# Patient Record
Sex: FEMALE | Race: WHITE | NOT HISPANIC OR LATINO | Employment: OTHER | URBAN - METROPOLITAN AREA
[De-identification: names, ages, dates, MRNs, and addresses within clinical notes are randomized per-mention and may not be internally consistent; named-entity substitution may affect disease eponyms.]

---

## 2017-01-09 ENCOUNTER — HOSPITAL ENCOUNTER (OUTPATIENT)
Dept: MAMMOGRAPHY | Facility: HOSPITAL | Age: 82
Discharge: HOME | End: 2017-01-09
Attending: INTERNAL MEDICINE | Admitting: INTERNAL MEDICINE

## 2017-01-09 DIAGNOSIS — Z13.9 SCREENING: ICD-10-CM

## 2017-01-09 PROCEDURE — G0202 SCR MAMMO BI INCL CAD: HCPCS

## 2017-01-18 ENCOUNTER — OFFICE VISIT (OUTPATIENT)
Dept: INTERNAL MEDICINE | Facility: CLINIC | Age: 82
End: 2017-01-18

## 2017-01-18 VITALS
HEART RATE: 90 BPM | RESPIRATION RATE: 16 BRPM | TEMPERATURE: 98 F | SYSTOLIC BLOOD PRESSURE: 120 MMHG | HEIGHT: 61 IN | OXYGEN SATURATION: 95 % | BODY MASS INDEX: 29.94 KG/M2 | WEIGHT: 158.6 LBS | DIASTOLIC BLOOD PRESSURE: 72 MMHG

## 2017-01-18 DIAGNOSIS — J06.9 VIRAL UPPER RESPIRATORY TRACT INFECTION: Primary | ICD-10-CM

## 2017-01-18 PROCEDURE — 99213 OFFICE O/P EST LOW 20 MIN: CPT | Performed by: INTERNAL MEDICINE

## 2017-01-18 RX ORDER — AZITHROMYCIN 250 MG/1
TABLET, FILM COATED ORAL
Qty: 6 TABLET | Refills: 0 | Status: SHIPPED | OUTPATIENT
Start: 2017-01-18 | End: 2018-01-02

## 2017-01-18 RX ORDER — MAGNESIUM OXIDE/MAG AA CHELATE 300 MG
1 CAPSULE ORAL DAILY
COMMUNITY

## 2017-01-18 NOTE — PROGRESS NOTES
Subjective   Gladys Izquierdo is a 82 y.o. female.     Chief Complaint   Patient presents with   • Sore Throat     coughing   • Nasal Congestion         Sore Throat    This is a new problem. The current episode started yesterday. The problem has been unchanged. Neither side of throat is experiencing more pain than the other. There has been no fever. The pain is mild. Associated symptoms include congestion, coughing, headaches and shortness of breath. Pertinent negatives include no ear pain, hoarse voice or trouble swallowing. She has tried nothing for the symptoms. The treatment provided no relief.        The following portions of the patient's history were reviewed and updated as appropriate: allergies, current medications, past social history and problem list.    Outpatient Prescriptions Marked as Taking for the 1/18/17 encounter (Office Visit) with Johan Honeycutt MD   Medication Sig Dispense Refill   • losartan (COZAAR) 100 MG tablet TAKE 1 TABLET BY MOUTH DAILY 90 tablet 3   • Magnesium 300 MG capsule Take  by mouth.     • nystatin (MYCOSTATIN) 614053 UNIT/GM powder Apply  topically daily. 1 each 3   • PROAIR  (90 BASE) MCG/ACT inhaler INHALE 2 PUFFS BY MOUTH EVERY 4 HOURS AS NEEDED FOR WHEEZING ' 8.5 g 2   • Probiotic Product (PROBIOTIC ADVANCED PO) Take 1 tablet by mouth Daily.         Review of Systems   Constitutional: Negative for chills, fatigue and fever.   HENT: Positive for congestion, postnasal drip, rhinorrhea, sneezing, sore throat and voice change. Negative for ear pain, hoarse voice, sinus pressure and trouble swallowing.    Respiratory: Positive for cough and shortness of breath. Negative for wheezing.    Neurological: Positive for headaches.       Objective   Vitals:    01/18/17 1118   BP: 120/72   Pulse: 90   Resp: 16   Temp: 98 °F (36.7 °C)   SpO2: 95%      Last Weight    01/18/17  1118   Weight: 158 lb 9.6 oz (71.9 kg)    [unfilled]  Body mass index is 29.97 kg/(m^2).      Physical Exam    Constitutional: She appears well-developed and well-nourished.   HENT:   Head: Normocephalic and atraumatic.   Right Ear: External ear normal.   Left Ear: External ear normal.   Nose: Nose normal.   Mouth/Throat: Oropharynx is clear and moist.   Eyes: Conjunctivae are normal. Pupils are equal, round, and reactive to light.   Pulmonary/Chest: Effort normal and breath sounds normal. No respiratory distress. She has no wheezes. She has no rales.   Skin: Skin is warm and dry.         Problem List Items Addressed This Visit     None      Visit Diagnoses     Viral upper respiratory tract infection    -  Primary        Assessment/Plan   In with 1 day of sore throat.  Head congestion.  Postnasal drip.  Coughing.  She's leaving the country in 2 days for a trip to [place].  She'll start taking some Coricidin which she has on hand.  We'll start on a Z-Thiago to keep on hand for her trip.  Sounds like a viral illness.         Cindy disclaimer:   Much of this encounter note is an electronic transcription/translation of spoken language to printed text. The electronic translation of spoken language may permit erroneous, or at times, nonsensical words or phrases to be inadvertently transcribed; Although I have reviewed the note for such errors, some may still exist.

## 2017-01-18 NOTE — MR AVS SNAPSHOT
Gladys Izquierdo   2017 10:50 AM   Office Visit    Provider:  Johan Honeycutt MD   Department:  [facility] INTERNAL MEDICINE   Dept Phone:  [phone]                Your Full Care Plan              Where to Get Your Medications      These medications were sent to [facility] - [place], [place] - [address], [address] - [phone] PH - [phone] FX  [address], [address], [facility] [address]     Phone:  [phone]     azithromycin 250 MG tablet            Your Updated Medication List          This list is accurate as of: 17  1:36 PM.  Always use your most recent med list.                azithromycin 250 MG tablet   Commonly known as:  ZITHROMAX   Take 2 tablets the first day, then 1 tablet daily for 4 days.       losartan 100 MG tablet   Commonly known as:  COZAAR   TAKE 1 TABLET BY MOUTH DAILY       Magnesium 300 MG capsule       nystatin 253763 UNIT/GM powder   Commonly known as:  MYCOSTATIN   Apply  topically daily.       PROAIR  (90 BASE) MCG/ACT inhaler   Generic drug:  albuterol   INHALE 2 PUFFS BY MOUTH EVERY 4 HOURS AS NEEDED FOR WHEEZING '       PROBIOTIC ADVANCED PO               You Were Diagnosed With        Codes Comments    Viral upper respiratory tract infection    -  Primary ICD-10-CM: J06.9, B97.89  ICD-9-CM: 465.9       Instructions     None    Patient Instructions History      [employer] Signup     [facility] [employer] allows you to send messages to your doctor, view your test results, renew your prescriptions, schedule appointments, and more. To sign up, go to [employer] and click on the Sign Up Now link in the New User? box. Enter your [employer] Activation Code exactly as it appears below along with the last four digits of your Social Security Number and your Date of Birth () to complete the sign-up process. If you do not sign up before the expiration date, you must request a new code.    [employer]  "Activation Code: [email or web address]-  Expires: 2/1/2017  1:36 PM    If you have questions, you can email [email or web address] or call [phone] to talk to our MyChart staff. Remember, MyChart is NOT to be used for urgent needs. For medical emergencies, dial 911.               Other Info from Your Visit           Allergies     Demerol [Meperidine]      B/P problems    Latex      blisters    Morphine And Related      Decreased b/p    Dilaudid [Hydromorphone Hcl]  Rash      Reason for Visit     Sore Throat coughing    Nasal Congestion           Vital Signs     Blood Pressure Pulse Temperature Respirations Height Weight    120/72 (BP Location: Left arm, Patient Position: Sitting, Cuff Size: Large Adult) 90 98 °F (36.7 °C) (Oral) 16 61\" (154.9 cm) 158 lb 9.6 oz (71.9 kg)    Oxygen Saturation Body Mass Index Smoking Status             95% 29.97 kg/m2 Former Smoker         Problems and Diagnoses Noted     Viral upper respiratory tract infection    -  Primary      "

## 2017-10-23 ENCOUNTER — OFFICE VISIT (OUTPATIENT)
Dept: INTERNAL MEDICINE | Facility: CLINIC | Age: 82
End: 2017-10-23

## 2017-10-23 VITALS
OXYGEN SATURATION: 92 % | DIASTOLIC BLOOD PRESSURE: 70 MMHG | HEIGHT: 61 IN | TEMPERATURE: 98 F | WEIGHT: 144.6 LBS | BODY MASS INDEX: 27.3 KG/M2 | SYSTOLIC BLOOD PRESSURE: 144 MMHG | HEART RATE: 81 BPM | RESPIRATION RATE: 16 BRPM

## 2017-10-23 DIAGNOSIS — M51.35 DDD (DEGENERATIVE DISC DISEASE), THORACOLUMBAR: ICD-10-CM

## 2017-10-23 DIAGNOSIS — I10 ESSENTIAL HYPERTENSION: ICD-10-CM

## 2017-10-23 DIAGNOSIS — Z00.00 ENCOUNTER FOR ANNUAL HEALTH EXAMINATION: Primary | ICD-10-CM

## 2017-10-23 LAB
ALBUMIN SERPL-MCNC: 4.5 G/DL (ref 3.5–5.2)
ALBUMIN/GLOB SERPL: 1.8 G/DL
ALP SERPL-CCNC: 78 U/L (ref 39–117)
ALT SERPL-CCNC: 15 U/L (ref 1–33)
AST SERPL-CCNC: 21 U/L (ref 1–32)
BASOPHILS # BLD AUTO: 0.04 10*3/MM3 (ref 0–0.2)
BASOPHILS NFR BLD AUTO: 0.6 % (ref 0–1.5)
BILIRUB BLD-MCNC: ABNORMAL MG/DL
BILIRUB SERPL-MCNC: 0.6 MG/DL (ref 0.1–1.2)
BUN SERPL-MCNC: 25 MG/DL (ref 8–23)
BUN/CREAT SERPL: 26.6 (ref 7–25)
CALCIUM SERPL-MCNC: 10 MG/DL (ref 8.6–10.5)
CHLORIDE SERPL-SCNC: 104 MMOL/L (ref 98–107)
CHOLEST SERPL-MCNC: 192 MG/DL (ref 0–200)
CLARITY, POC: CLEAR
CO2 SERPL-SCNC: 25.6 MMOL/L (ref 22–29)
COLOR UR: YELLOW
CREAT SERPL-MCNC: 0.94 MG/DL (ref 0.57–1)
EOSINOPHIL # BLD AUTO: 0.21 10*3/MM3 (ref 0–0.7)
EOSINOPHIL NFR BLD AUTO: 3.3 % (ref 0.3–6.2)
ERYTHROCYTE [DISTWIDTH] IN BLOOD BY AUTOMATED COUNT: 13.6 % (ref 11.7–13)
GFR SERPLBLD CREATININE-BSD FMLA CKD-EPI: 57 ML/MIN/1.73
GFR SERPLBLD CREATININE-BSD FMLA CKD-EPI: 69 ML/MIN/1.73
GLOBULIN SER CALC-MCNC: 2.5 GM/DL
GLUCOSE SERPL-MCNC: 90 MG/DL (ref 65–99)
GLUCOSE UR STRIP-MCNC: NEGATIVE MG/DL
HCT VFR BLD AUTO: 38.4 % (ref 35.6–45.5)
HDLC SERPL-MCNC: 87 MG/DL (ref 40–60)
HGB BLD-MCNC: 12.2 G/DL (ref 11.9–15.5)
IMM GRANULOCYTES # BLD: 0 10*3/MM3 (ref 0–0.03)
IMM GRANULOCYTES NFR BLD: 0 % (ref 0–0.5)
KETONES UR QL: ABNORMAL
LDLC SERPL CALC-MCNC: 89 MG/DL (ref 0–100)
LEUKOCYTE EST, POC: NEGATIVE
LYMPHOCYTES # BLD AUTO: 1.9 10*3/MM3 (ref 0.9–4.8)
LYMPHOCYTES NFR BLD AUTO: 29.9 % (ref 19.6–45.3)
MCH RBC QN AUTO: 32.1 PG (ref 26.9–32)
MCHC RBC AUTO-ENTMCNC: 31.8 G/DL (ref 32.4–36.3)
MCV RBC AUTO: 101.1 FL (ref 80.5–98.2)
MONOCYTES # BLD AUTO: 0.66 10*3/MM3 (ref 0.2–1.2)
MONOCYTES NFR BLD AUTO: 10.4 % (ref 5–12)
NEUTROPHILS # BLD AUTO: 3.55 10*3/MM3 (ref 1.9–8.1)
NEUTROPHILS NFR BLD AUTO: 55.8 % (ref 42.7–76)
NITRITE UR-MCNC: NEGATIVE MG/ML
PH UR: 6 [PH] (ref 5–8)
PLATELET # BLD AUTO: 299 10*3/MM3 (ref 140–500)
POTASSIUM SERPL-SCNC: 4.6 MMOL/L (ref 3.5–5.2)
PROT SERPL-MCNC: 7 G/DL (ref 6–8.5)
PROT UR STRIP-MCNC: ABNORMAL MG/DL
RBC # BLD AUTO: 3.8 10*6/MM3 (ref 3.9–5.2)
RBC # UR STRIP: ABNORMAL /UL
SODIUM SERPL-SCNC: 144 MMOL/L (ref 136–145)
SP GR UR: 1.03 (ref 1–1.03)
TRIGL SERPL-MCNC: 81 MG/DL (ref 0–150)
UROBILINOGEN UR QL: NORMAL
VLDLC SERPL CALC-MCNC: 16.2 MG/DL (ref 5–40)
WBC # BLD AUTO: 6.36 10*3/MM3 (ref 4.5–10.7)

## 2017-10-23 PROCEDURE — 81003 URINALYSIS AUTO W/O SCOPE: CPT | Performed by: INTERNAL MEDICINE

## 2017-10-23 PROCEDURE — 99397 PER PM REEVAL EST PAT 65+ YR: CPT | Performed by: INTERNAL MEDICINE

## 2017-10-23 NOTE — PROGRESS NOTES
Subjective   Gladys Izquierdo is a 82 y.o. female.     Chief Complaint   Patient presents with   • Annual Exam         HPI Comments: In for annual preventative exam.  Sleep is good.  Gets 7-8 hours at night.  Exercises daily chasing her 3 grandchildren.  Nothing at the gym.  Energy is good.  Diet is well-balanced.       The following portions of the patient's history were reviewed and updated as appropriate: allergies, current medications, past social history and problem list.    HISTORY  No outpatient prescriptions have been marked as taking for the 10/23/17 encounter (Office Visit) with Johan Honeycutt MD.     Social History     Social History   • Marital status:      Spouse name: N/A   • Number of children: N/A   • Years of education: N/A     Occupational History   • Not on file.     Social History Main Topics   • Smoking status: Former Smoker     Years: 28.00     Quit date: 1995   • Smokeless tobacco: Not on file      Comment: less than 1 pack per day for 28 years    • Alcohol use No   • Drug use: Not on file   • Sexual activity: Not on file     Other Topics Concern   • Not on file     Social History Narrative     Family History   Problem Relation Age of Onset   • No Known Problems Brother    • No Known Problems Daughter    • No Known Problems Son    • No Known Problems Daughter      No past medical history on file.  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   HENT: Negative for congestion, ear pain, hearing loss, nosebleeds, postnasal drip, rhinorrhea, sinus pressure and trouble swallowing.    Eyes: Negative for pain, itching and visual disturbance.   Respiratory:  Positive for wheezing. Negative for cough, chest tightness and shortness of breath.    Cardiovascular: Negative for chest pain, palpitations and leg swelling.   Gastrointestinal: Negative for abdominal pain, anal bleeding, constipation, diarrhea, nausea, rectal pain and vomiting.   Endocrine: Negative for cold intolerance, heat intolerance and polyuria.   Genitourinary: Negative for difficulty urinating, dysuria, flank pain, frequency, hematuria and urgency.   Musculoskeletal: Positive for arthralgias (hands) and back pain. Negative for myalgias.   Skin: Negative for rash.   Allergic/Immunologic: Negative for environmental allergies.   Neurological: Negative for dizziness, syncope, speech difficulty, weakness, light-headedness, numbness and headaches.   Hematological: Bruises/bleeds easily.   Psychiatric/Behavioral: Negative for agitation, confusion and sleep disturbance. The patient is not nervous/anxious.        Objective   Vitals:    10/23/17 1422   BP: 144/70   Pulse: 81   Resp: 16   Temp: 98 °F (36.7 °C)   SpO2: 92%      Last Weight    10/23/17  1422   Weight: 144 lb 9.6 oz (65.6 kg)    [unfilled]  Body mass index is 27.32 kg/(m^2).      Physical Exam   Constitutional: She is oriented to person, place, and time. She appears well-developed and well-nourished.   HENT:   Head: Normocephalic and atraumatic.   Right Ear: External ear normal.   Left Ear: External ear normal.   Nose: Nose normal.   Mouth/Throat: Oropharynx is clear and moist.   Eyes: Conjunctivae and EOM are normal. Pupils are equal, round, and reactive to light.   Neck: Normal range of motion. Neck supple. No JVD present. No thyromegaly present.   Cardiovascular: Normal rate, regular rhythm, normal heart sounds and intact distal pulses.  Exam reveals no gallop.    No murmur heard.  Pulmonary/Chest: Effort normal and breath sounds normal. No respiratory distress. She has no wheezes. She has no rales. Right breast exhibits no inverted nipple, no  mass and no tenderness. Left breast exhibits no inverted nipple, no mass and no tenderness.   Abdominal: Soft. Bowel sounds are normal. She exhibits no distension and no mass. There is no tenderness. There is no guarding. No hernia.   Musculoskeletal: Normal range of motion. She exhibits no edema.   Lymphadenopathy:     She has no cervical adenopathy.   Neurological: She is alert and oriented to person, place, and time. She displays normal reflexes. No cranial nerve deficit. Coordination normal.   Skin: Skin is warm and dry.   Psychiatric: She has a normal mood and affect. Her behavior is normal. Judgment and thought content normal.   Nursing note and vitals reviewed.        Problem List Items Addressed This Visit        Cardiovascular and Mediastinum    Hypertension       Musculoskeletal and Integument    DDD (degenerative disc disease), thoracolumbar      Other Visit Diagnoses     Encounter for annual health examination    -  Primary    Relevant Orders    CBC & Differential    Comprehensive Metabolic Panel    Lipid Panel    POCT urinalysis dipstick, automated (Completed)        Assessment/Plan   In for annual preventive exam.  Blood pressure is excellent.  Asthma is pretty minimal.  She's had chronic mid to low back pain and what sounds like some lumbar spinal stenosis with neurogenic claudication.  She is going to use acupuncture when necessary now.  Annual lab work today including CBC CMP and lipids urinalysis and EKG.  Has refused colonoscopy in the past due to prior perforation.  Annual wellness exam today.  Follow-up one year with the same.         Dragon disclaimer:   Much of this encounter note is an electronic transcription/translation of spoken language to printed text. The electronic translation of spoken language may permit erroneous, or at times, nonsensical words or phrases to be inadvertently transcribed; Although I have reviewed the note for such errors, some may still exist.

## 2017-11-07 RX ORDER — LOSARTAN POTASSIUM 100 MG/1
TABLET ORAL
Qty: 90 TABLET | Refills: 3 | Status: SHIPPED | OUTPATIENT
Start: 2017-11-07 | End: 2018-11-21 | Stop reason: SDUPTHER

## 2017-11-22 ENCOUNTER — TELEPHONE (OUTPATIENT)
Dept: INTERNAL MEDICINE | Facility: CLINIC | Age: 82
End: 2017-11-22

## 2017-11-22 RX ORDER — AMOXICILLIN AND CLAVULANATE POTASSIUM 875; 125 MG/1; MG/1
1 TABLET, FILM COATED ORAL 2 TIMES DAILY
Qty: 20 TABLET | Refills: 0 | Status: SHIPPED | OUTPATIENT
Start: 2017-11-22 | End: 2018-01-02

## 2017-11-22 NOTE — TELEPHONE ENCOUNTER
The patient called saying that she has been sick for several days. She has a bad cough that keeps her awake at night and has been having body aches and chills, but no fever or congestion or drainage. She said when she is sick like this, it usually turns into bronchitis and she did not want to go through the long weekend without something. She asked if you would prescribe her an antibiotic. Please advise. Thanks!    Begin Augmentin 875 mg.  1 twice a day.  #20.

## 2017-12-08 ENCOUNTER — TRANSCRIBE ORDERS (OUTPATIENT)
Dept: ADMINISTRATIVE | Facility: HOSPITAL | Age: 82
End: 2017-12-08

## 2017-12-08 DIAGNOSIS — Z12.31 VISIT FOR SCREENING MAMMOGRAM: Primary | ICD-10-CM

## 2018-01-02 ENCOUNTER — OFFICE VISIT (OUTPATIENT)
Dept: INTERNAL MEDICINE | Facility: CLINIC | Age: 83
End: 2018-01-02

## 2018-01-02 VITALS
DIASTOLIC BLOOD PRESSURE: 76 MMHG | BODY MASS INDEX: 27.72 KG/M2 | OXYGEN SATURATION: 96 % | SYSTOLIC BLOOD PRESSURE: 140 MMHG | RESPIRATION RATE: 16 BRPM | HEIGHT: 61 IN | WEIGHT: 146.8 LBS | HEART RATE: 98 BPM | TEMPERATURE: 98 F

## 2018-01-02 DIAGNOSIS — Z23 NEED FOR VACCINATION: ICD-10-CM

## 2018-01-02 DIAGNOSIS — J40 BRONCHITIS: Primary | ICD-10-CM

## 2018-01-02 PROCEDURE — 99213 OFFICE O/P EST LOW 20 MIN: CPT | Performed by: INTERNAL MEDICINE

## 2018-01-02 PROCEDURE — G0439 PPPS, SUBSEQ VISIT: HCPCS | Performed by: INTERNAL MEDICINE

## 2018-01-02 PROCEDURE — 90715 TDAP VACCINE 7 YRS/> IM: CPT | Performed by: INTERNAL MEDICINE

## 2018-01-02 PROCEDURE — 90471 IMMUNIZATION ADMIN: CPT | Performed by: INTERNAL MEDICINE

## 2018-01-02 RX ORDER — AMOXICILLIN AND CLAVULANATE POTASSIUM 875; 125 MG/1; MG/1
1 TABLET, FILM COATED ORAL EVERY 12 HOURS SCHEDULED
Qty: 20 TABLET | Refills: 0 | Status: SHIPPED | OUTPATIENT
Start: 2018-01-02 | End: 2018-02-12

## 2018-01-02 RX ORDER — MULTIVITAMIN/IRON/FOLIC ACID 18MG-0.4MG
1 TABLET ORAL DAILY
COMMUNITY

## 2018-01-02 RX ORDER — CHLORAL HYDRATE 500 MG
1000 CAPSULE ORAL
COMMUNITY

## 2018-01-02 NOTE — PATIENT INSTRUCTIONS
Steps to Quit Smoking   Smoking tobacco can be harmful to your health and can affect almost every organ in your body. Smoking puts you, and those around you, at risk for developing many serious chronic diseases. Quitting smoking is difficult, but it is one of the best things that you can do for your health. It is never too late to quit.  WHAT ARE THE BENEFITS OF QUITTING SMOKING?  When you quit smoking, you lower your risk of developing serious diseases and conditions, such as:  · Lung cancer or lung disease, such as COPD.  · Heart disease.  · Stroke.  · Heart attack.  · Infertility.  · Osteoporosis and bone fractures.  Additionally, symptoms such as coughing, wheezing, and shortness of breath may get better when you quit. You may also find that you get sick less often because your body is stronger at fighting off colds and infections. If you are pregnant, quitting smoking can help to reduce your chances of having a baby of low birth weight.  HOW DO I GET READY TO QUIT?  When you decide to quit smoking, create a plan to make sure that you are successful. Before you quit:  · Pick a date to quit. Set a date within the next two weeks to give you time to prepare.  · Write down the reasons why you are quitting. Keep this list in places where you will see it often, such as on your bathroom mirror or in your car or wallet.  · Identify the people, places, things, and activities that make you want to smoke (triggers) and avoid them. Make sure to take these actions:    Throw away all cigarettes at home, at work, and in your car.    Throw away smoking accessories, such as ashtrays and lighters.    Clean your car and make sure to empty the ashtray.    Clean your home, including curtains and carpets.  · Tell your family, friends, and coworkers that you are quitting. Support from your loved ones can make quitting easier.  · Talk with your health care provider about your options for quitting smoking.  · Find out what treatment  "options are covered by your health insurance.  WHAT STRATEGIES CAN I USE TO QUIT SMOKING?   Talk with your healthcare provider about different strategies to quit smoking. Some strategies include:  · Quitting smoking altogether instead of gradually lessening how much you smoke over a period of time. Research shows that quitting \"cold turkey\" is more successful than gradually quitting.  · Attending in-person counseling to help you build problem-solving skills. You are more likely to have success in quitting if you attend several counseling sessions. Even short sessions of 10 minutes can be effective.  · Finding resources and support systems that can help you to quit smoking and remain smoke-free after you quit. These resources are most helpful when you use them often. They can include:    Online chats with a counselor.    Telephone quitlines.    Printed self-help materials.    Support groups or group counseling.    Text messaging programs.    Mobile phone applications.  · Taking medicines to help you quit smoking. (If you are pregnant or breastfeeding, talk with your health care provider first.) Some medicines contain nicotine and some do not. Both types of medicines help with cravings, but the medicines that include nicotine help to relieve withdrawal symptoms. Your health care provider may recommend:    Nicotine patches, gum, or lozenges.    Nicotine inhalers or sprays.    Non-nicotine medicine that is taken by mouth.  Talk with your health care provider about combining strategies, such as taking medicines while you are also receiving in-person counseling. Using these two strategies together makes you more likely to succeed in quitting than if you used either strategy on its own.  If you are pregnant or breastfeeding, talk with your health care provider about finding counseling or other support strategies to quit smoking. Do not take medicine to help you quit smoking unless told to do so by your health care " provider.  WHAT THINGS CAN I DO TO MAKE IT EASIER TO QUIT?  Quitting smoking might feel overwhelming at first, but there is a lot that you can do to make it easier. Take these important actions:  · Reach out to your family and friends and ask that they support and encourage you during this time. Call telephone quitlines, reach out to support groups, or work with a counselor for support.  · Ask people who smoke to avoid smoking around you.  · Avoid places that trigger you to smoke, such as bars, parties, or smoke-break areas at work.  · Spend time around people who do not smoke.  · Lessen stress in your life, because stress can be a smoking trigger for some people. To lessen stress, try:    Exercising regularly.    Deep-breathing exercises.    Yoga.    Meditating.    Performing a body scan. This involves closing your eyes, scanning your body from head to toe, and noticing which parts of your body are particularly tense. Purposefully relax the muscles in those areas.  · Download or purchase mobile phone or tablet apps (applications) that can help you stick to your quit plan by providing reminders, tips, and encouragement. There are many free apps, such as QuitGuide from the CDC (Centers for Disease Control and Prevention). You can find other support for quitting smoking (smoking cessation) through smokefree.gov and other websites.  HOW WILL I FEEL WHEN I QUIT SMOKING?  Within the first 24 hours of quitting smoking, you may start to feel some withdrawal symptoms. These symptoms are usually most noticeable 2-3 days after quitting, but they usually do not last beyond 2-3 weeks. Changes or symptoms that you might experience include:  · Mood swings.  · Restlessness, anxiety, or irritation.  · Difficulty concentrating.  · Dizziness.  · Strong cravings for sugary foods in addition to nicotine.  · Mild weight gain.  · Constipation.  · Nausea.  · Coughing or a sore throat.  · Changes in how your medicines work in your  body.  · A depressed mood.  · Difficulty sleeping (insomnia).  After the first 2-3 weeks of quitting, you may start to notice more positive results, such as:  · Improved sense of smell and taste.  · Decreased coughing and sore throat.  · Slower heart rate.  · Lower blood pressure.  · Clearer skin.  · The ability to breathe more easily.  · Fewer sick days.  Quitting smoking is very challenging for most people. Do not get discouraged if you are not successful the first time. Some people need to make many attempts to quit before they achieve long-term success. Do your best to stick to your quit plan, and talk with your health care provider if you have any questions or concerns.     This information is not intended to replace advice given to you by your health care provider. Make sure you discuss any questions you have with your health care provider.     Document Released: 12/12/2002 Document Revised: 05/03/2016 Document Reviewed: 05/03/2016  [employer] Interactive Patient Education ©2017 [employer]  Fall Prevention in the Home   Falls can cause injuries and can affect people from all age groups. There are many simple things that you can do to make your home safe and to help prevent falls.  WHAT CAN I DO ON THE OUTSIDE OF MY HOME?  · Regularly repair the edges of walkways and driveways and fix any cracks.  · Remove high doorway thresholds.  · Trim any shrubbery on the main path into your home.  · Use bright outdoor lighting.  · Clear walkways of debris and clutter, including tools and rocks.  · Regularly check that handrails are securely fastened and in good repair. Both sides of any steps should have handrails.  · Install guardrails along the edges of any raised decks or porches.  · Have leaves, snow, and ice cleared regularly.  · Use sand or salt on walkways during winter months.  · In the garage, clean up any spills right away, including grease or oil spills.  WHAT CAN I DO IN THE BATHROOM?  · Use night  lights.  · Install grab bars by the toilet and in the tub and shower. Do not use towel bars as grab bars.  · Use non-skid mats or decals on the floor of the tub or shower.  · If you need to sit down while you are in the shower, use a plastic, non-slip stool.  · Keep the floor dry. Immediately clean up any water that spills on the floor.  · Remove soap buildup in the tub or shower on a regular basis.  · Attach bath mats securely with double-sided non-slip rug tape.  · Remove throw rugs and other tripping hazards from the floor.  WHAT CAN I DO IN THE BEDROOM?  · Use night lights.  · Make sure that a bedside light is easy to reach.  · Do not use oversized bedding that drapes onto the floor.  · Have a firm chair that has side arms to use for getting dressed.  · Remove throw rugs and other tripping hazards from the floor.  WHAT CAN I DO IN THE KITCHEN?   · Clean up any spills right away.  · Avoid walking on wet floors.  · Place frequently used items in easy-to-reach places.  · If you need to reach for something above you, use a sturdy step stool that has a grab bar.  · Keep electrical cables out of the way.  · Do not use floor polish or wax that makes floors slippery. If you have to use wax, make sure that it is non-skid floor wax.  · Remove throw rugs and other tripping hazards from the floor.  WHAT CAN I DO IN THE STAIRWAYS?  · Do not leave any items on the stairs.  · Make sure that there are handrails on both sides of the stairs. Fix handrails that are broken or loose. Make sure that handrails are as long as the stairways.  · Check any carpeting to make sure that it is firmly attached to the stairs. Fix any carpet that is loose or worn.  · Avoid having throw rugs at the top or bottom of stairways, or secure the rugs with carpet tape to prevent them from moving.  · Make sure that you have a light switch at the top of the stairs and the bottom of the stairs. If you do not have them, have them installed.  WHAT ARE SOME  OTHER FALL PREVENTION TIPS?  · Wear closed-toe shoes that fit well and support your feet. Wear shoes that have rubber soles or low heels.  · When you use a stepladder, make sure that it is completely opened and that the sides are firmly locked. Have someone hold the ladder while you are using it. Do not climb a closed stepladder.  · Add color or contrast paint or tape to grab bars and handrails in your home. Place contrasting color strips on the first and last steps.  · Use mobility aids as needed, such as canes, walkers, scooters, and crutches.  · Turn on lights if it is dark. Replace any light bulbs that burn out.  · Set up furniture so that there are clear paths. Keep the furniture in the same spot.  · Fix any uneven floor surfaces.  · Choose a carpet design that does not hide the edge of steps of a stairway.  · Be aware of any and all pets.  · Review your medicines with your healthcare provider. Some medicines can cause dizziness or changes in blood pressure, which increase your risk of falling.  Talk with your health care provider about other ways that you can decrease your risk of falls. This may include working with a physical therapist or  to improve your strength, balance, and endurance.     This information is not intended to replace advice given to you by your health care provider. Make sure you discuss any questions you have with your health care provider.     Document Released: 12/08/2003 Document Revised: 04/10/2017 Document Reviewed: 01/22/2016  [employer] Interactive Patient Education ©2017 [employer]  Fat and Cholesterol Restricted Diet  High levels of fat and cholesterol in your blood may lead to various health problems, such as diseases of the heart, blood vessels, gallbladder, liver, and pancreas. Fats are concentrated sources of energy that come in various forms. Certain types of fat, including saturated fat, may be harmful in excess. Cholesterol is a substance needed by your body in  small amounts. Your body makes all the cholesterol it needs. Excess cholesterol comes from the food you eat.  When you have high levels of cholesterol and saturated fat in your blood, health problems can develop because the excess fat and cholesterol will gather along the walls of your blood vessels, causing them to narrow. Choosing the right foods will help you control your intake of fat and cholesterol. This will help keep the levels of these substances in your blood within normal limits and reduce your risk of disease.  WHAT IS MY PLAN?  Your health care provider recommends that you:  · Get no more than __________ % of the total calories in your daily diet from fat.  · Limit your intake of saturated fat to less than ______% of your total calories each day.  · Limit the amount of cholesterol in your diet to less than _________mg per day.  WHAT TYPES OF FAT SHOULD I CHOOSE?  · Choose healthy fats more often. Choose monounsaturated and polyunsaturated fats, such as olive and canola oil, flaxseeds, walnuts, almonds, and seeds.  · Eat more omega-3 fats. Good choices include salmon, mackerel, sardines, tuna, flaxseed oil, and ground flaxseeds. Aim to eat fish at least two times a week.  · Limit saturated fats. Saturated fats are primarily found in animal products, such as meats, butter, and cream. Plant sources of saturated fats include palm oil, palm kernel oil, and coconut oil.  · Avoid foods with partially hydrogenated oils in them. These contain trans fats. Examples of foods that contain trans fats are stick margarine, some tub margarines, cookies, crackers, and other baked goods.  WHAT GENERAL GUIDELINES DO I NEED TO FOLLOW?  These guidelines for healthy eating will help you control your intake of fat and cholesterol:  · Check food labels carefully to identify foods with trans fats or high amounts of saturated fat.  · Fill one half of your plate with vegetables and green salads.  · Fill one fourth of your plate  "with whole grains. Look for the word \"whole\" as the first word in the ingredient list.  · Fill one fourth of your plate with lean protein foods.  · Limit fruit to two servings a day. Choose fruit instead of juice.  · Eat more foods that contain soluble fiber. Examples of foods that contain this type of fiber are apples, broccoli, carrots, beans, peas, and barley. Aim to get 20-30 g of fiber per day.  · Eat more home-cooked food and less restaurant, buffet, and fast food.  · Limit or avoid alcohol.  · Limit foods high in starch and sugar.  · Limit fried foods.  · Cook foods using methods other than frying. Baking, boiling, grilling, and broiling are all great options.  · Lose weight if you are overweight. Losing just 5-10% of your initial body weight can help your overall health and prevent diseases such as diabetes and heart disease.  WHAT FOODS CAN I EAT?  Grains  Whole grains, such as whole wheat or whole grain breads, crackers, cereals, and pasta. Unsweetened oatmeal, bulgur, barley, quinoa, or brown rice. Corn or whole wheat flour tortillas.  Vegetables  Fresh or frozen vegetables (raw, steamed, roasted, or grilled). Green salads.  Fruits  All fresh, canned (in natural juice), or frozen fruits.  Meat and Other Protein Products  Ground beef (85% or leaner), grass-fed beef, or beef trimmed of fat. Skinless chicken or turkey. Ground chicken or turkey. Pork trimmed of fat. All fish and seafood. Eggs. Dried beans, peas, or lentils. Unsalted nuts or seeds. Unsalted canned or dry beans.  Dairy  Low-fat dairy products, such as skim or 1% milk, 2% or reduced-fat cheeses, low-fat ricotta or cottage cheese, or plain low-fat yogurt.  Fats and Oils  Tub margarines without trans fats. Light or reduced-fat mayonnaise and salad dressings. Avocado. Olive, canola, sesame, or safflower oils. Natural peanut or almond butter (choose ones without added sugar and oil).  The items listed above may not be a complete list of recommended " foods or beverages. Contact your dietitian for more options.  WHAT FOODS ARE NOT RECOMMENDED?  Grains  White bread. White pasta. White rice. Cornbread. Bagels, pastries, and croissants. Crackers that contain trans fat.  Vegetables  White potatoes. Corn. Creamed or fried vegetables. Vegetables in a cheese sauce.  Fruits  Dried fruits. Canned fruit in light or heavy syrup. Fruit juice.  Meat and Other Protein Products  Fatty cuts of meat. Ribs, chicken wings, parish, sausage, bologna, salami, chitterlings, fatback, hot dogs, bratwurst, and packaged luncheon meats. Liver and organ meats.  Dairy  Whole or 2% milk, cream, half-and-half, and cream cheese. Whole milk cheeses. Whole-fat or sweetened yogurt. Full-fat cheeses. Nondairy creamers and whipped toppings. Processed cheese, cheese spreads, or cheese curds.  Sweets and Desserts  Corn syrup, sugars, honey, and molasses. Candy. Jam and jelly. Syrup. Sweetened cereals. Cookies, pies, cakes, donuts, muffins, and ice cream.  Fats and Oils  Butter, stick margarine, lard, shortening, ghee, or parish fat. Coconut, palm kernel, or palm oils.  Beverages  Alcohol. Sweetened drinks (such as sodas, lemonade, and fruit drinks or punches).  The items listed above may not be a complete list of foods and beverages to avoid. Contact your dietitian for more information.     This information is not intended to replace advice given to you by your health care provider. Make sure you discuss any questions you have with your health care provider.     Document Released: 12/18/2006 Document Revised: 01/08/2016 Document Reviewed: 03/18/2015  [employer] Interactive Patient Education ©2017 [employer]  Exercising to Stay Healthy  Exercising regularly is important. It has many health benefits, such as:  · Improving your overall fitness, flexibility, and endurance.  · Increasing your bone density.  · Helping with weight control.  · Decreasing your body fat.  · Increasing your muscle  strength.  · Reducing stress and tension.  · Improving your overall health.  In order to become healthy and stay healthy, it is recommended that you do moderate-intensity and vigorous-intensity exercise. You can tell that you are exercising at a moderate intensity if you have a higher heart rate and faster breathing, but you are still able to hold a conversation. You can tell that you are exercising at a vigorous intensity if you are breathing much harder and faster and cannot hold a conversation while exercising.  HOW OFTEN SHOULD I EXERCISE?  Choose an activity that you enjoy and set realistic goals. Your health care provider can help you to make an activity plan that works for you. Exercise regularly as directed by your health care provider. This may include:   · Doing resistance training twice each week, such as:    Push-ups.    Sit-ups.    Lifting weights.    Using resistance bands.  · Doing a given intensity of exercise for a given amount of time. Choose from these options:    150 minutes of moderate-intensity exercise every week.    75 minutes of vigorous-intensity exercise every week.    A mix of moderate-intensity and vigorous-intensity exercise every week.  Children, pregnant women, people who are out of shape, people who are overweight, and older adults may need to consult a health care provider for individual recommendations. If you have any sort of medical condition, be sure to consult your health care provider before starting a new exercise program.   WHAT ARE SOME EXERCISE IDEAS?  Some moderate-intensity exercise ideas include:   · Walking at a rate of 1 mile in 15 minutes.  · Biking.  · Hiking.  · Golfing.  · Dancing.  Some vigorous-intensity exercise ideas include:   · Walking at a rate of at least 4.5 miles per hour.  · Jogging or running at a rate of 5 miles per hour.  · Biking at a rate of at least 10 miles per hour.  · Lap swimming.  · Roller-skating or in-line skating.  · Cross-country  skiing.  · Vigorous competitive sports, such as football, basketball, and soccer.  · Jumping rope.  · Aerobic dancing.  WHAT ARE SOME EVERYDAY ACTIVITIES THAT CAN HELP ME TO GET EXERCISE?  · Yard work, such as:    Pushing a .    Raking and bagging leaves.  · Washing and waxing your car.  · Pushing a stroller.  · Shoveling snow.  · Gardening.  · Washing windows or floors.  HOW CAN I BE MORE ACTIVE IN MY DAY-TO-DAY ACTIVITIES?  · Use the stairs instead of the elevator.  · Take a walk during your lunch break.  · If you drive, park your car farther away from work or school.  · If you take public transportation, get off one stop early and walk the rest of the way.  · Make all of your phone calls while standing up and walking around.  · Get up, stretch, and walk around every 30 minutes throughout the day.  WHAT GUIDELINES SHOULD I FOLLOW WHILE EXERCISING?  · Do not exercise so much that you hurt yourself, feel dizzy, or get very short of breath.  · Consult your health care provider before starting a new exercise program.  · Wear comfortable clothes and shoes with good support.  · Drink plenty of water while you exercise to prevent dehydration or heat stroke. Body water is lost during exercise and must be replaced.  · Work out until you breathe faster and your heart beats faster.     This information is not intended to replace advice given to you by your health care provider. Make sure you discuss any questions you have with your health care provider.     Document Released: 01/20/2012 Document Revised: 01/08/2016 Document Reviewed: 05/21/2015  [employer] Interactive Patient Education ©2017 [employer]  Chronic Pain  Chronic pain can be defined as pain that is off and on and lasts for 3-6 months or longer. Many things cause chronic pain, which can make it difficult to make a diagnosis. There are many treatment options available for chronic pain. However, finding a treatment that works well for you may require  trying various approaches until the right one is found. Many people benefit from a combination of two or more types of treatment to control their pain.  SYMPTOMS   Chronic pain can occur anywhere in the body and can range from mild to very severe. Some types of chronic pain include:  · Headache.  · Low back pain.  · Cancer pain.  · Arthritis pain.  · Neurogenic pain. This is pain resulting from damage to nerves.   People with chronic pain may also have other symptoms such as:  · Depression.  · Anger.  · Insomnia.  · Anxiety.  DIAGNOSIS   Your health care provider will help diagnose your condition over time. In many cases, the initial focus will be on excluding possible conditions that could be causing the pain. Depending on your symptoms, your health care provider may order tests to diagnose your condition. Some of these tests may include:   · Blood tests.    · CT scan.    · MRI.    · X-rays.    · Ultrasounds.    · Nerve conduction studies.    You may need to see a specialist.   TREATMENT   Finding treatment that works well may take time. You may be referred to a pain specialist. He or she may prescribe medicine or therapies, such as:   · Mindful meditation or yoga.  · Shots (injections) of numbing or pain-relieving medicines into the spine or area of pain.  · Local electrical stimulation.  · Acupuncture.    · Massage therapy.    · Aroma, color, light, or sound therapy.    · Biofeedback.    · Working with a physical therapist to keep from getting stiff.    · Regular, gentle exercise.    · Cognitive or behavioral therapy.    · Group support.    Sometimes, surgery may be recommended.   HOME CARE INSTRUCTIONS   · Take all medicines as directed by your health care provider.    · Lessen stress in your life by relaxing and doing things such as listening to calming music.    · Exercise or be active as directed by your health care provider.    · Eat a healthy diet and include things such as vegetables, fruits, fish, and  lean meats in your diet.    · Keep all follow-up appointments with your health care provider.    · Attend a support group with others suffering from chronic pain.  SEEK MEDICAL CARE IF:   · Your pain gets worse.    · You develop a new pain that was not there before.    · You cannot tolerate medicines given to you by your health care provider.    · You have new symptoms since your last visit with your health care provider.    SEEK IMMEDIATE MEDICAL CARE IF:   · You feel weak.    · You have decreased sensation or numbness.    · You lose control of bowel or bladder function.    · Your pain suddenly gets much worse.    · You develop shaking.  · You develop chills.  · You develop confusion.  · You develop chest pain.  · You develop shortness of breath.    MAKE SURE YOU:  · Understand these instructions.  · Will watch your condition.  · Will get help right away if you are not doing well or get worse.     This information is not intended to replace advice given to you by your health care provider. Make sure you discuss any questions you have with your health care provider.     Document Released: 09/09/2003 Document Revised: 08/20/2014 Document Reviewed: 06/13/2014  [employer] Interactive Patient Education ©2017 [employer]  BMI for Adults  Body mass index (BMI) is a number that is calculated from a person's weight and height. In most adults, the number is used to find how much of an adult's weight is made up of fat. BMI is not as accurate as a direct measure of body fat.  HOW IS BMI CALCULATED?  BMI is calculated by dividing weight in kilograms by height in meters squared. It can also be calculated by dividing weight in pounds by height in inches squared, then multiplying the resulting number by 703. Charts are available to help you find your BMI quickly and easily without doing this calculation.   HOW IS BMI INTERPRETED?  Health care professionals use BMI charts to identify whether an adult is underweight, at a normal  weight, or overweight based on the following guidelines:  · Underweight: BMI less than 18.5.  · Normal weight: BMI between 18.5 and 24.9.  · Overweight: BMI between 25 and 29.9.  · Obese: BMI of 30 and above.  BMI is usually interpreted the same for males and females.  Weight includes both fat and muscle, so someone with a muscular build, such as an athlete, may have a BMI that is higher than 24.9. In cases like these, BMI may not accurately depict body fat. To determine if excess body fat is the cause of a BMI of 25 or higher, further assessments may need to be done by a health care provider.  WHY IS BMI A USEFUL TOOL?  BMI is used to identify a possible weight problem that may be related to a medical problem or may increase the risk for medical problems. BMI can also be used to promote changes to reach a healthy weight.     This information is not intended to replace advice given to you by your health care provider. Make sure you discuss any questions you have with your health care provider.     Document Released: 08/29/2005 Document Revised: 01/08/2016 Document Reviewed: 05/15/2015  [employer] Interactive Patient Education ©2017 [employer]  Aspirin and Your Heart   Aspirin is a medicine that affects the way blood clots. Aspirin can be used to help reduce the risk of blood clots, heart attacks, and other heart-related problems.   SHOULD I TAKE ASPIRIN?  Your health care provider will help you determine whether it is safe and beneficial for you to take aspirin daily. Taking aspirin daily may be beneficial if you:  · Have had a heart attack or chest pain.  · Have undergone open heart surgery such as coronary artery bypass surgery (CABG).  · Have had coronary angioplasty.  · Have experienced a stroke or transient ischemic attack (TIA).  · Have peripheral vascular disease (PVD).  · Have chronic heart rhythm problems such as atrial fibrillation.  ARE THERE ANY RISKS OF TAKING ASPIRIN DAILY?  Daily use of aspirin can  increase your risk of side effects. Some of these include:  · Bleeding. Bleeding problems can be minor or serious. An example of a minor problem is a cut that does not stop bleeding. An example of a more serious problem is stomach bleeding or bleeding into the brain. Your risk of bleeding is increased if you are also taking non-steroidal anti-inflammatory medicine (NSAIDs).  · Increased bruising.  · Upset stomach.  · An allergic reaction. People who have nasal polyps have an increased risk of developing an aspirin allergy.  WHAT ARE SOME GUIDELINES I SHOULD FOLLOW WHEN TAKING ASPIRIN?   · Take aspirin only as directed by your health care provider. Make sure you understand how much you should take and what form you should take. The two forms of aspirin are:    Non-enteric-coated. This type of aspirin does not have a coating and is absorbed quickly. Non-enteric-coated aspirin is usually recommended for people with chest pain. This type of aspirin also comes in a chewable form.    Enteric-coated. This type of aspirin has a special coating that releases the medicine very slowly. Enteric-coated aspirin causes less stomach upset than non-enteric-coated aspirin. This type of aspirin should not be chewed or crushed.  · Drink alcohol in moderation. Drinking alcohol increases your risk of bleeding.  WHEN SHOULD I SEEK MEDICAL CARE?   · You have unusual bleeding or bruising.  · You have stomach pain.  · You have an allergic reaction. Symptoms of an allergic reaction include:    Hives.    Itchy skin.    Swelling of the lips, tongue, or face.  · You have ringing in your ears.  WHEN SHOULD I SEEK IMMEDIATE MEDICAL CARE?   · Your bowel movements are bloody, dark red, or black in color.  · You vomit or cough up blood.  · You have blood in your urine.  · You cough, wheeze, or feel short of breath.  If you have any of the following symptoms, this is an emergency. Do not wait to see if the pain will go away. Get medical help at once.  Call your local emergency services (911 in the [place] Do not drive yourself to the hospital.  · You have severe chest pain, especially if the pain is crushing or pressure-like and spreads to the arms, back, neck, or jaw.   · You have stroke-like symptoms, such as:      Loss of vision.      Difficulty talking.      Numbness or weakness on one side of your body.      Numbness or weakness in your arm or leg.      Not thinking clearly or feeling confused.       This information is not intended to replace advice given to you by your health care provider. Make sure you discuss any questions you have with your health care provider.     Document Released: 11/30/2009 Document Revised: 01/08/2016 Document Reviewed: 03/25/2015  [email or web address] Interactive Patient Education ©2017 [employer]  Advance Directive  Advance directives are the legal documents that allow you to make choices about your health care and medical treatment if you cannot speak for yourself. Advance directives are a way for you to communicate your wishes to family, friends, and health care providers. The specified people can then convey your decisions about end-of-life care to avoid confusion if you should become unable to communicate.  Ideally, the process of discussing and writing advance directives should happen over time rather than making decisions all at once. Advance directives can be modified as your situation changes, and you can change your mind at any time, even after you have signed the advance directives. Each state has its own laws regarding advance directives.  You may want to check with your health care provider, , or state representative about the law in your state. Below are some examples of advance directives.  HEALTH CARE PROXY AND DURABLE POWER OF  FOR HEALTH CARE  A health care proxy is a person (agent) appointed to make medical decisions for you if you cannot. Generally, people choose someone they know well and trust to  represent their preferences when they can no longer do so. You should be sure to ask this person for agreement to act as your agent. An agent may have to exercise judgment in the event of a medical decision for which your wishes are not known.  A durable power of  for health care is a legal document that names your health care proxy. Depending on the laws in your state, after the document is written, it may also need to be:  · Signed.  · Notarized.  · Dated.  · Copied.  · Witnessed.  · Incorporated into your medical record.  You may also want to appoint someone to manage your financial affairs if you cannot. This is called a durable power of  for finances. It is a separate legal document from the durable power of  for health care. You may choose the same person or someone different from your health care proxy to act as your agent in financial matters.  LIVING WILL  A living will is a set of instructions documenting your wishes about medical care when you cannot care for yourself. It is used if you become:  · Terminally ill.  · Incapacitated.  · Unable to communicate.  · Unable to make decisions.  Items to consider in your living will include:  · The use or non-use of life-sustaining equipment, such as dialysis machines and breathing machines (ventilators).  · A do not resuscitate (DNR) order, which is the instruction not to use cardiopulmonary resuscitation (CPR) if breathing or heartbeat stops.  · Tube feeding.  · Withholding of food and fluids.  · Comfort (palliative) care when the goal becomes comfort rather than a cure.  · Organ and tissue donation.  A living will does not give instructions about distribution of your money and property if you should pass away. It is advisable to seek the expert advice of a  in drawing up a will regarding your possessions. Decisions about taxes, beneficiaries, and asset distribution will be legally binding. This process can relieve your family and  friends of any burdens surrounding disputes or questions that may come up about the allocation of your assets.  DO NOT RESUSCITATE (DNR)  A do not resuscitate (DNR) order is a request to not have CPR in the event that your heart stops beating or you stop breathing. Unless given other instructions, a health care provider will try to help any patient whose heart has stopped or who has stopped breathing.   This information is not intended to replace advice given to you by your health care provider. Make sure you discuss any questions you have with your health care provider.  Document Released: 2009 Document Revised: 04/10/2017 Document Reviewed: 2014  [employer] Interactive Patient Education © 2017 [employer]  Tdap Vaccine (Tetanus, Diphtheria and Pertussis): What You Need to Know  1. Why get vaccinated?  Tetanus, diphtheria and pertussis are very serious diseases. Tdap vaccine can protect us from these diseases. And, Tdap vaccine given to pregnant women can protect  babies against pertussis.  TETANUS (Lockjaw) is rare in the [place] today. It causes painful muscle tightening and stiffness, usually all over the body.  · It can lead to tightening of muscles in the head and neck so you can't open your mouth, swallow, or sometimes even breathe. Tetanus kills about 1 out of 10 people who are infected even after receiving the best medical care.  DIPHTHERIA is also rare in the [place] today. It can cause a thick coating to form in the back of the throat.  · It can lead to breathing problems, heart failure, paralysis, and death.  PERTUSSIS (Whooping Cough) causes severe coughing spells, which can cause difficulty breathing, vomiting and disturbed sleep.  · It can also lead to weight loss, incontinence, and rib fractures. Up to 2 in 100 adolescents and 5 in 100 adults with pertussis are hospitalized or have complications, which could include pneumonia or death.  These diseases are caused by  bacteria. Diphtheria and pertussis are spread from person to person through secretions from coughing or sneezing. Tetanus enters the body through cuts, scratches, or wounds.  Before vaccines, as many as 200,000 cases of diphtheria, 200,000 cases of pertussis, and hundreds of cases of tetanus, were reported in the [place] each year. Since vaccination began, reports of cases for tetanus and diphtheria have dropped by about 99% and for pertussis by about 80%.  2. Tdap vaccine  Tdap vaccine can protect adolescents and adults from tetanus, diphtheria, and pertussis. One dose of Tdap is routinely given at age 11 or 12. People who did not get Tdap at that age should get it as soon as possible.  Tdap is especially important for healthcare professionals and anyone having close contact with a baby younger than 12 months.  Pregnant women should get a dose of Tdap during every pregnancy, to protect the  from pertussis. Infants are most at risk for severe, life-threatening complications from pertussis.  Another vaccine, called Td, protects against tetanus and diphtheria, but not pertussis. A Td booster should be given every 10 years. Tdap may be given as one of these boosters if you have never gotten Tdap before. Tdap may also be given after a severe cut or burn to prevent tetanus infection.  Your doctor or the person giving you the vaccine can give you more information.  Tdap may safely be given at the same time as other vaccines.  3. Some people should not get this vaccine  · A person who has ever had a life-threatening allergic reaction after a previous dose of any diphtheria, tetanus or pertussis containing vaccine, OR has a severe allergy to any part of this vaccine, should not get Tdap vaccine. Tell the person giving the vaccine about any severe allergies.  · Anyone who had coma or long repeated seizures within 7 days after a childhood dose of DTP or DTaP, or a previous dose of Tdap, should not get Tdap,  unless a cause other than the vaccine was found. They can still get Td.  · Talk to your doctor if you:    have seizures or another nervous system problem,    had severe pain or swelling after any vaccine containing diphtheria, tetanus or pertussis,    ever had a condition called Guillain-Barré Syndrome (GBS),    aren't feeling well on the day the shot is scheduled.  4. Risks  With any medicine, including vaccines, there is a chance of side effects. These are usually mild and go away on their own. Serious reactions are also possible but are rare.  Most people who get Tdap vaccine do not have any problems with it.  Mild problems following Tdap  (Did not interfere with activities)  · Pain where the shot was given (about 3 in 4 adolescents or 2 in 3 adults)  · Redness or swelling where the shot was given (about 1 person in 5)  · Mild fever of at least 100.4°F (up to about 1 in 25 adolescents or 1 in 100 adults)  · Headache (about 3 or 4 people in 10)  · Tiredness (about 1 person in 3 or 4)  · Nausea, vomiting, diarrhea, stomach ache (up to 1 in 4 adolescents or 1 in 10 adults)  · Chills, sore joints (about 1 person in 10)  · Body aches (about 1 person in 3 or 4)  · Rash, swollen glands (uncommon)  Moderate problems following Tdap  (Interfered with activities, but did not require medical attention)  · Pain where the shot was given (up to 1 in 5 or 6)  · Redness or swelling where the shot was given (up to about 1 in 16 adolescents or 1 in 12 adults)  · Fever over 102°F (about 1 in 100 adolescents or 1 in 250 adults)  · Headache (about 1 in 7 adolescents or 1 in 10 adults)  · Nausea, vomiting, diarrhea, stomach ache (up to 1 or 3 people in 100)  · Swelling of the entire arm where the shot was given (up to about 1 in 500).  Severe problems following Tdap  (Unable to perform usual activities; required medical attention)  · Swelling, severe pain, bleeding and redness in the arm where the shot was given (rare).  Problems that  could happen after any vaccine:  · People sometimes faint after a medical procedure, including vaccination. Sitting or lying down for about 15 minutes can help prevent fainting, and injuries caused by a fall. Tell your doctor if you feel dizzy, or have vision changes or ringing in the ears.  · Some people get severe pain in the shoulder and have difficulty moving the arm where a shot was given. This happens very rarely.  · Any medication can cause a severe allergic reaction. Such reactions from a vaccine are very rare, estimated at fewer than 1 in a million doses, and would happen within a few minutes to a few hours after the vaccination.  As with any medicine, there is a very remote chance of a vaccine causing a serious injury or death.  The safety of vaccines is always being monitored. For more information, visit: www.cdc.gov/vaccinesafety/  5. What if there is a serious problem?  What should I look for?  · Look for anything that concerns you, such as signs of a severe allergic reaction, very high fever, or unusual behavior.  · Signs of a severe allergic reaction can include hives, swelling of the face and throat, difficulty breathing, a fast heartbeat, dizziness, and weakness. These would usually start a few minutes to a few hours after the vaccination.  What should I do?  · If you think it is a severe allergic reaction or other emergency that can't wait, call 9-1-1 or get the person to the nearest hospital. Otherwise, call your doctor.  · Afterward, the reaction should be reported to the Vaccine Adverse Event Reporting System (VAERS). Your doctor might file this report, or you can do it yourself through the VAERS web site at www.vaers.hhs.gov, or by calling 1-553.279.7093.  VAERS does not give medical advice.   6. The National Vaccine Injury Compensation Program  The National Vaccine Injury Compensation Program (VICP) is a federal program that was created to compensate people who may have been injured by certain  vaccines.  Persons who believe they may have been injured by a vaccine can learn about the program and about filing a claim by calling 1-359.862.2602 or visiting the [facility] website at www.[facility].gov/vaccinecompensation. There is a time limit to file a claim for compensation.  7. How can I learn more?  · Ask your doctor. He or she can give you the vaccine package insert or suggest other sources of information.  · Call your local or state health department.  · Contact the Centers for Disease Control and Prevention (CDC):    Call 1-157.704.5064 (9-692-JMM-INFO) or    Visit CDC's website at www.cdc.gov/vaccines  CDC Tdap Vaccine VIS (2/24/15)     This information is not intended to replace advice given to you by your health care provider. Make sure you discuss any questions you have with your health care provider.     Document Released: 06/18/2013 Document Revised: 01/08/2016 Document Reviewed: 04/01/2015  [employer] Interactive Patient Education ©2017 [employer]

## 2018-01-02 NOTE — PROGRESS NOTES
QUICK REFERENCE INFORMATION:  The ABCs of the Annual Wellness Visit    Subsequent Medicare Wellness Visit    HEALTH RISK ASSESSMENT    1935    Recent Hospitalizations:  No hospitalization(s) within the last year..        Current Medical Providers:  Patient Care Team:  Johan Honeycutt MD as PCP - Internal Medicine        Smoking Status:  History   Smoking Status   • Former Smoker   • Years: 28.00   • Start date: 1967   • Quit date: 1995   Smokeless Tobacco   • Never Used     Comment: less than 1 pack per day for 28 years        Alcohol Consumption:  History   Alcohol Use No       Depression Screen:   PHQ-2/PHQ-9 Depression Screening 1/2/2018   Little interest or pleasure in doing things 1   Feeling down, depressed, or hopeless 0   Trouble falling or staying asleep, or sleeping too much 0   Feeling tired or having little energy 1   Poor appetite or overeating 0   Feeling bad about yourself - or that you are a failure or have let yourself or your family down 0   Trouble concentrating on things, such as reading the newspaper or watching television 0   Moving or speaking so slowly that other people could have noticed. Or the opposite - being so fidgety or restless that you have been moving around a lot more than usual 0   Thoughts that you would be better off dead, or of hurting yourself in some way 0   Total Score 2   If you checked off any problems, how difficult have these problems made it for you to do your work, take care of things at home, or get along with other people? Somewhat difficult       Health Habits and Functional and Cognitive Screening:  Functional & Cognitive Status 1/2/2018   Do you have difficulty preparing food and eating? No   Do you have difficulty bathing yourself, getting dressed or grooming yourself? No   Do you have difficulty using the toilet? No   Do you have difficulty moving around from place to place? No   Do you have trouble with steps or getting out of a bed or a chair? No   In the  past year have you fallen or experienced a near fall? No   Current Diet Well Balanced Diet   Dental Exam Up to date   Eye Exam Up to date   Exercise (times per week) 0 times per week   Current Exercise Activities Include None   Do you need help using the phone?  No   Are you deaf or do you have serious difficulty hearing?  No   Do you need help with transportation? No   Do you need help shopping? No   Do you need help preparing meals?  No   Do you need help with housework?  No   Do you need help with laundry? No   Do you need help taking your medications? No   Do you need help managing money? No   Have you felt unusual stress, anger or loneliness in the last month? No   Who do you live with? Child   If you need help, do you have trouble finding someone available to you? No   Have you been bothered in the last four weeks by sexual problems? No   Do you have difficulty concentrating, remembering or making decisions? No           Does the patient have evidence of cognitive impairment? No    Aspirin use counseling: Does not need ASA (and currently is not on it)      Recent Lab Results:  CMP:  Lab Results   Component Value Date    GLU 90 10/23/2017    BUN 25 (H) 10/23/2017    CREATININE 0.94 10/23/2017    EGFRIFNONA 57 (L) 10/23/2017    EGFRIFAFRI 69 10/23/2017    BCR 26.6 (H) 10/23/2017     10/23/2017    K 4.6 10/23/2017    CO2 25.6 10/23/2017    CALCIUM 10.0 10/23/2017    PROTENTOTREF 7.0 10/23/2017    ALBUMIN 4.50 10/23/2017    LABGLOBREF 2.5 10/23/2017    LABIL2 1.8 10/23/2017    BILITOT 0.6 10/23/2017    ALKPHOS 78 10/23/2017    AST 21 10/23/2017    ALT 15 10/23/2017     Lipid Panel:  Lab Results   Component Value Date    TRIG 81 10/23/2017    HDL 87 (H) 10/23/2017    VLDL 16.2 10/23/2017     HbA1c:       Visual Acuity:  No exam data present    Age-appropriate Screening Schedule:  Refer to the list below for future screening recommendations based on patient's age, sex and/or medical conditions. Orders for  these recommended tests are listed in the plan section. The patient has been provided with a written plan.    Health Maintenance   Topic Date Due   • TDAP/TD VACCINES (1 - Tdap) 01/02/2011   • MAMMOGRAM  01/09/2019   • INFLUENZA VACCINE  Completed   • PNEUMOCOCCAL VACCINES (65+ LOW/MEDIUM RISK)  Completed   • ZOSTER VACCINE  Completed   • COLONOSCOPY  Excluded        Subjective   History of Present Illness    Gladys Izquierdo is a 82 y.o. female who presents for an Subsequent Wellness Visit.    The following portions of the patient's history were reviewed and updated as appropriate: allergies, current medications, past family history, past medical history, past social history, past surgical history and problem list.    Outpatient Medications Prior to Visit   Medication Sig Dispense Refill   • losartan (COZAAR) 100 MG tablet TAKE 1 TABLET BY MOUTH DAILY 90 tablet 3   • Magnesium 300 MG capsule Take 1 tablet by mouth Daily.     • nystatin (MYCOSTATIN) 401813 UNIT/GM powder Apply  topically daily. 1 each 3   • PROAIR  (90 Base) MCG/ACT inhaler INHALE 2 PUFFS BY MOUTH EVERY 4 HOURS AS NEEDED FOR WHEEZING ' 8.5 g 2   • Probiotic Product (PROBIOTIC ADVANCED PO) Take 1 tablet by mouth Daily.     • amoxicillin-clavulanate (AUGMENTIN) 875-125 MG per tablet Take 1 tablet by mouth 2 (Two) Times a Day. 20 tablet 0   • azithromycin (ZITHROMAX) 250 MG tablet Take 2 tablets the first day, then 1 tablet daily for 4 days. 6 tablet 0     No facility-administered medications prior to visit.        Patient Active Problem List   Diagnosis   • Hypertension   • Colon polyps   • UTI (urinary tract infection)   • Asthma, cold induced   • DDD (degenerative disc disease), thoracolumbar   • Psoriasis       Advance Care Planning:  has an advance directive - a copy HAS NOT been provided. Have asked the patient to send this to us to add to record.    Identification of Risk Factors:  Risk factors include: chronic pain.    Review of  "Systems    Compared to one year ago, the patient feels her physical health is the same.  Compared to one year ago, the patient feels her mental health is the same.    Objective     Physical Exam    Vitals:    01/02/18 0859   BP: 140/76   BP Location: Left arm   Patient Position: Sitting   Cuff Size: Adult   Pulse: 98   Resp: 16   Temp: 98 °F (36.7 °C)   TempSrc: Oral   SpO2: 96%   Weight: 66.6 kg (146 lb 12.8 oz)   Height: 154.9 cm (60.98\")   PainSc: 0-No pain       Body mass index is 27.75 kg/(m^2).  Discussed the patient's BMI with her. BMI is within normal parameters. No follow-up required.    Assessment/Plan   Patient Self-Management and Personalized Health Advice  The patient has been provided with information about: diet, exercise, fall prevention and designing advance directives and preventive services including:   · Advance directive, Exercise counseling provided, Nutrition counseling provided, Smoking cessation counseling completed.    Visit Diagnoses:  No diagnosis found.    No orders of the defined types were placed in this encounter.      Outpatient Encounter Prescriptions as of 1/2/2018   Medication Sig Dispense Refill   • calcium-vitamin D (OSCAL-500) 500-200 MG-UNIT per tablet Take 2 tablets by mouth.     • losartan (COZAAR) 100 MG tablet TAKE 1 TABLET BY MOUTH DAILY 90 tablet 3   • Magnesium 300 MG capsule Take 1 tablet by mouth Daily.     • Multiple Vitamins-Minerals (CENTRUM ADULTS) tablet Take 1 tablet by mouth.     • nystatin (MYCOSTATIN) 962177 UNIT/GM powder Apply  topically daily. 1 each 3   • Omega-3 Fatty Acids (FISH OIL) 1000 MG capsule capsule Take 2 g by mouth.     • PROAIR  (90 Base) MCG/ACT inhaler INHALE 2 PUFFS BY MOUTH EVERY 4 HOURS AS NEEDED FOR WHEEZING ' 8.5 g 2   • Probiotic Product (PROBIOTIC ADVANCED PO) Take 1 tablet by mouth Daily.     • [DISCONTINUED] amoxicillin-clavulanate (AUGMENTIN) 875-125 MG per tablet Take 1 tablet by mouth 2 (Two) Times a Day. 20 tablet 0   • " [DISCONTINUED] azithromycin (ZITHROMAX) 250 MG tablet Take 2 tablets the first day, then 1 tablet daily for 4 days. 6 tablet 0     No facility-administered encounter medications on file as of 1/2/2018.        Reviewed use of high risk medication in the elderly: yes  Reviewed for potential of harmful drug interactions in the elderly: yes    Follow Up:  No Follow-up on file.     An After Visit Summary and PPPS with all of these plans were given to the patient.

## 2018-01-02 NOTE — PROGRESS NOTES
Subjective   Gladys Izquierdo is a 82 y.o. female.     Chief Complaint   Patient presents with   • Cough     99.0 FEVER ONCE   • Nasal Congestion         Cough   This is a new problem. The current episode started more than 1 month ago. The problem has been waxing and waning. The problem occurs every few hours. The cough is productive of sputum and productive of brown sputum. Associated symptoms include nasal congestion, rhinorrhea and a sore throat. Pertinent negatives include no chills, fever, headaches, myalgias, postnasal drip or shortness of breath. Nothing aggravates the symptoms. She has tried OTC cough suppressant for the symptoms. The treatment provided moderate relief. There is no history of asthma, COPD or emphysema.        The following portions of the patient's history were reviewed and updated as appropriate: allergies, current medications, past social history and problem list.    Outpatient Prescriptions Marked as Taking for the 1/2/18 encounter (Office Visit) with Johan Honeycutt MD   Medication Sig Dispense Refill   • calcium-vitamin D (OSCAL-500) 500-200 MG-UNIT per tablet Take 2 tablets by mouth.     • losartan (COZAAR) 100 MG tablet TAKE 1 TABLET BY MOUTH DAILY 90 tablet 3   • Magnesium 300 MG capsule Take 1 tablet by mouth Daily.     • Multiple Vitamins-Minerals (CENTRUM ADULTS) tablet Take 1 tablet by mouth.     • nystatin (MYCOSTATIN) 944658 UNIT/GM powder Apply  topically daily. 1 each 3   • Omega-3 Fatty Acids (FISH OIL) 1000 MG capsule capsule Take 2 g by mouth.     • PROAIR  (90 Base) MCG/ACT inhaler INHALE 2 PUFFS BY MOUTH EVERY 4 HOURS AS NEEDED FOR WHEEZING ' 8.5 g 2   • Probiotic Product (PROBIOTIC ADVANCED PO) Take 1 tablet by mouth Daily.         Review of Systems   Constitutional: Negative for chills and fever.   HENT: Positive for rhinorrhea and sore throat. Negative for postnasal drip.    Respiratory: Positive for cough. Negative for shortness of breath.    Musculoskeletal:  Negative for myalgias.   Neurological: Negative for headaches.       Objective   Vitals:    01/02/18 0859   BP: 140/76   Pulse: 98   Resp: 16   Temp: 98 °F (36.7 °C)   SpO2: 96%      Last Weight    01/02/18 0859   Weight: 66.6 kg (146 lb 12.8 oz)    [unfilled]  Body mass index is 27.75 kg/(m^2).      Physical Exam   Constitutional: She appears well-developed and well-nourished.   HENT:   Head: Normocephalic and atraumatic.   Right Ear: External ear normal.   Left Ear: External ear normal.   Nose: Nose normal.   Mouth/Throat: Oropharynx is clear and moist.   Eyes: Conjunctivae are normal. Pupils are equal, round, and reactive to light.   Pulmonary/Chest: Effort normal and breath sounds normal. No respiratory distress. She has no wheezes. She has no rales.   Skin: Skin is warm and dry.         Problem List Items Addressed This Visit     None      Visit Diagnoses     Bronchitis    -  Primary        Assessment/Plan   In with a 6 week illness.  Cough.  Brown sputum reduction.  She was treated with 10 days of Augmentin and seemed to be better.  Symptoms seem to be waxing and waning.  Cough is getting worse again.  We'll give her another round of Augmentin.  I think she is having recurrent bronchitis.  Will update TD AP today.       Dragon disclaimer:   Much of this encounter note is an electronic transcription/translation of spoken language to printed text. The electronic translation of spoken language may permit erroneous, or at times, nonsensical words or phrases to be inadvertently transcribed; Although I have reviewed the note for such errors, some may still exist.

## 2018-01-15 ENCOUNTER — HOSPITAL ENCOUNTER (OUTPATIENT)
Dept: MAMMOGRAPHY | Facility: HOSPITAL | Age: 83
Discharge: HOME | End: 2018-01-15
Attending: INTERNAL MEDICINE | Admitting: INTERNAL MEDICINE

## 2018-01-15 DIAGNOSIS — Z12.31 VISIT FOR SCREENING MAMMOGRAM: ICD-10-CM

## 2018-01-15 PROCEDURE — 77063 BREAST TOMOSYNTHESIS BI: CPT

## 2018-01-15 PROCEDURE — 77067 SCR MAMMO BI INCL CAD: CPT

## 2018-02-12 ENCOUNTER — OFFICE VISIT (OUTPATIENT)
Dept: INTERNAL MEDICINE | Facility: CLINIC | Age: 83
End: 2018-02-12

## 2018-02-12 VITALS
HEART RATE: 86 BPM | WEIGHT: 145 LBS | BODY MASS INDEX: 27.38 KG/M2 | SYSTOLIC BLOOD PRESSURE: 100 MMHG | RESPIRATION RATE: 16 BRPM | OXYGEN SATURATION: 95 % | HEIGHT: 61 IN | DIASTOLIC BLOOD PRESSURE: 70 MMHG | TEMPERATURE: 98.1 F

## 2018-02-12 DIAGNOSIS — R68.89 FLU-LIKE SYMPTOMS: Primary | ICD-10-CM

## 2018-02-12 LAB
EXPIRATION DATE: NORMAL
INTERNAL CONTROL: NORMAL
Lab: NORMAL
S PYO AG THROAT QL: NEGATIVE

## 2018-02-12 PROCEDURE — 99213 OFFICE O/P EST LOW 20 MIN: CPT | Performed by: INTERNAL MEDICINE

## 2018-02-12 PROCEDURE — 87880 STREP A ASSAY W/OPTIC: CPT | Performed by: INTERNAL MEDICINE

## 2018-02-12 NOTE — PROGRESS NOTES
Subjective   Gladys Izquierdo is a 83 y.o. female.     Chief Complaint   Patient presents with   • Sore Throat     cough, no phlemb   • Diarrhea     x 1 day         Sore Throat    This is a new problem. The current episode started in the past 7 days. The problem has been unchanged. Neither side of throat is experiencing more pain than the other. There has been no fever. The pain is mild. Associated symptoms include diarrhea and headaches. Pertinent negatives include no congestion, ear discharge, ear pain, shortness of breath or vomiting. She has tried nothing for the symptoms. The treatment provided no relief.        The following portions of the patient's history were reviewed and updated as appropriate: allergies, current medications, past social history and problem list.    Outpatient Prescriptions Marked as Taking for the 2/12/18 encounter (Office Visit) with Johan Honeycutt MD   Medication Sig Dispense Refill   • calcium-vitamin D (OSCAL-500) 500-200 MG-UNIT per tablet Take 2 tablets by mouth.     • losartan (COZAAR) 100 MG tablet TAKE 1 TABLET BY MOUTH DAILY 90 tablet 3   • Magnesium 300 MG capsule Take 1 tablet by mouth Daily.     • Multiple Vitamins-Minerals (CENTRUM ADULTS) tablet Take 1 tablet by mouth.     • nystatin (MYCOSTATIN) 078917 UNIT/GM powder Apply  topically daily. 1 each 3   • Omega-3 Fatty Acids (FISH OIL) 1000 MG capsule capsule Take 2 g by mouth.     • PROAIR  (90 Base) MCG/ACT inhaler INHALE 2 PUFFS BY MOUTH EVERY 4 HOURS AS NEEDED FOR WHEEZING ' 8.5 g 2   • Probiotic Product (PROBIOTIC ADVANCED PO) Take 1 tablet by mouth Daily.         Review of Systems   Constitutional: Positive for chills and fever.   HENT: Positive for rhinorrhea and sore throat. Negative for congestion, ear discharge, ear pain, postnasal drip and sinus pain.    Respiratory: Negative for shortness of breath.    Gastrointestinal: Positive for diarrhea. Negative for nausea and vomiting.   Musculoskeletal: Positive for  myalgias.   Neurological: Positive for headaches.       Objective   Vitals:    02/12/18 1123   BP: 100/70   Pulse: 86   Resp: 16   Temp: 98.1 °F (36.7 °C)   SpO2: 95%      Last Weight    02/12/18  1123   Weight: 65.8 kg (145 lb)    [unfilled]  Body mass index is 27.41 kg/(m^2).      Physical Exam   Constitutional: She appears well-developed and well-nourished.   HENT:   Head: Normocephalic and atraumatic.   Right Ear: External ear normal.   Left Ear: External ear normal.   Nose: Nose normal.   Mouth/Throat: Oropharynx is clear and moist.   Eyes: Conjunctivae are normal. Pupils are equal, round, and reactive to light.   Pulmonary/Chest: Effort normal and breath sounds normal. No respiratory distress. She has no wheezes. She has no rales.   Skin: Skin is warm and dry.         Problem List Items Addressed This Visit     None      Visit Diagnoses     Flu-like symptoms    -  Primary        Assessment/Plan   Scot back from a trip to [place] in [place] about one week ago.  4 days ago she began with slight sore throat.  Runny nose.  Cough.  Headache and body aches.  Some diarrhea.  This is likely the flu.  Currently epidemic.  Too late to treat.  For now will treat symptomatically with Tylenol, fluids, and rest.         Dragon disclaimer:   Much of this encounter note is an electronic transcription/translation of spoken language to printed text. The electronic translation of spoken language may permit erroneous, or at times, nonsensical words or phrases to be inadvertently transcribed; Although I have reviewed the note for such errors, some may still exist.

## 2018-02-14 ENCOUNTER — TELEPHONE (OUTPATIENT)
Dept: INTERNAL MEDICINE | Facility: CLINIC | Age: 83
End: 2018-02-14

## 2018-02-14 NOTE — TELEPHONE ENCOUNTER
The patient was in the office on Monday with flu-like sx. She has been taking Tylenol and drinking plenty of fluids, but is not feeling any better. She has started coughing up dark brown sputum and is concerned about that. She asked if an antibiotic would help clear up the sputum. Please advise. Thanks!    I don't think so.  I would give it another week.

## 2018-10-31 ENCOUNTER — OFFICE VISIT (OUTPATIENT)
Dept: INTERNAL MEDICINE | Facility: CLINIC | Age: 83
End: 2018-10-31

## 2018-10-31 VITALS
WEIGHT: 145 LBS | DIASTOLIC BLOOD PRESSURE: 78 MMHG | HEIGHT: 58 IN | BODY MASS INDEX: 30.44 KG/M2 | SYSTOLIC BLOOD PRESSURE: 128 MMHG

## 2018-10-31 DIAGNOSIS — D12.4 ADENOMATOUS POLYP OF DESCENDING COLON: Chronic | ICD-10-CM

## 2018-10-31 DIAGNOSIS — Z86.79 H/O CEREBRAL ANEURYSM REPAIR: ICD-10-CM

## 2018-10-31 DIAGNOSIS — J45.20 MILD INTERMITTENT COLD-INDUCED ASTHMA WITHOUT COMPLICATION: Chronic | ICD-10-CM

## 2018-10-31 DIAGNOSIS — M51.35 DDD (DEGENERATIVE DISC DISEASE), THORACOLUMBAR: ICD-10-CM

## 2018-10-31 DIAGNOSIS — I10 ESSENTIAL HYPERTENSION: Primary | ICD-10-CM

## 2018-10-31 DIAGNOSIS — L40.9 PSORIASIS: Chronic | ICD-10-CM

## 2018-10-31 DIAGNOSIS — Z98.890 H/O CEREBRAL ANEURYSM REPAIR: ICD-10-CM

## 2018-10-31 LAB
ALBUMIN SERPL-MCNC: 4.8 G/DL (ref 3.5–5.2)
ALBUMIN/GLOB SERPL: 2.1 G/DL
ALP SERPL-CCNC: 79 U/L (ref 39–117)
ALT SERPL-CCNC: 15 U/L (ref 1–33)
AST SERPL-CCNC: 22 U/L (ref 1–32)
BASOPHILS # BLD AUTO: 0.05 10*3/MM3 (ref 0–0.2)
BASOPHILS NFR BLD AUTO: 0.7 % (ref 0–2)
BILIRUB SERPL-MCNC: 0.5 MG/DL (ref 0.1–1.2)
BUN SERPL-MCNC: 21 MG/DL (ref 8–23)
BUN/CREAT SERPL: 20.4 (ref 7–25)
CALCIUM SERPL-MCNC: 10.2 MG/DL (ref 8.6–10.5)
CHLORIDE SERPL-SCNC: 103 MMOL/L (ref 98–107)
CO2 SERPL-SCNC: 27.9 MMOL/L (ref 22–29)
CREAT SERPL-MCNC: 1.03 MG/DL (ref 0.57–1)
DEPRECATED RDW RBC AUTO: 46.5 FL (ref 37–54)
EOSINOPHIL # BLD AUTO: 0.18 10*3/MM3 (ref 0–0.7)
EOSINOPHIL NFR BLD AUTO: 2.6 % (ref 0–5)
ERYTHROCYTE [DISTWIDTH] IN BLOOD BY AUTOMATED COUNT: 13 % (ref 11.5–15)
GLOBULIN SER CALC-MCNC: 2.3 GM/DL
GLUCOSE SERPL-MCNC: 104 MG/DL (ref 65–99)
HCT VFR BLD AUTO: 39.6 % (ref 34.1–44.9)
HGB BLD-MCNC: 12.6 G/DL (ref 11.2–15.7)
LYMPHOCYTES # BLD AUTO: 1.67 10*3/MM3 (ref 0.8–7)
LYMPHOCYTES NFR BLD AUTO: 24 % (ref 10–60)
MCH RBC QN AUTO: 31.9 PG (ref 26–34)
MCHC RBC AUTO-ENTMCNC: 31.8 G/DL (ref 31–37)
MCV RBC AUTO: 100.3 FL (ref 80–100)
MONOCYTES # BLD AUTO: 0.69 10*3/MM3 (ref 0–1)
MONOCYTES NFR BLD AUTO: 9.9 % (ref 0–13)
NEUTROPHILS # BLD AUTO: 4.37 10*3/MM3 (ref 1–11)
NEUTROPHILS NFR BLD AUTO: 62.8 % (ref 30–85)
PLATELET # BLD AUTO: 277 10*3/MM3 (ref 150–450)
PMV BLD AUTO: 9.8 FL (ref 6–12)
POTASSIUM SERPL-SCNC: 4.7 MMOL/L (ref 3.5–5.2)
PROT SERPL-MCNC: 7.1 G/DL (ref 6–8.5)
RBC # BLD AUTO: 3.95 10*6/MM3 (ref 3.93–5.22)
SODIUM SERPL-SCNC: 145 MMOL/L (ref 136–145)
TSH SERPL-ACNC: 5.17 MIU/ML (ref 0.27–4.2)
WBC NRBC COR # BLD: 6.96 10*3/MM3 (ref 5–10)

## 2018-10-31 PROCEDURE — 99214 OFFICE O/P EST MOD 30 MIN: CPT | Performed by: INTERNAL MEDICINE

## 2018-10-31 PROCEDURE — 36415 COLL VENOUS BLD VENIPUNCTURE: CPT | Performed by: INTERNAL MEDICINE

## 2018-10-31 PROCEDURE — 85025 COMPLETE CBC W/AUTO DIFF WBC: CPT | Performed by: INTERNAL MEDICINE

## 2018-10-31 NOTE — PROGRESS NOTES
Subjective   Gladys Izquierdo is a 83 y.o. female. Presents with a chief complaint of HTN, Hyperlipidemia, episodic asthma, s/p cerebral artery aneurysm repair, Psoriasis, DDD here for an initial interview. She is doing well overall.    History of Present Illness     The following portions of the patient's history were reviewed and updated as appropriate: allergies, current medications, past family history, past medical history, past social history, past surgical history and problem list.    Review of Systems   Constitutional: Positive for fatigue.   HENT: Negative.    Eyes: Negative.    Respiratory: Negative.    Cardiovascular: Negative.    Gastrointestinal: Negative.    Endocrine: Negative.    Genitourinary: Negative.    Musculoskeletal: Positive for arthralgias.   Skin: Negative.    Allergic/Immunologic: Negative.    Neurological: Negative.    Hematological: Negative.    Psychiatric/Behavioral: Negative.        Objective   Physical Exam   Constitutional: She is oriented to person, place, and time. She appears well-developed and well-nourished.   HENT:   Head: Normocephalic and atraumatic.   Eyes: Pupils are equal, round, and reactive to light. EOM are normal.   Neck: Normal range of motion. Neck supple.   Cardiovascular: Normal rate and regular rhythm.    Murmur heard.  Pulmonary/Chest: Effort normal and breath sounds normal.   Abdominal: Soft. Bowel sounds are normal.   Musculoskeletal: Normal range of motion.   Neurological: She is alert and oriented to person, place, and time.   Skin: Skin is warm and dry.   Psychiatric: She has a normal mood and affect. Her behavior is normal. Judgment and thought content normal.   Nursing note and vitals reviewed.        Assessment/Plan   Gladys was seen today for hypertension.    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

## 2018-11-08 ENCOUNTER — TELEPHONE (OUTPATIENT)
Dept: INTERNAL MEDICINE | Facility: CLINIC | Age: 83
End: 2018-11-08

## 2018-11-08 NOTE — TELEPHONE ENCOUNTER
----- Message from Valerie Rizo sent at 11/8/2018  9:25 AM EST -----  Contact: Pt  Pt is still having sever pains.   Pain   in neck and around back/shoulders.  6th day, now she is not sleeping.  No breathing trouble.  No chest pain.  No nausea.    She was baby sitting her grand kids, And things she over did it.  But wanted to talk to MD to make sure.      Pt# [phone]

## 2018-11-09 ENCOUNTER — OFFICE VISIT (OUTPATIENT)
Dept: INTERNAL MEDICINE | Facility: CLINIC | Age: 83
End: 2018-11-09

## 2018-11-09 VITALS
HEIGHT: 58 IN | SYSTOLIC BLOOD PRESSURE: 124 MMHG | WEIGHT: 149 LBS | BODY MASS INDEX: 31.28 KG/M2 | DIASTOLIC BLOOD PRESSURE: 78 MMHG

## 2018-11-09 DIAGNOSIS — L40.9 PSORIASIS: ICD-10-CM

## 2018-11-09 DIAGNOSIS — M54.2 NECK PAIN, ACUTE: ICD-10-CM

## 2018-11-09 DIAGNOSIS — I10 ESSENTIAL HYPERTENSION: Primary | Chronic | ICD-10-CM

## 2018-11-09 DIAGNOSIS — M51.35 DDD (DEGENERATIVE DISC DISEASE), THORACOLUMBAR: ICD-10-CM

## 2018-11-09 PROCEDURE — 99214 OFFICE O/P EST MOD 30 MIN: CPT | Performed by: INTERNAL MEDICINE

## 2018-11-09 RX ORDER — BACLOFEN 10 MG/1
10 TABLET ORAL DAILY
Qty: 20 TABLET | Refills: 0 | Status: ON HOLD | OUTPATIENT
Start: 2018-11-09 | End: 2020-12-04

## 2018-11-09 RX ORDER — NAPROXEN 500 MG/1
500 TABLET ORAL 2 TIMES DAILY WITH MEALS
Qty: 60 TABLET | Refills: 3 | Status: ON HOLD | OUTPATIENT
Start: 2018-11-09 | End: 2020-12-04

## 2018-11-09 NOTE — PROGRESS NOTES
Subjective   Gladys Izquierdo is a 83 y.o. female. Presents with a chief complaint of psoriasis,HTN, thoracic DD, with acute neck pain from lifting a 2 year old one week ago.    History of Present Illness     The following portions of the patient's history were reviewed and updated as appropriate: allergies, current medications, past family history, past medical history, past social history, past surgical history and problem list.    Review of Systems   Constitutional: Positive for fatigue.   HENT: Negative.    Eyes: Negative.    Respiratory: Negative.    Musculoskeletal: Positive for arthralgias and neck pain.       Objective   Physical Exam   Constitutional: She is oriented to person, place, and time. She appears well-developed and well-nourished.   HENT:   Head: Normocephalic and atraumatic.   Eyes: Pupils are equal, round, and reactive to light. EOM are normal.   Neck: Normal range of motion.   Left sided muscle spasms   Cardiovascular: Normal rate, regular rhythm and normal heart sounds.    Pulmonary/Chest: Effort normal and breath sounds normal.   Abdominal: Soft. Bowel sounds are normal.   Neurological: She is alert and oriented to person, place, and time.   Nursing note and vitals reviewed.        Assessment/Plan   Gladys was seen today for back pain.    Diagnoses and all orders for this visit:    Essential hypertension  Comments:  well controlled    Psoriasis  Comments:  stable for now    DDD (degenerative disc disease), thoracolumbar  Comments:  anaprox ds bid    Neck pain, acute  Comments:  baclofen 10 mg QHS    Other orders  -     naproxen (EC NAPROSYN) 500 MG EC tablet; Take 1 tablet by mouth 2 (Two) Times a Day With Meals.  -     baclofen (LIORESAL) 10 MG tablet; Take 1 tablet by mouth Daily.

## 2018-11-09 NOTE — PROGRESS NOTES
Patient's pharmacy, [facility], called to say naproxen extended release is not available.  Per Dr. Lackey, patient can have regular naproxen at same dosage.

## 2018-11-21 RX ORDER — LOSARTAN POTASSIUM 100 MG/1
TABLET ORAL
Qty: 90 TABLET | Refills: 3 | Status: SHIPPED | OUTPATIENT
Start: 2018-11-21 | End: 2019-12-06 | Stop reason: SDUPTHER

## 2018-12-07 ENCOUNTER — TRANSCRIBE ORDERS (OUTPATIENT)
Dept: ADMINISTRATIVE | Facility: HOSPITAL | Age: 83
End: 2018-12-07

## 2018-12-07 DIAGNOSIS — Z13.9 VISIT FOR SCREENING: Primary | ICD-10-CM

## 2019-01-16 ENCOUNTER — HOSPITAL ENCOUNTER (OUTPATIENT)
Dept: MAMMOGRAPHY | Facility: HOSPITAL | Age: 84
Discharge: HOME | End: 2019-01-16
Admitting: INTERNAL MEDICINE

## 2019-01-16 DIAGNOSIS — Z13.9 VISIT FOR SCREENING: ICD-10-CM

## 2019-01-16 PROCEDURE — 77063 BREAST TOMOSYNTHESIS BI: CPT

## 2019-01-16 PROCEDURE — 77067 SCR MAMMO BI INCL CAD: CPT

## 2019-04-29 ENCOUNTER — OFFICE VISIT (OUTPATIENT)
Dept: INTERNAL MEDICINE | Facility: CLINIC | Age: 84
End: 2019-04-29

## 2019-04-29 VITALS
SYSTOLIC BLOOD PRESSURE: 110 MMHG | WEIGHT: 149 LBS | DIASTOLIC BLOOD PRESSURE: 78 MMHG | BODY MASS INDEX: 31.28 KG/M2 | HEIGHT: 58 IN

## 2019-04-29 DIAGNOSIS — L40.9 PSORIASIS: ICD-10-CM

## 2019-04-29 DIAGNOSIS — I10 ESSENTIAL HYPERTENSION: Primary | ICD-10-CM

## 2019-04-29 DIAGNOSIS — R79.89 ELEVATED TSH: ICD-10-CM

## 2019-04-29 DIAGNOSIS — R30.0 DYSURIA: ICD-10-CM

## 2019-04-29 DIAGNOSIS — Z86.79 H/O CEREBRAL ANEURYSM REPAIR: ICD-10-CM

## 2019-04-29 DIAGNOSIS — M51.35 DDD (DEGENERATIVE DISC DISEASE), THORACOLUMBAR: Chronic | ICD-10-CM

## 2019-04-29 DIAGNOSIS — Z98.890 H/O CEREBRAL ANEURYSM REPAIR: ICD-10-CM

## 2019-04-29 DIAGNOSIS — J45.20 MILD INTERMITTENT COLD-INDUCED ASTHMA WITHOUT COMPLICATION: ICD-10-CM

## 2019-04-29 LAB
ALBUMIN SERPL-MCNC: 4.6 G/DL (ref 3.5–5.2)
ALBUMIN/GLOB SERPL: 1.6 G/DL
ALP SERPL-CCNC: 73 U/L (ref 39–117)
ALT SERPL-CCNC: 17 U/L (ref 1–33)
AST SERPL-CCNC: 23 U/L (ref 1–32)
BACTERIA UR QL AUTO: ABNORMAL /HPF
BASOPHILS # BLD AUTO: 0.06 10*3/MM3 (ref 0–0.2)
BASOPHILS NFR BLD AUTO: 0.9 % (ref 0–1.5)
BILIRUB SERPL-MCNC: 0.6 MG/DL (ref 0.2–1.2)
BILIRUB UR QL STRIP: NEGATIVE
BUN SERPL-MCNC: 24 MG/DL (ref 8–23)
BUN/CREAT SERPL: 25 (ref 7–25)
CALCIUM SERPL-MCNC: 11.2 MG/DL (ref 8.6–10.5)
CHLORIDE SERPL-SCNC: 101 MMOL/L (ref 98–107)
CLARITY UR: CLEAR
CO2 SERPL-SCNC: 29.5 MMOL/L (ref 22–29)
COLOR UR: YELLOW
CREAT SERPL-MCNC: 0.96 MG/DL (ref 0.57–1)
DEPRECATED RDW RBC AUTO: 45.8 FL (ref 37–54)
EOSINOPHIL # BLD AUTO: 0.28 10*3/MM3 (ref 0–0.4)
EOSINOPHIL NFR BLD AUTO: 4.2 % (ref 0.3–6.2)
ERYTHROCYTE [DISTWIDTH] IN BLOOD BY AUTOMATED COUNT: 12.8 % (ref 12.3–15.4)
GLOBULIN SER CALC-MCNC: 2.8 GM/DL
GLUCOSE SERPL-MCNC: 96 MG/DL (ref 65–99)
GLUCOSE UR STRIP-MCNC: NEGATIVE MG/DL
HCT VFR BLD AUTO: 41 % (ref 34–46.6)
HGB BLD-MCNC: 13 G/DL (ref 12–15.9)
HGB UR QL STRIP.AUTO: NEGATIVE
HYALINE CASTS UR QL AUTO: ABNORMAL /LPF
KETONES UR QL STRIP: NEGATIVE
LEUKOCYTE ESTERASE UR QL STRIP.AUTO: ABNORMAL
LYMPHOCYTES # BLD AUTO: 1.95 10*3/MM3 (ref 0.7–3.1)
LYMPHOCYTES NFR BLD AUTO: 29.4 % (ref 19.6–45.3)
MCH RBC QN AUTO: 31.6 PG (ref 26.6–33)
MCHC RBC AUTO-ENTMCNC: 31.7 G/DL (ref 31.5–35.7)
MCV RBC AUTO: 99.8 FL (ref 79–97)
MONOCYTES # BLD AUTO: 0.7 10*3/MM3 (ref 0.1–0.9)
MONOCYTES NFR BLD AUTO: 10.5 % (ref 5–12)
MUCOUS THREADS URNS QL MICRO: ABNORMAL /HPF
NEUTROPHILS # BLD AUTO: 3.65 10*3/MM3 (ref 1.7–7)
NEUTROPHILS NFR BLD AUTO: 55 % (ref 42.7–76)
NITRITE UR QL STRIP: NEGATIVE
PH UR STRIP.AUTO: 7.5 [PH] (ref 5–8)
PLATELET # BLD AUTO: 277 10*3/MM3 (ref 140–450)
PMV BLD AUTO: 9.7 FL (ref 6–12)
POTASSIUM SERPL-SCNC: 5.5 MMOL/L (ref 3.5–5.2)
PROT SERPL-MCNC: 7.4 G/DL (ref 6–8.5)
PROT UR QL STRIP: NEGATIVE
RBC # BLD AUTO: 4.11 10*6/MM3 (ref 3.77–5.28)
RBC # UR: ABNORMAL /HPF
REF LAB TEST METHOD: ABNORMAL
RENAL EPI CELLS #/AREA URNS HPF: ABNORMAL /HPF
SODIUM SERPL-SCNC: 142 MMOL/L (ref 136–145)
SP GR UR STRIP: 1.01 (ref 1–1.03)
SQUAMOUS #/AREA URNS HPF: ABNORMAL /HPF
TRANS CELLS #/AREA URNS HPF: ABNORMAL /HPF
TSH SERPL-ACNC: 4.83 MIU/ML (ref 0.27–4.2)
UROBILINOGEN UR QL STRIP: ABNORMAL
WBC NRBC COR # BLD: 6.64 10*3/MM3 (ref 3.4–10.8)
WBC UR QL AUTO: ABNORMAL /HPF

## 2019-04-29 PROCEDURE — 99214 OFFICE O/P EST MOD 30 MIN: CPT | Performed by: INTERNAL MEDICINE

## 2019-04-29 PROCEDURE — 81001 URINALYSIS AUTO W/SCOPE: CPT | Performed by: INTERNAL MEDICINE

## 2019-04-29 PROCEDURE — 85025 COMPLETE CBC W/AUTO DIFF WBC: CPT | Performed by: INTERNAL MEDICINE

## 2019-04-29 PROCEDURE — 36415 COLL VENOUS BLD VENIPUNCTURE: CPT | Performed by: INTERNAL MEDICINE

## 2019-04-29 RX ORDER — CETIRIZINE HYDROCHLORIDE 10 MG/1
TABLET ORAL
Refills: 0 | COMMUNITY
Start: 2019-03-11 | End: 2019-10-21

## 2019-04-29 NOTE — PROGRESS NOTES
Subjective   Gladys Izquierdo is a 84 y.o. female.  Presents with a chief complaint of hypertension that is well controlled asthma that is well controlled currently degenerative joint disease of her back that is currently well controlled, psoriasis that is not acting up at this time status post cerebral aneurysm repair with no residual neurological effects, episodic dysuria and on her last labs she had an elevated TSH of 5.17 and upon questioning today reports that she has an increase in her generalized level of fatigue.  Although she is 84 years old she remains extremely active and fit.  We will recheck her TSH CMP and a urinalysis today.    History of Present Illness here for follow-up on her general medical issues and examination and review of her labs    The following portions of the patient's history were reviewed and updated as appropriate: allergies, current medications, past family history, past medical history, past social history, past surgical history and problem list.    Review of Systems   Constitutional: Positive for fatigue.   Genitourinary: Positive for dysuria.   Musculoskeletal: Positive for arthralgias and back pain.   All other systems reviewed and are negative.      Objective   Physical Exam   Constitutional: She appears well-developed and well-nourished.   HENT:   Head: Normocephalic and atraumatic.   Eyes: Pupils are equal, round, and reactive to light.   Neck: Normal range of motion.   Cardiovascular: Normal rate, regular rhythm and normal heart sounds.   Pulmonary/Chest: Effort normal and breath sounds normal.   Abdominal: Soft. Bowel sounds are normal.   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   Skin: Skin is warm.   Patient has scattered patches of psoriasis that are well circumcised and control   Psychiatric: She has a normal mood and affect.   Nursing note and vitals reviewed.        Assessment/Plan   Gladys was seen today for  annual exam.    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

## 2019-05-24 ENCOUNTER — TELEPHONE (OUTPATIENT)
Dept: INTERNAL MEDICINE | Facility: CLINIC | Age: 84
End: 2019-05-24

## 2019-05-24 DIAGNOSIS — E87.5 SERUM POTASSIUM ELEVATED: Primary | ICD-10-CM

## 2019-05-24 NOTE — TELEPHONE ENCOUNTER
----- Message from Estella Meza sent at 5/24/2019  9:42 AM EDT -----  Contact: Patient  Patient called inquiring about labs done in April. TSH 4.830 , abn cmp and UA   Any new meds needs to be on?   Please advise.    Patient:  [phone]

## 2019-05-28 NOTE — TELEPHONE ENCOUNTER
Due to the elevated potassium at her last lab I would like to recheck a CMP otherwise no changes at this time

## 2019-10-21 ENCOUNTER — OFFICE VISIT (OUTPATIENT)
Dept: INTERNAL MEDICINE | Facility: CLINIC | Age: 84
End: 2019-10-21

## 2019-10-21 VITALS
HEIGHT: 58 IN | WEIGHT: 141 LBS | BODY MASS INDEX: 29.6 KG/M2 | OXYGEN SATURATION: 97 % | SYSTOLIC BLOOD PRESSURE: 118 MMHG | HEART RATE: 90 BPM | DIASTOLIC BLOOD PRESSURE: 72 MMHG

## 2019-10-21 DIAGNOSIS — L40.9 PSORIASIS: Chronic | ICD-10-CM

## 2019-10-21 DIAGNOSIS — I10 ESSENTIAL HYPERTENSION: Primary | ICD-10-CM

## 2019-10-21 DIAGNOSIS — R79.89 ELEVATED TSH: ICD-10-CM

## 2019-10-21 DIAGNOSIS — Z23 NEED FOR INFLUENZA VACCINATION: ICD-10-CM

## 2019-10-21 DIAGNOSIS — M51.35 DDD (DEGENERATIVE DISC DISEASE), THORACOLUMBAR: ICD-10-CM

## 2019-10-21 LAB
ALBUMIN SERPL-MCNC: 4.8 G/DL (ref 3.5–5.2)
ALBUMIN/GLOB SERPL: 2 G/DL
ALP SERPL-CCNC: 71 U/L (ref 39–117)
ALT SERPL-CCNC: 13 U/L (ref 1–33)
AST SERPL-CCNC: 18 U/L (ref 1–32)
BASOPHILS # BLD AUTO: 0.06 10*3/MM3 (ref 0–0.2)
BASOPHILS NFR BLD AUTO: 1 % (ref 0–1.5)
BILIRUB SERPL-MCNC: 0.5 MG/DL (ref 0.2–1.2)
BUN SERPL-MCNC: 20 MG/DL (ref 8–23)
BUN/CREAT SERPL: 21.7 (ref 7–25)
CALCIUM SERPL-MCNC: 10.3 MG/DL (ref 8.6–10.5)
CHLORIDE SERPL-SCNC: 102 MMOL/L (ref 98–107)
CO2 SERPL-SCNC: 26.6 MMOL/L (ref 22–29)
CREAT SERPL-MCNC: 0.92 MG/DL (ref 0.57–1)
DEPRECATED RDW RBC AUTO: 44 FL (ref 37–54)
EOSINOPHIL # BLD AUTO: 0.38 10*3/MM3 (ref 0–0.4)
EOSINOPHIL NFR BLD AUTO: 6.1 % (ref 0.3–6.2)
ERYTHROCYTE [DISTWIDTH] IN BLOOD BY AUTOMATED COUNT: 12.3 % (ref 12.3–15.4)
GLOBULIN SER CALC-MCNC: 2.4 GM/DL
GLUCOSE SERPL-MCNC: 95 MG/DL (ref 65–99)
HCT VFR BLD AUTO: 40.9 % (ref 34–46.6)
HGB BLD-MCNC: 13.1 G/DL (ref 12–15.9)
LYMPHOCYTES # BLD AUTO: 1.87 10*3/MM3 (ref 0.7–3.1)
LYMPHOCYTES NFR BLD AUTO: 30 % (ref 19.6–45.3)
MCH RBC QN AUTO: 31.9 PG (ref 26.6–33)
MCHC RBC AUTO-ENTMCNC: 32 G/DL (ref 31.5–35.7)
MCV RBC AUTO: 99.5 FL (ref 79–97)
MONOCYTES # BLD AUTO: 0.83 10*3/MM3 (ref 0.1–0.9)
MONOCYTES NFR BLD AUTO: 13.3 % (ref 5–12)
NEUTROPHILS # BLD AUTO: 3.09 10*3/MM3 (ref 1.7–7)
NEUTROPHILS NFR BLD AUTO: 49.6 % (ref 42.7–76)
PLATELET # BLD AUTO: 308 10*3/MM3 (ref 140–450)
PMV BLD AUTO: 10.5 FL (ref 6–12)
POTASSIUM SERPL-SCNC: 5.3 MMOL/L (ref 3.5–5.2)
PROT SERPL-MCNC: 7.2 G/DL (ref 6–8.5)
RBC # BLD AUTO: 4.11 10*6/MM3 (ref 3.77–5.28)
SODIUM SERPL-SCNC: 142 MMOL/L (ref 136–145)
WBC NRBC COR # BLD: 6.23 10*3/MM3 (ref 3.4–10.8)

## 2019-10-21 PROCEDURE — 90653 IIV ADJUVANT VACCINE IM: CPT | Performed by: INTERNAL MEDICINE

## 2019-10-21 PROCEDURE — G0008 ADMIN INFLUENZA VIRUS VAC: HCPCS | Performed by: INTERNAL MEDICINE

## 2019-10-21 PROCEDURE — 99214 OFFICE O/P EST MOD 30 MIN: CPT | Performed by: INTERNAL MEDICINE

## 2019-10-21 PROCEDURE — 85025 COMPLETE CBC W/AUTO DIFF WBC: CPT | Performed by: INTERNAL MEDICINE

## 2019-10-21 PROCEDURE — 36415 COLL VENOUS BLD VENIPUNCTURE: CPT | Performed by: INTERNAL MEDICINE

## 2019-10-21 RX ORDER — BETAMETHASONE DIPROPIONATE 0.5 MG/G
OINTMENT TOPICAL
COMMUNITY

## 2019-10-21 NOTE — PROGRESS NOTES
Subjective   Gladys Izquierdo is a 84 y.o. female.  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    History of Present Illness the patient reports an overall improvement in her general health since she started taking CBD oil at night    The following portions of the patient's history were reviewed and updated as appropriate: allergies, current medications, past family history, past medical history, past social history, past surgical history and problem list.    Review of Systems   Constitutional: Positive for fatigue.   HENT: Negative.    Eyes: Negative.    Respiratory: Negative.    Cardiovascular: Negative.    Gastrointestinal: Negative.    Endocrine: Negative.    Genitourinary: Positive for frequency.   Musculoskeletal: Positive for arthralgias and back pain.   Allergic/Immunologic: Negative.    Neurological: Negative.    Hematological: Negative.    Psychiatric/Behavioral: Negative.        Objective   Physical Exam   Constitutional: She appears well-developed and well-nourished.   HENT:   Head: Normocephalic and atraumatic.   Eyes: Pupils are equal, round, and reactive to light.   Neck: Normal range of motion.   Cardiovascular: Normal rate, regular rhythm and normal heart sounds.   Pulmonary/Chest: Effort normal and breath sounds normal.   Abdominal: Soft. Bowel sounds are normal.   Musculoskeletal:   Diffuse arthritis particularly of the thoracic spine   Neurological: She is alert.   Skin: Skin is warm.   Psychiatric: She has a normal mood and affect.   Nursing note and vitals reviewed.        Assessment/Plan   Gladys was seen today for hypertension.    Diagnoses and all orders for this visit:    Essential hypertension  Comments:  well controlled  Orders:  -     Comprehensive  metabolic panel; Future  -     CBC w AUTO Differential; Future    Psoriasis  Comments:  doing well     Elevated TSH  Comments:  will check a TSH at her next visit    DDD (degenerative disc disease), thoracolumbar  Comments:  stable for now    Need for influenza vaccination  -     Fluad Quad >65 years ([id number])

## 2019-12-02 ENCOUNTER — TRANSCRIBE ORDERS (OUTPATIENT)
Dept: ADMINISTRATIVE | Facility: HOSPITAL | Age: 84
End: 2019-12-02

## 2019-12-02 DIAGNOSIS — Z12.31 SCREENING MAMMOGRAM, ENCOUNTER FOR: Primary | ICD-10-CM

## 2019-12-06 ENCOUNTER — TELEPHONE (OUTPATIENT)
Dept: INTERNAL MEDICINE | Facility: CLINIC | Age: 84
End: 2019-12-06

## 2019-12-06 DIAGNOSIS — I10 ESSENTIAL HYPERTENSION: Primary | ICD-10-CM

## 2019-12-06 RX ORDER — LOSARTAN POTASSIUM 100 MG/1
100 TABLET ORAL DAILY
Qty: 90 TABLET | Refills: 3 | Status: SHIPPED | OUTPATIENT
Start: 2019-12-06 | End: 2020-04-23 | Stop reason: SDUPTHER

## 2019-12-06 NOTE — TELEPHONE ENCOUNTER
Patient was calling to inquire why her losartan (cozaar) 100 mg tablet hasn't been sent yet to the pharmacy patient is at the [facility] pharmacy [facility] and the pharmacist stated he can take a verbal order please call [phone]. Patient is leaving town and needs this filled today please call patient with any questions or concerns. The pharmacy needs a prescription with Dr Lackey name on it not her old doctor. Dr Honeycutt

## 2020-02-07 ENCOUNTER — HOSPITAL ENCOUNTER (OUTPATIENT)
Dept: MAMMOGRAPHY | Facility: HOSPITAL | Age: 85
Discharge: HOME | End: 2020-02-07
Admitting: INTERNAL MEDICINE

## 2020-02-07 DIAGNOSIS — Z12.31 SCREENING MAMMOGRAM, ENCOUNTER FOR: ICD-10-CM

## 2020-02-07 PROCEDURE — 77067 SCR MAMMO BI INCL CAD: CPT

## 2020-02-07 PROCEDURE — 77063 BREAST TOMOSYNTHESIS BI: CPT

## 2020-03-31 RX ORDER — NYSTATIN 100000 U/G
1 CREAM TOPICAL DAILY
Qty: 15 G | Refills: 1 | Status: SHIPPED | OUTPATIENT
Start: 2020-03-31 | End: 2020-04-23 | Stop reason: SDUPTHER

## 2020-03-31 RX ORDER — NYSTATIN 100000 [USP'U]/G
POWDER TOPICAL DAILY
Qty: 1 EACH | Refills: 3 | Status: SHIPPED | OUTPATIENT
Start: 2020-03-31 | End: 2020-04-23 | Stop reason: SDUPTHER

## 2020-03-31 NOTE — TELEPHONE ENCOUNTER
Pt continues to swing her legs and right hip area over the siderails. marlyn soft restraints remain in place but pt wiggled hand out x4. continuous monitoring of pt maintained. Medications  Haldol x2 were ineffective. Pt alert and oriented x1. Alert to self.    Rx's sent to pharm

## 2020-03-31 NOTE — TELEPHONE ENCOUNTER
Patient is calling for an RX for the following medications:    nystatin (MYCOSTATIN) 199902 UNIT/GM powder    Nystatin Creme ( Not in list )    Patient has yeast underneath her breast area.    [facility] confirmed    Patient call back [phone]

## 2020-04-23 ENCOUNTER — OFFICE VISIT (OUTPATIENT)
Dept: INTERNAL MEDICINE | Facility: CLINIC | Age: 85
End: 2020-04-23

## 2020-04-23 DIAGNOSIS — B35.4 TINEA CORPORIS: ICD-10-CM

## 2020-04-23 DIAGNOSIS — I10 ESSENTIAL HYPERTENSION: Primary | Chronic | ICD-10-CM

## 2020-04-23 DIAGNOSIS — J45.20 MILD INTERMITTENT COLD-INDUCED ASTHMA WITHOUT COMPLICATION: Chronic | ICD-10-CM

## 2020-04-23 DIAGNOSIS — R79.89 ELEVATED TSH: ICD-10-CM

## 2020-04-23 DIAGNOSIS — L40.9 PSORIASIS: ICD-10-CM

## 2020-04-23 DIAGNOSIS — I10 ESSENTIAL HYPERTENSION: ICD-10-CM

## 2020-04-23 DIAGNOSIS — M51.35 DDD (DEGENERATIVE DISC DISEASE), THORACOLUMBAR: ICD-10-CM

## 2020-04-23 PROCEDURE — 99442 PR PHYS/QHP TELEPHONE EVALUATION 11-20 MIN: CPT | Performed by: INTERNAL MEDICINE

## 2020-04-23 RX ORDER — NYSTATIN 100000 U/G
1 CREAM TOPICAL DAILY
Qty: 30 G | Refills: 4 | Status: SHIPPED | OUTPATIENT
Start: 2020-04-23 | End: 2021-01-28 | Stop reason: SDUPTHER

## 2020-04-23 RX ORDER — NYSTATIN 100000 [USP'U]/G
POWDER TOPICAL DAILY
Qty: 60 G | Refills: 3 | Status: SHIPPED | OUTPATIENT
Start: 2020-04-23

## 2020-04-23 RX ORDER — LOSARTAN POTASSIUM 100 MG/1
100 TABLET ORAL DAILY
Qty: 90 TABLET | Refills: 3 | Status: SHIPPED | OUTPATIENT
Start: 2020-04-23

## 2020-04-23 NOTE — PROGRESS NOTES
Subjective   Gladys Izquierdo is a 85 y.o. female.  Patient presents by phone with a chief complaint of essential hypertension, mild intermittent asthma but has fortunately not flared up recently chronic psoriasis that responds well to clobetasol ointment, chronically elevated TSH without evidence of a hypothyroid issue, low back pain secondary to degenerative disc disease that stable as long as the patient does her stretching exercises and takes Tylenol at night, tinea corporis for which the patient uses nystatin powder and cream on a daily basis here for follow-up evaluation and treatment.  She is doing very well she has a daughter who does her shopping for her so she does not have to be exposed to the viral pandemic and overall has an excellent attitude about the whole thing.  I spent approximately 15 minutes with this patient.      There were no vitals taken for this visit.    There is no height or weight on file to calculate BMI.    History of Present Illness some difficulty with chronic arthritic pain but otherwise doing very well    The following portions of the patient's history were reviewed and updated as appropriate: allergies, current medications, past family history, past medical history, past social history, past surgical history and problem list.    Review of Systems   Constitutional: Positive for fatigue.   Respiratory: Negative.    Cardiovascular: Negative.    Gastrointestinal: Negative.    Musculoskeletal: Positive for arthralgias and back pain.   Neurological: Negative.    Psychiatric/Behavioral: Negative.        Objective   Physical Exam   Constitutional: She is oriented to person, place, and time. She appears well-developed and well-nourished.   Cardiovascular:   No reported cardiovascular issues   Pulmonary/Chest:   No reported pulmonary issues   Abdominal:   No reported abdominal issues   Musculoskeletal:   Diffuse arthropathy with chronic low back pain that responds well to stretching exercises  and Tylenol as needed   Neurological: She is alert and oriented to person, place, and time.   Psychiatric: She has a normal mood and affect. Her behavior is normal.   Mrs. Izquierdo is a wonderful lady with an excellent attitude and is a very kind person   Nursing note reviewed.        Assessment/Plan   Diagnoses and all orders for this visit:    Essential hypertension  Comments:  well controlled  Orders:  -     losartan (COZAAR) 100 MG tablet; Take 1 tablet by mouth Daily.    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    Tinea corporis  Comments:  The patient uses both nystatin powder and cream    Other orders  -     nystatin (MYCOSTATIN) 189136 UNIT/GM cream; Apply 1 application topically to the appropriate area as directed Daily.  -     nystatin (MYCOSTATIN) 642107 UNIT/GM powder; Apply  topically to the appropriate area as directed Daily.

## 2020-07-17 ENCOUNTER — TELEPHONE (OUTPATIENT)
Dept: INTERNAL MEDICINE | Facility: CLINIC | Age: 85
End: 2020-07-17

## 2020-07-17 NOTE — TELEPHONE ENCOUNTER
Patient has R.A. and is taking acetaminophen and ibuprofen. She states she is in a lot of pain due to her R.A. She states that due to Covid she heard not to take Ibuprofen. Would like advice on which to take, or if she should get a prescription for Mobic? She does not want a narcotic, but something stronger than an OTC med. If something can be called in, verified [facility] on [address].    Patient can be reached at [phone].    Patient would also like to say thank you for everything the office does.

## 2020-07-20 NOTE — TELEPHONE ENCOUNTER
Pt informed by detailed VM, advised her to call us back and tells us if she still takes naproxen or not, poss send her higher dose.

## 2020-07-20 NOTE — TELEPHONE ENCOUNTER
I have reviewed the patient's medications and I do not have a great alternative for her since she cannot take tramadol or any type of narcotic, she is supposed to be on Naprosyn please ask her if she is still taking medication

## 2020-07-21 RX ORDER — MELOXICAM 15 MG/1
15 TABLET ORAL DAILY
Qty: 90 TABLET | Refills: 3 | Status: SHIPPED | OUTPATIENT
Start: 2020-07-21

## 2020-07-22 ENCOUNTER — TELEPHONE (OUTPATIENT)
Dept: INTERNAL MEDICINE | Facility: CLINIC | Age: 85
End: 2020-07-22

## 2020-07-22 NOTE — TELEPHONE ENCOUNTER
Spoke to pt, was [facility]  Pt informed we sent to [facility] pharmacy , she was fine with that.

## 2020-07-22 NOTE — TELEPHONE ENCOUNTER
PT CALLED ASKING TO SPEAK WITH FRANCISCO JAVIER ABOUT HER MEDICATIONS. SHE SPOKE TO THE PHARMACY AND THEY DID NOT HAVE HER PRESCRIPTION FOR PAIN MEDICATION. SHE ALSO SAYS THE PHARMACIST HELPED HER FIGURE OUT AN ALTERNATIVE AND SHE MAY NOT NEED THIS PRESCRIPTION ANYWAY, BUT WOULD LIKE TO SPEAK WITH FRANCISCO JAVIER TO CLEAR EVERYTHING UP SINCE SHE TALKED TO HER YESTERDAY.    CALLBACK NUMBER: [phone]

## 2020-09-08 ENCOUNTER — TELEPHONE (OUTPATIENT)
Dept: INTERNAL MEDICINE | Facility: CLINIC | Age: 85
End: 2020-09-08

## 2020-09-08 RX ORDER — SULFAMETHOXAZOLE AND TRIMETHOPRIM 800; 160 MG/1; MG/1
1 TABLET ORAL 2 TIMES DAILY
Qty: 14 TABLET | Refills: 0 | Status: ON HOLD | OUTPATIENT
Start: 2020-09-08 | End: 2020-12-04

## 2020-09-08 NOTE — TELEPHONE ENCOUNTER
Caller: Gladys Izquierdo    Relationship: Self    Best call back number: [phone]    What medication are you requesting: NOT SPECIFIC     What are your current symptoms: FREQUENT URINATION, PAINFUL URINATION, CLOUDY URINATION     How long have you been experiencing symptoms: 3/4 DAYS     Have you had these symptoms before:    [x] Yes  [] No    Have you been treated for these symptoms before:   [x] Yes  [] No    If a prescription is needed, what is your preferred pharmacy and phone number: [address] [place] - [phone] [facility] [phone] FX

## 2020-10-13 RX ORDER — ALBUTEROL SULFATE 90 UG/1
AEROSOL, METERED RESPIRATORY (INHALATION)
Qty: 8.5 G | Refills: 2 | Status: SHIPPED | OUTPATIENT
Start: 2020-10-13 | End: 2020-12-21 | Stop reason: SDUPTHER

## 2020-10-13 NOTE — TELEPHONE ENCOUNTER
Caller: Gladys Izquierdo    Relationship: Self    Best call back number: [phone]    Medication needed:   Requested Prescriptions     Pending Prescriptions Disp Refills   • albuterol sulfate HFA (ProAir HFA) 108 (90 Base) MCG/ACT inhaler 8.5 g 2       When do you need the refill by: 10/13/2020    What details did the patient provide when requesting the medication:     Does the patient have less than a 3 day supply:  [x] Yes  [] No    What is the patient's preferred pharmacy: [address] [phone] [facility] [phone] FX

## 2020-12-04 ENCOUNTER — APPOINTMENT (OUTPATIENT)
Dept: CT IMAGING | Facility: HOSPITAL | Age: 85
End: 2020-12-04

## 2020-12-04 ENCOUNTER — HOSPITAL ENCOUNTER (OUTPATIENT)
Facility: HOSPITAL | Age: 85
Setting detail: OBSERVATION
Discharge: HOME | End: 2020-12-07
Attending: EMERGENCY MEDICINE | Admitting: INTERNAL MEDICINE

## 2020-12-04 ENCOUNTER — TELEPHONE (OUTPATIENT)
Dept: INTERNAL MEDICINE | Facility: CLINIC | Age: 85
End: 2020-12-04

## 2020-12-04 DIAGNOSIS — R41.89 COGNITIVE IMPAIRMENT: Primary | ICD-10-CM

## 2020-12-04 DIAGNOSIS — I63.9 CEREBROVASCULAR ACCIDENT (CVA), UNSPECIFIED MECHANISM (HCC): ICD-10-CM

## 2020-12-04 DIAGNOSIS — Z74.09 DECREASED MOBILITY AND ENDURANCE: ICD-10-CM

## 2020-12-04 LAB
ALBUMIN SERPL-MCNC: 4.8 G/DL (ref 3.5–5.2)
ALBUMIN/GLOB SERPL: 1.7 G/DL
ALP SERPL-CCNC: 72 U/L (ref 39–117)
ALT SERPL W P-5'-P-CCNC: 15 U/L (ref 1–33)
ANION GAP SERPL CALCULATED.3IONS-SCNC: 10.7 MMOL/L (ref 5–15)
AST SERPL-CCNC: 23 U/L (ref 1–32)
B PARAPERT DNA SPEC QL NAA+PROBE: NOT DETECTED
B PERT DNA SPEC QL NAA+PROBE: NOT DETECTED
BACTERIA UR QL AUTO: ABNORMAL /HPF
BASOPHILS # BLD AUTO: 0.08 10*3/MM3 (ref 0–0.2)
BASOPHILS NFR BLD AUTO: 0.9 % (ref 0–1.5)
BILIRUB SERPL-MCNC: 0.3 MG/DL (ref 0–1.2)
BILIRUB UR QL STRIP: NEGATIVE
BUN SERPL-MCNC: 27 MG/DL (ref 8–23)
BUN/CREAT SERPL: 30.3 (ref 7–25)
C PNEUM DNA NPH QL NAA+NON-PROBE: NOT DETECTED
CALCIUM SPEC-SCNC: 9.8 MG/DL (ref 8.6–10.5)
CHLORIDE SERPL-SCNC: 103 MMOL/L (ref 98–107)
CLARITY UR: CLEAR
CO2 SERPL-SCNC: 27.3 MMOL/L (ref 22–29)
COLOR UR: YELLOW
CREAT SERPL-MCNC: 0.89 MG/DL (ref 0.57–1)
DEPRECATED RDW RBC AUTO: 39.4 FL (ref 37–54)
EOSINOPHIL # BLD AUTO: 0.29 10*3/MM3 (ref 0–0.4)
EOSINOPHIL NFR BLD AUTO: 3.3 % (ref 0.3–6.2)
ERYTHROCYTE [DISTWIDTH] IN BLOOD BY AUTOMATED COUNT: 11.6 % (ref 12.3–15.4)
FLUAV SUBTYP SPEC NAA+PROBE: NOT DETECTED
FLUBV RNA ISLT QL NAA+PROBE: NOT DETECTED
GFR SERPL CREATININE-BSD FRML MDRD: 60 ML/MIN/1.73
GLOBULIN UR ELPH-MCNC: 2.8 GM/DL
GLUCOSE SERPL-MCNC: 100 MG/DL (ref 65–99)
GLUCOSE UR STRIP-MCNC: NEGATIVE MG/DL
HADV DNA SPEC NAA+PROBE: NOT DETECTED
HCOV 229E RNA SPEC QL NAA+PROBE: NOT DETECTED
HCOV HKU1 RNA SPEC QL NAA+PROBE: NOT DETECTED
HCOV NL63 RNA SPEC QL NAA+PROBE: NOT DETECTED
HCOV OC43 RNA SPEC QL NAA+PROBE: NOT DETECTED
HCT VFR BLD AUTO: 37.6 % (ref 34–46.6)
HGB BLD-MCNC: 12.6 G/DL (ref 12–15.9)
HGB UR QL STRIP.AUTO: NEGATIVE
HMPV RNA NPH QL NAA+NON-PROBE: NOT DETECTED
HOLD SPECIMEN: NORMAL
HOLD SPECIMEN: NORMAL
HPIV1 RNA SPEC QL NAA+PROBE: NOT DETECTED
HPIV2 RNA SPEC QL NAA+PROBE: NOT DETECTED
HPIV3 RNA NPH QL NAA+PROBE: NOT DETECTED
HPIV4 P GENE NPH QL NAA+PROBE: NOT DETECTED
HYALINE CASTS UR QL AUTO: ABNORMAL /LPF
IMM GRANULOCYTES # BLD AUTO: 0.03 10*3/MM3 (ref 0–0.05)
IMM GRANULOCYTES NFR BLD AUTO: 0.3 % (ref 0–0.5)
INR PPP: 0.88 (ref 0.9–1.1)
KETONES UR QL STRIP: NEGATIVE
LEUKOCYTE ESTERASE UR QL STRIP.AUTO: ABNORMAL
LYMPHOCYTES # BLD AUTO: 2.26 10*3/MM3 (ref 0.7–3.1)
LYMPHOCYTES NFR BLD AUTO: 25.9 % (ref 19.6–45.3)
M PNEUMO IGG SER IA-ACNC: NOT DETECTED
MAGNESIUM SERPL-MCNC: 2.6 MG/DL (ref 1.6–2.4)
MCH RBC QN AUTO: 31.6 PG (ref 26.6–33)
MCHC RBC AUTO-ENTMCNC: 33.5 G/DL (ref 31.5–35.7)
MCV RBC AUTO: 94.2 FL (ref 79–97)
MONOCYTES # BLD AUTO: 0.79 10*3/MM3 (ref 0.1–0.9)
MONOCYTES NFR BLD AUTO: 9 % (ref 5–12)
NEUTROPHILS NFR BLD AUTO: 5.29 10*3/MM3 (ref 1.7–7)
NEUTROPHILS NFR BLD AUTO: 60.6 % (ref 42.7–76)
NITRITE UR QL STRIP: NEGATIVE
NRBC BLD AUTO-RTO: 0 /100 WBC (ref 0–0.2)
PH UR STRIP.AUTO: 8 [PH] (ref 5–8)
PLATELET # BLD AUTO: 291 10*3/MM3 (ref 140–450)
PMV BLD AUTO: 9.3 FL (ref 6–12)
POTASSIUM SERPL-SCNC: 4.3 MMOL/L (ref 3.5–5.2)
PROT SERPL-MCNC: 7.6 G/DL (ref 6–8.5)
PROT UR QL STRIP: NEGATIVE
PROTHROMBIN TIME: 11.7 SECONDS (ref 11.7–14.2)
QT INTERVAL: 335 MS
RBC # BLD AUTO: 3.99 10*6/MM3 (ref 3.77–5.28)
RBC # UR: ABNORMAL /HPF
REF LAB TEST METHOD: ABNORMAL
RHINOVIRUS RNA SPEC NAA+PROBE: NOT DETECTED
RSV RNA NPH QL NAA+NON-PROBE: NOT DETECTED
SARS-COV-2 RNA NPH QL NAA+NON-PROBE: NOT DETECTED
SODIUM SERPL-SCNC: 141 MMOL/L (ref 136–145)
SP GR UR STRIP: 1.02 (ref 1–1.03)
SQUAMOUS #/AREA URNS HPF: ABNORMAL /HPF
TROPONIN T SERPL-MCNC: <0.01 NG/ML (ref 0–0.03)
UROBILINOGEN UR QL STRIP: ABNORMAL
WBC # BLD AUTO: 8.74 10*3/MM3 (ref 3.4–10.8)
WBC UR QL AUTO: ABNORMAL /HPF
WHOLE BLOOD HOLD SPECIMEN: NORMAL
WHOLE BLOOD HOLD SPECIMEN: NORMAL

## 2020-12-04 PROCEDURE — 83735 ASSAY OF MAGNESIUM: CPT | Performed by: EMERGENCY MEDICINE

## 2020-12-04 PROCEDURE — G0378 HOSPITAL OBSERVATION PER HR: HCPCS

## 2020-12-04 PROCEDURE — 70450 CT HEAD/BRAIN W/O DYE: CPT

## 2020-12-04 PROCEDURE — 85025 COMPLETE CBC W/AUTO DIFF WBC: CPT | Performed by: NURSE PRACTITIONER

## 2020-12-04 PROCEDURE — 80053 COMPREHEN METABOLIC PANEL: CPT | Performed by: NURSE PRACTITIONER

## 2020-12-04 PROCEDURE — 81001 URINALYSIS AUTO W/SCOPE: CPT | Performed by: EMERGENCY MEDICINE

## 2020-12-04 PROCEDURE — 96361 HYDRATE IV INFUSION ADD-ON: CPT

## 2020-12-04 PROCEDURE — 93010 ELECTROCARDIOGRAM REPORT: CPT | Performed by: INTERNAL MEDICINE

## 2020-12-04 PROCEDURE — 82962 GLUCOSE BLOOD TEST: CPT

## 2020-12-04 PROCEDURE — 96374 THER/PROPH/DIAG INJ IV PUSH: CPT

## 2020-12-04 PROCEDURE — 99285 EMERGENCY DEPT VISIT HI MDM: CPT

## 2020-12-04 PROCEDURE — 84484 ASSAY OF TROPONIN QUANT: CPT | Performed by: EMERGENCY MEDICINE

## 2020-12-04 PROCEDURE — 93005 ELECTROCARDIOGRAM TRACING: CPT | Performed by: EMERGENCY MEDICINE

## 2020-12-04 PROCEDURE — 85610 PROTHROMBIN TIME: CPT | Performed by: EMERGENCY MEDICINE

## 2020-12-04 PROCEDURE — 36415 COLL VENOUS BLD VENIPUNCTURE: CPT

## 2020-12-04 PROCEDURE — 0202U NFCT DS 22 TRGT SARS-COV-2: CPT | Performed by: EMERGENCY MEDICINE

## 2020-12-04 RX ORDER — SODIUM CHLORIDE 0.9 % (FLUSH) 0.9 %
10 SYRINGE (ML) INJECTION AS NEEDED
Status: DISCONTINUED | OUTPATIENT
Start: 2020-12-04 | End: 2020-12-07 | Stop reason: HOSPADM

## 2020-12-04 RX ORDER — ASPIRIN 325 MG
325 TABLET ORAL DAILY
Status: DISCONTINUED | OUTPATIENT
Start: 2020-12-04 | End: 2020-12-07

## 2020-12-04 RX ORDER — ASPIRIN 300 MG/1
300 SUPPOSITORY RECTAL DAILY
Status: DISCONTINUED | OUTPATIENT
Start: 2020-12-04 | End: 2020-12-07

## 2020-12-04 RX ORDER — ASPIRIN 81 MG/1
324 TABLET, CHEWABLE ORAL ONCE
Status: COMPLETED | OUTPATIENT
Start: 2020-12-04 | End: 2020-12-04

## 2020-12-04 RX ORDER — SODIUM CHLORIDE 9 MG/ML
75 INJECTION, SOLUTION INTRAVENOUS CONTINUOUS
Status: DISCONTINUED | OUTPATIENT
Start: 2020-12-04 | End: 2020-12-07

## 2020-12-04 RX ORDER — LOSARTAN POTASSIUM 100 MG/1
100 TABLET ORAL DAILY
Status: DISCONTINUED | OUTPATIENT
Start: 2020-12-04 | End: 2020-12-07 | Stop reason: HOSPADM

## 2020-12-04 RX ORDER — SODIUM CHLORIDE 0.9 % (FLUSH) 0.9 %
10 SYRINGE (ML) INJECTION EVERY 12 HOURS SCHEDULED
Status: DISCONTINUED | OUTPATIENT
Start: 2020-12-04 | End: 2020-12-07 | Stop reason: HOSPADM

## 2020-12-04 RX ORDER — MULTIPLE VITAMINS W/ MINERALS TAB 9MG-400MCG
1 TAB ORAL DAILY
Status: DISCONTINUED | OUTPATIENT
Start: 2020-12-04 | End: 2020-12-07 | Stop reason: HOSPADM

## 2020-12-04 RX ORDER — LABETALOL HYDROCHLORIDE 5 MG/ML
10 INJECTION, SOLUTION INTRAVENOUS ONCE
Status: COMPLETED | OUTPATIENT
Start: 2020-12-04 | End: 2020-12-04

## 2020-12-04 RX ORDER — NYSTATIN 100000 [USP'U]/G
POWDER TOPICAL DAILY
Status: DISCONTINUED | OUTPATIENT
Start: 2020-12-04 | End: 2020-12-07 | Stop reason: HOSPADM

## 2020-12-04 RX ORDER — ALBUTEROL SULFATE 2.5 MG/3ML
2.5 SOLUTION RESPIRATORY (INHALATION) EVERY 6 HOURS PRN
Status: DISCONTINUED | OUTPATIENT
Start: 2020-12-04 | End: 2020-12-07 | Stop reason: HOSPADM

## 2020-12-04 RX ORDER — ONDANSETRON 2 MG/ML
4 INJECTION INTRAMUSCULAR; INTRAVENOUS EVERY 6 HOURS PRN
Status: DISCONTINUED | OUTPATIENT
Start: 2020-12-04 | End: 2020-12-07 | Stop reason: HOSPADM

## 2020-12-04 RX ORDER — ATORVASTATIN CALCIUM 80 MG/1
80 TABLET, FILM COATED ORAL NIGHTLY
Status: DISCONTINUED | OUTPATIENT
Start: 2020-12-04 | End: 2020-12-07

## 2020-12-04 RX ORDER — L.ACID,PARA/B.BIFIDUM/S.THERM 8B CELL
1 CAPSULE ORAL DAILY
Status: DISCONTINUED | OUTPATIENT
Start: 2020-12-04 | End: 2020-12-07 | Stop reason: HOSPADM

## 2020-12-04 RX ORDER — ALBUTEROL SULFATE 90 UG/1
2 AEROSOL, METERED RESPIRATORY (INHALATION) EVERY 6 HOURS PRN
Status: DISCONTINUED | OUTPATIENT
Start: 2020-12-04 | End: 2020-12-04 | Stop reason: CLARIF

## 2020-12-04 RX ADMIN — ATORVASTATIN CALCIUM 80 MG: 80 TABLET, FILM COATED ORAL at 21:45

## 2020-12-04 RX ADMIN — SODIUM CHLORIDE 75 ML/HR: 9 INJECTION, SOLUTION INTRAVENOUS at 21:45

## 2020-12-04 RX ADMIN — SODIUM CHLORIDE, PRESERVATIVE FREE 10 ML: 5 INJECTION INTRAVENOUS at 21:45

## 2020-12-04 RX ADMIN — LABETALOL HYDROCHLORIDE 10 MG: 5 INJECTION, SOLUTION INTRAVENOUS at 16:05

## 2020-12-04 RX ADMIN — ASPIRIN 324 MG: 81 TABLET, CHEWABLE ORAL at 17:52

## 2020-12-04 NOTE — ED NOTES
Pt's purse carried by this RN to triage and given to daughter Jacklyn, pt kept her cell phone and ID, no other items removed from purse by patient     Deb Zapata RN  12/04/20 9275

## 2020-12-04 NOTE — TELEPHONE ENCOUNTER
Patient daughter has taken her to an urgent care and patient is negative for covid and UTI, but concerns of possible brain bleed advised to take to ER

## 2020-12-04 NOTE — TELEPHONE ENCOUNTER
THE PATIENT DAUGHTER CALLED IN AND STATED THAT SHE WAS HAVING A HEADACHE. THE CLINIC RECOMMENDED THAT SHE GO GET A CT SCAN. THE DAUGHTER IS CONCERNED ABOUT THE PATIENT GOING TO THE EMERGENCY ROOM. SHE WANTED TO KNOW IF THERE IS SOMEWHERE ELSE THAT SHE CAN GET A CT SCAN DONE.    PLEASE ADVISE.    CALLBACK NUMBER [phone]

## 2020-12-04 NOTE — ED TRIAGE NOTES
.Pt masked on arrival, staff masked    Visitor reports pt with memory issues and headache that started a couple days ago, has had negative rapid covid and negative urine; pt reports the first night of headache on Wednesday night she was unable to use the tv remote control, also difficulty understanding how to use things, has been intermittent

## 2020-12-04 NOTE — ED PROVIDER NOTES
EMERGENCY DEPARTMENT ENCOUNTER    Room Number:  24/24  Date of encounter:  12/4/2020  PCP: Grupo Lackey MD  Historian: Patient and daughter      HPI:  Chief Complaint: Not acting right, headache, confusion, memory loss  A complete HPI/ROS/PMH/PSH/SH/FH are unobtainable due to: Patient is able to provide some history but she is little forgetful and spoke with daughter Katina which helps to provide more complete history  Context: Gladys Izquierdo is a 85 y.o. female who presents to the ED c/o started 2 nights ago on Tuesday night when she went to use the TV remote and she was unable to use it.  She did not know how to use the TV remote.  She needed her daughter for assistance.  Later that night she developed a headache that was in the right side of her headache.  It was not abrupt in onset or severe in onset.  She said that she has had a headache coming and going for the past 3 days.  It will occur in different parts of her head.  It sometimes will be of the right front sometimes the right back sometimes completely in the back and sometimes in the left side of her head.  Last time she had a headache was this morning and it was in the left side of her head.  Again it was not abrupt in onset or severe in onset.  It was mild and nagging.  Currently she is headache free.  She is unaware of any trauma to her head.  She states that she has been forgetful as well she states that it sometimes she feels that she misses words when she tries to speak and her speech gets jumbled up.  When I asked her how long that has been going on for she says is been going on for a while.  She says longer than just a few days.  Unable to give me an exact timeframe.  She reports no chest pain, fever, cough or cold.  She reports no weakness in arms or legs.  When I speak with Katina her daughter the symptoms have been persistent for approximately 2 days.  Katina states that she has problems thinking and communicating.  She mentions that she is  not able to use the TV remote control she also mentions that she was writing Silke cards and what she wrote down did not make sense it was not what she wanted to write down.  She also mentioned that she is having trouble operating her phone and trouble communicating.  Katina states this is just been going on for the past 2 to 3 days.  She went to an urgent care center and had a rapid Covid test that was negative today and also had a urinalysis that was unremarkable.  They mentioned that she needs to come to the emergency department for further evaluation.        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   • DDD (degenerative disc disease), thoracolumbar 08/04/2016   • Psoriasis 10/21/2016   • H/O cerebral aneurysm repair 10/31/2018   • Elevated TSH 04/29/2019   • Dysuria 04/29/2019     Resolved Ambulatory Problems     Diagnosis Date Noted   • No Resolved Ambulatory Problems     No Additional Past Medical History         PAST SURGICAL HISTORY  Past Surgical History:   Procedure Laterality Date   • CEREBRAL ANEURYSM REPAIR  2000   • CHOLECYSTECTOMY  2005   • HYSTERECTOMY      with bladder repair   • OTHER SURGICAL HISTORY      Repair 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 stages, and abdominal hernia repair   • TONSILLECTOMY      age 7         FAMILY HISTORY  Family History   Problem Relation Age of Onset   • No Known Problems Brother    • No Known Problems Daughter    • No Known Problems Son    • No Known Problems Daughter          SOCIAL HISTORY  Social History     Socioeconomic History   • Marital status:      Spouse name: Not on file   • Number of children: Not on file   • Years of education: Not on file   • Highest education level: Not on file   Tobacco  Use   • Smoking status: Former Smoker     Years: 28.00     Types: Cigarettes     Start date:      Quit date:      Years since quittin.9   • Smokeless tobacco: Never Used   • Tobacco comment: less than 1 pack per day for 28 years    Substance and Sexual Activity   • Alcohol use: No   • Drug use: No         ALLERGIES  Demerol [meperidine], Latex, Morphine and related, Dilaudid [hydromorphone hcl], and Levofloxacin        REVIEW OF SYSTEMS  Review of Systems     All systems reviewed and negative except for those discussed in HPI.       PHYSICAL EXAM    I have reviewed the triage vital signs and nursing notes.    ED Triage Vitals [20 1517]   Temp Heart Rate Resp BP SpO2   96.1 °F (35.6 °C) 104 18 -- 97 %      Temp src Heart Rate Source Patient Position BP Location FiO2 (%)   -- -- -- -- --       GENERAL: Elderly female no acute distress.Vital signs on my initial evaluation patient has high blood pressure with a heart rate 208/115.  O2 sats 100% on room air and is afebrile.  HENT: nares patent  Head/neck/ face are symmetric without gross deformity, signs of trauma, or swelling  EYES: no scleral icterus, no conjunctival pallor.  NECK: Supple, no meningismus  CV: regular rhythm, regular rate with intact distal pulses.  No murmur rub  RESPIRATORY: normal effort and no respiratory distress.  There to auscultation bilaterally  ABDOMEN: soft and non-tender.  MUSCULOSKELETAL: no deformity.  No edema.  Intact distal pulses to upper and lower extremities that are equal and symmetric.  NEURO: Recent and remote memory functions are normal. The patient is attentive with normal concentration. Language is fluent. Speech is clear. The speech is non-dysarthric.  Patient is forgetful and does not remember recent events.  She also seems to become confused easily.  Symmetric smile with no facial droop.  Eyes close shut strongly bilaterally.  Symmetric eyebrow raise bilaterally.  EOMI, PERRL  CN II-XII grossly normal  otherwise.  5/5 strength to extremities.No sensory changes to face or extremities. No focal weakness.  No pronator drift.  Intact FNF. Normal gait  No meningismus.     SKIN: warm, dry    Vital signs and nursing notes reviewed.        LAB RESULTS  Recent Results (from the past 24 hour(s))   Comprehensive Metabolic Panel    Collection Time: 12/04/20  3:47 PM    Specimen: Blood   Result Value Ref Range    Glucose 100 (H) 65 - 99 mg/dL    BUN 27 (H) 8 - 23 mg/dL    Creatinine 0.89 0.57 - 1.00 mg/dL    Sodium 141 136 - 145 mmol/L    Potassium 4.3 3.5 - 5.2 mmol/L    Chloride 103 98 - 107 mmol/L    CO2 27.3 22.0 - 29.0 mmol/L    Calcium 9.8 8.6 - 10.5 mg/dL    Total Protein 7.6 6.0 - 8.5 g/dL    Albumin 4.80 3.50 - 5.20 g/dL    ALT (SGPT) 15 1 - 33 U/L    AST (SGOT) 23 1 - 32 U/L    Alkaline Phosphatase 72 39 - 117 U/L    Total Bilirubin 0.3 0.0 - 1.2 mg/dL    eGFR Non African Amer 60 (L) >60 mL/min/1.73    Globulin 2.8 gm/dL    A/G Ratio 1.7 g/dL    BUN/Creatinine Ratio 30.3 (H) 7.0 - 25.0    Anion Gap 10.7 5.0 - 15.0 mmol/L   CBC Auto Differential    Collection Time: 12/04/20  3:47 PM    Specimen: Blood   Result Value Ref Range    WBC 8.74 3.40 - 10.80 10*3/mm3    RBC 3.99 3.77 - 5.28 10*6/mm3    Hemoglobin 12.6 12.0 - 15.9 g/dL    Hematocrit 37.6 34.0 - 46.6 %    MCV 94.2 79.0 - 97.0 fL    MCH 31.6 26.6 - 33.0 pg    MCHC 33.5 31.5 - 35.7 g/dL    RDW 11.6 (L) 12.3 - 15.4 %    RDW-SD 39.4 37.0 - 54.0 fl    MPV 9.3 6.0 - 12.0 fL    Platelets 291 140 - 450 10*3/mm3    Neutrophil % 60.6 42.7 - 76.0 %    Lymphocyte % 25.9 19.6 - 45.3 %    Monocyte % 9.0 5.0 - 12.0 %    Eosinophil % 3.3 0.3 - 6.2 %    Basophil % 0.9 0.0 - 1.5 %    Immature Grans % 0.3 0.0 - 0.5 %    Neutrophils, Absolute 5.29 1.70 - 7.00 10*3/mm3    Lymphocytes, Absolute 2.26 0.70 - 3.10 10*3/mm3    Monocytes, Absolute 0.79 0.10 - 0.90 10*3/mm3    Eosinophils, Absolute 0.29 0.00 - 0.40 10*3/mm3    Basophils, Absolute 0.08 0.00 - 0.20 10*3/mm3    Immature  Grans, Absolute 0.03 0.00 - 0.05 10*3/mm3    nRBC 0.0 0.0 - 0.2 /100 WBC   Light Blue Top    Collection Time: 12/04/20  3:47 PM   Result Value Ref Range    Extra Tube hold for add-on    Green Top (Gel)    Collection Time: 12/04/20  3:47 PM   Result Value Ref Range    Extra Tube Hold for add-ons.    Lavender Top    Collection Time: 12/04/20  3:47 PM   Result Value Ref Range    Extra Tube hold for add-on    Gold Top - SST    Collection Time: 12/04/20  3:47 PM   Result Value Ref Range    Extra Tube Hold for add-ons.    Protime-INR    Collection Time: 12/04/20  3:47 PM    Specimen: Blood   Result Value Ref Range    Protime 11.7 11.7 - 14.2 Seconds    INR 0.88 (L) 0.90 - 1.10   Troponin    Collection Time: 12/04/20  3:47 PM    Specimen: Blood   Result Value Ref Range    Troponin T <0.010 0.000 - 0.030 ng/mL   Magnesium    Collection Time: 12/04/20  3:47 PM    Specimen: Blood   Result Value Ref Range    Magnesium 2.6 (H) 1.6 - 2.4 mg/dL   Urinalysis With Microscopic If Indicated (No Culture) - Urine, Clean Catch    Collection Time: 12/04/20  3:47 PM    Specimen: Urine, Clean Catch   Result Value Ref Range    Color, UA Yellow Yellow, Straw    Appearance, UA Clear Clear    pH, UA 8.0 5.0 - 8.0    Specific Gravity, UA 1.017 1.005 - 1.030    Glucose, UA Negative Negative    Ketones, UA Negative Negative    Bilirubin, UA Negative Negative    Blood, UA Negative Negative    Protein, UA Negative Negative    Leuk Esterase, UA Trace (A) Negative    Nitrite, UA Negative Negative    Urobilinogen, UA 0.2 E.U./dL 0.2 - 1.0 E.U./dL   Urinalysis, Microscopic Only - Urine, Clean Catch    Collection Time: 12/04/20  3:47 PM    Specimen: Urine, Clean Catch   Result Value Ref Range    RBC, UA 0-2 None Seen, 0-2 /HPF    WBC, UA 3-5 (A) None Seen, 0-2 /HPF    Bacteria, UA None Seen None Seen /HPF    Squamous Epithelial Cells, UA 0-2 None Seen, 0-2 /HPF    Hyaline Casts, UA 0-2 None Seen /LPF    Methodology Automated Microscopy    Respiratory  Panel PCR w/COVID-19(SARS-CoV-2) JESSICA/SHAWANDA/AGUILA/PAD/COR/MAD/MIKE In-House, NP Swab in UTM/VTM, 3-4 HR TAT - Swab, Nasopharynx    Collection Time: 12/04/20  4:01 PM    Specimen: Nasopharynx; Swab   Result Value Ref Range    ADENOVIRUS, PCR Not Detected Not Detected    Coronavirus 229E Not Detected Not Detected    Coronavirus HKU1 Not Detected Not Detected    Coronavirus NL63 Not Detected Not Detected    Coronavirus OC43 Not Detected Not Detected    COVID19 Not Detected Not Detected - Ref. Range    Human Metapneumovirus Not Detected Not Detected    Human Rhinovirus/Enterovirus Not Detected Not Detected    Influenza A PCR Not Detected Not Detected    Influenza B PCR Not Detected Not Detected    Parainfluenza Virus 1 Not Detected Not Detected    Parainfluenza Virus 2 Not Detected Not Detected    Parainfluenza Virus 3 Not Detected Not Detected    Parainfluenza Virus 4 Not Detected Not Detected    RSV, PCR Not Detected Not Detected    Bordetella pertussis pcr Not Detected Not Detected    Bordetella parapertussis PCR Not Detected Not Detected    Chlamydophila pneumoniae PCR Not Detected Not Detected    Mycoplasma pneumo by PCR Not Detected Not Detected   ECG 12 Lead    Collection Time: 12/04/20  4:05 PM   Result Value Ref Range    QT Interval 335 ms       Ordered the above labs and independently reviewed the results.        RADIOLOGY  Ct Head Without Contrast    Result Date: 12/4/2020  CT OF THE BRAIN WITHOUT CONTRAST 12/04/2020  HISTORY: Headache. Confusion.  Axial images were obtained through the brain without intravenous contrast. There is mild diffuse atrophy. There is mild decreased attenuation of the periventricular white matter bilaterally consistent with mild small vessel white matter ischemic disease.  There is no evidence of acute infarction, hemorrhage, midline shift or mass effect. There is evidence of previous left frontotemporal craniotomy. Vascular clip is seen in the left suprasellar region.  No other bony  abnormalities are seen.      1.  Evidence of previous left frontotemporal craniotomy and apparent aneurysm clipping. 2.  No acute process identified.  Radiation dose reduction techniques were utilized, including automated exposure control and exposure modulation based on body size.         I ordered the above noted radiological studies. Reviewed by me and discussed with radiologist.  See dictation for official radiology interpretation.      PROCEDURES    Procedures      MEDICATIONS GIVEN IN ER    Medications   sodium chloride 0.9 % flush 10 mL (has no administration in time range)   labetalol (NORMODYNE,TRANDATE) injection 10 mg (10 mg Intravenous Given 12/4/20 1605)   aspirin chewable tablet 324 mg (324 mg Oral Given 12/4/20 1752)         PROGRESS, DATA ANALYSIS, CONSULTS, AND MEDICAL DECISION MAKING    I spoke with the patient as well as with the daughter.  I will give the patient something for her elevated blood pressure and we will check it again.  I am more concerned that this potentially could be a stroke.  Could be also related to her extreme elevation of blood pressure.  She is very stable.  I communicated this with her daughter will go ahead and check lab work and a CT scan of her head.  All questions answered at this time  We are currently under a pandemic from the COVID19 infection.  The patient presented to the emergency department by ambulance or personal vehicle. I followed the current protocols required by Infection Control at [facility] in my evaluation and treatment of the patient. The patient was wearing a face mask during my evaluation and throughout my encounter. During my whole encounter with this patient I used appropriate personal protective equipment.  This equipment consisted of eye protection, facemask, gown, and gloves.  I applied this equipment before entering the room.      All labs have been independently reviewed by me.  All radiology studies have been reviewed by me and  discussed with radiologist dictating the report.   EKG's independently viewed and interpreted by me.  Discussion below represents my analysis of pertinent findings related to patient's condition, differential diagnosis, treatment plan and final disposition.      ED Course as of Dec 04 1832   Fri Dec 04, 2020   1559 This patient is not a TPA candidate given the onset of symptoms and given her neurologic exam here.  I do not appreciate any definitive neurologic abnormality in the RHONDA stroke scale.  That is 0.  But she also has some obvious impairment in her cognitive ability and forgetfulness and especially talking with her daughter.    [MM]   1655 EKG readingEKG was done at 1605It is a rate of 113 and it is a sinus tachycardiaThere is intraventricular conduction delay with left axis deviation.  There is signs of LVH there is diffuse nonspecific ST and T wave changes.  QT looks okayCompared to the previous EKG no significant change in the QRS she does have tachycardia.  That previous EKG was done on September 29, 2015.    [MM]   1736 CT scan of the head showed no acute process.    [MM]   1738 Patient's blood pressure is 164/91 and heart rate is in the 80s.  Patient's neurologic status is remained unchanged.  When I actually speak with her couple times I think she gets garbled on some words and gets mixed up.  Patient states it is the mass that she has on.  She feels that she is speaking her normal self.  I am concerned that this patient has had a stroke.  It seems like all of this started 2 days ago according to the daughter and since that time it is been constant and she has had these abnormalities.  We will go ahead admit her to the hospital and give her an aspirin here.  All questions answered.    [MM]   1750 I spoke with Dr. Harris for [facility].  He agrees to admit this patient.  Informed him of my concerns initial results of test and my initial treatment plan.    [MM]      ED Course User Index  [MM] Tom Michelle MD        AS OF 18:32 EST VITALS:    BP - (!) 141/104  HR - 80  TEMP - 96.1 °F (35.6 °C)  02 SATS - 94%        DIAGNOSIS  Final diagnoses:   Cognitive impairment   Cerebrovascular accident (CVA), unspecified mechanism (CMS/HCC)         DISPOSITION  I have reviewed the test results with my patient and explained the current treatment plan.  I answered all of the patient's questions.  The patient will be admitted to monitor bed at this time.  The patient is not hypotensive and is tolerating their current disease condition well enough for a monitored bed at this time.  The patient's current condition does not require intensive care treatment at this time.             Tom Michelle MD  12/04/20 4951

## 2020-12-05 ENCOUNTER — APPOINTMENT (OUTPATIENT)
Dept: CARDIOLOGY | Facility: HOSPITAL | Age: 85
End: 2020-12-05

## 2020-12-05 PROBLEM — R47.01 APHASIA: Status: ACTIVE | Noted: 2020-12-05

## 2020-12-05 LAB
ALBUMIN SERPL-MCNC: 4 G/DL (ref 3.5–5.2)
ALBUMIN/GLOB SERPL: 1.6 G/DL
ALP SERPL-CCNC: 63 U/L (ref 39–117)
ALT SERPL W P-5'-P-CCNC: 14 U/L (ref 1–33)
ANION GAP SERPL CALCULATED.3IONS-SCNC: 8.1 MMOL/L (ref 5–15)
AORTIC DIMENSIONLESS INDEX: 1.1 (DI)
AST SERPL-CCNC: 24 U/L (ref 1–32)
BH CV ECHO MEAS - ACS: 1.9 CM
BH CV ECHO MEAS - AO MAX PG (FULL): -0.74 MMHG
BH CV ECHO MEAS - AO MAX PG: 9.2 MMHG
BH CV ECHO MEAS - AO MEAN PG (FULL): 0 MMHG
BH CV ECHO MEAS - AO MEAN PG: 5 MMHG
BH CV ECHO MEAS - AO ROOT AREA (BSA CORRECTED): 1.8
BH CV ECHO MEAS - AO ROOT AREA: 7.5 CM^2
BH CV ECHO MEAS - AO ROOT DIAM: 3.1 CM
BH CV ECHO MEAS - AO V2 MAX: 152 CM/SEC
BH CV ECHO MEAS - AO V2 MEAN: 98.7 CM/SEC
BH CV ECHO MEAS - AO V2 VTI: 24.9 CM
BH CV ECHO MEAS - AVA(I,A): 2.1 CM^2
BH CV ECHO MEAS - AVA(I,D): 2.1 CM^2
BH CV ECHO MEAS - AVA(V,A): 2.1 CM^2
BH CV ECHO MEAS - AVA(V,D): 2.1 CM^2
BH CV ECHO MEAS - BSA(HAYCOCK): 1.8 M^2
BH CV ECHO MEAS - BSA: 1.7 M^2
BH CV ECHO MEAS - BZI_BMI: 29.3 KILOGRAMS/M^2
BH CV ECHO MEAS - BZI_METRIC_HEIGHT: 154.9 CM
BH CV ECHO MEAS - BZI_METRIC_WEIGHT: 70.3 KG
BH CV ECHO MEAS - EDV(CUBED): 42.9 ML
BH CV ECHO MEAS - EDV(MOD-SP2): 38 ML
BH CV ECHO MEAS - EDV(MOD-SP4): 35 ML
BH CV ECHO MEAS - EDV(TEICH): 50.9 ML
BH CV ECHO MEAS - EF(CUBED): 81.3 %
BH CV ECHO MEAS - EF(MOD-BP): 58.2 %
BH CV ECHO MEAS - EF(MOD-SP2): 63.2 %
BH CV ECHO MEAS - EF(MOD-SP4): 54.3 %
BH CV ECHO MEAS - EF(TEICH): 75 %
BH CV ECHO MEAS - ESV(CUBED): 8 ML
BH CV ECHO MEAS - ESV(MOD-SP2): 14 ML
BH CV ECHO MEAS - ESV(MOD-SP4): 16 ML
BH CV ECHO MEAS - ESV(TEICH): 12.7 ML
BH CV ECHO MEAS - FS: 42.9 %
BH CV ECHO MEAS - IVS/LVPW: 1.2
BH CV ECHO MEAS - IVSD: 1.2 CM
BH CV ECHO MEAS - LAT PEAK E' VEL: 12.5 CM/SEC
BH CV ECHO MEAS - LV DIASTOLIC VOL/BSA (35-75): 20.6 ML/M^2
BH CV ECHO MEAS - LV MASS(C)D: 119 GRAMS
BH CV ECHO MEAS - LV MASS(C)DI: 70.2 GRAMS/M^2
BH CV ECHO MEAS - LV MAX PG: 10 MMHG
BH CV ECHO MEAS - LV MEAN PG: 5 MMHG
BH CV ECHO MEAS - LV SYSTOLIC VOL/BSA (12-30): 9.4 ML/M^2
BH CV ECHO MEAS - LV V1 MAX: 158 CM/SEC
BH CV ECHO MEAS - LV V1 MEAN: 99.9 CM/SEC
BH CV ECHO MEAS - LV V1 VTI: 26.4 CM
BH CV ECHO MEAS - LVIDD: 3.5 CM
BH CV ECHO MEAS - LVIDS: 2 CM
BH CV ECHO MEAS - LVLD AP2: 5.9 CM
BH CV ECHO MEAS - LVLD AP4: 6.1 CM
BH CV ECHO MEAS - LVLS AP2: 5.3 CM
BH CV ECHO MEAS - LVLS AP4: 5.2 CM
BH CV ECHO MEAS - LVOT AREA (M): 2 CM^2
BH CV ECHO MEAS - LVOT AREA: 2 CM^2
BH CV ECHO MEAS - LVOT DIAM: 1.6 CM
BH CV ECHO MEAS - LVPWD: 1 CM
BH CV ECHO MEAS - MED PEAK E' VEL: 9.7 CM/SEC
BH CV ECHO MEAS - MR MAX PG: 96.4 MMHG
BH CV ECHO MEAS - MR MAX VEL: 491 CM/SEC
BH CV ECHO MEAS - MV DEC SLOPE: 1271 CM/SEC^2
BH CV ECHO MEAS - MV DEC TIME: 151 SEC
BH CV ECHO MEAS - MV E MAX VEL: 164 CM/SEC
BH CV ECHO MEAS - MV MAX PG: 13.7 MMHG
BH CV ECHO MEAS - MV MEAN PG: 7 MMHG
BH CV ECHO MEAS - MV P1/2T MAX VEL: 208 CM/SEC
BH CV ECHO MEAS - MV P1/2T: 48 MSEC
BH CV ECHO MEAS - MV V2 MAX: 185 CM/SEC
BH CV ECHO MEAS - MV V2 MEAN: 126 CM/SEC
BH CV ECHO MEAS - MV V2 VTI: 24.6 CM
BH CV ECHO MEAS - MVA P1/2T LCG: 1.1 CM^2
BH CV ECHO MEAS - MVA(P1/2T): 4.6 CM^2
BH CV ECHO MEAS - MVA(VTI): 2.2 CM^2
BH CV ECHO MEAS - PA ACC TIME: 0.05 SEC
BH CV ECHO MEAS - PA MAX PG (FULL): 2.6 MMHG
BH CV ECHO MEAS - PA MAX PG: 5.2 MMHG
BH CV ECHO MEAS - PA PR(ACCEL): 58.8 MMHG
BH CV ECHO MEAS - PA V2 MAX: 114 CM/SEC
BH CV ECHO MEAS - PULM A REVS DUR: 0.08 SEC
BH CV ECHO MEAS - PULM A REVS VEL: 44.5 CM/SEC
BH CV ECHO MEAS - PULM DIAS VEL: 44 CM/SEC
BH CV ECHO MEAS - PULM S/D: 1.5
BH CV ECHO MEAS - PULM SYS VEL: 66.7 CM/SEC
BH CV ECHO MEAS - RAP SYSTOLE: 3 MMHG
BH CV ECHO MEAS - RV MAX PG: 2.6 MMHG
BH CV ECHO MEAS - RV MEAN PG: 1 MMHG
BH CV ECHO MEAS - RV V1 MAX: 81.3 CM/SEC
BH CV ECHO MEAS - RV V1 MEAN: 53.8 CM/SEC
BH CV ECHO MEAS - RV V1 VTI: 13.6 CM
BH CV ECHO MEAS - RVSP: 38 MMHG
BH CV ECHO MEAS - SI(AO): 110.9 ML/M^2
BH CV ECHO MEAS - SI(CUBED): 20.6 ML/M^2
BH CV ECHO MEAS - SI(LVOT): 31.3 ML/M^2
BH CV ECHO MEAS - SI(MOD-SP2): 14.2 ML/M^2
BH CV ECHO MEAS - SI(MOD-SP4): 11.2 ML/M^2
BH CV ECHO MEAS - SI(TEICH): 22.5 ML/M^2
BH CV ECHO MEAS - SV(AO): 187.9 ML
BH CV ECHO MEAS - SV(CUBED): 34.9 ML
BH CV ECHO MEAS - SV(LVOT): 53.1 ML
BH CV ECHO MEAS - SV(MOD-SP2): 24 ML
BH CV ECHO MEAS - SV(MOD-SP4): 19 ML
BH CV ECHO MEAS - SV(TEICH): 38.1 ML
BH CV ECHO MEAS - TAPSE (>1.6): 2 CM
BH CV ECHO MEAS - TR MAX PG: 35 MMHG
BH CV ECHO MEAS - TR MAX VEL: 295 CM/SEC
BH CV ECHO MEASUREMENTS AVERAGE E/E' RATIO: 14.77
BH CV XLRA - RV BASE: 3 CM
BH CV XLRA - RV LENGTH: 5.3 CM
BH CV XLRA - RV MID: 2.3 CM
BH CV XLRA - TDI S': 15.9 CM/SEC
BILIRUB SERPL-MCNC: 0.5 MG/DL (ref 0–1.2)
BUN SERPL-MCNC: 23 MG/DL (ref 8–23)
BUN/CREAT SERPL: 26.4 (ref 7–25)
CALCIUM SPEC-SCNC: 9 MG/DL (ref 8.6–10.5)
CHLORIDE SERPL-SCNC: 102 MMOL/L (ref 98–107)
CHOLEST SERPL-MCNC: 172 MG/DL (ref 0–200)
CO2 SERPL-SCNC: 25.9 MMOL/L (ref 22–29)
CREAT SERPL-MCNC: 0.87 MG/DL (ref 0.57–1)
DEPRECATED RDW RBC AUTO: 41.2 FL (ref 37–54)
ERYTHROCYTE [DISTWIDTH] IN BLOOD BY AUTOMATED COUNT: 11.7 % (ref 12.3–15.4)
GFR SERPL CREATININE-BSD FRML MDRD: 62 ML/MIN/1.73
GLOBULIN UR ELPH-MCNC: 2.5 GM/DL
GLUCOSE BLDC GLUCOMTR-MCNC: 100 MG/DL (ref 70–130)
GLUCOSE BLDC GLUCOMTR-MCNC: 96 MG/DL (ref 70–130)
GLUCOSE BLDC GLUCOMTR-MCNC: 96 MG/DL (ref 70–130)
GLUCOSE SERPL-MCNC: 92 MG/DL (ref 65–99)
HBA1C MFR BLD: 5.4 % (ref 4.8–5.6)
HCT VFR BLD AUTO: 32.7 % (ref 34–46.6)
HDLC SERPL-MCNC: 80 MG/DL (ref 40–60)
HGB BLD-MCNC: 10.7 G/DL (ref 12–15.9)
LDLC SERPL CALC-MCNC: 80 MG/DL (ref 0–100)
LDLC/HDLC SERPL: 1 {RATIO}
LEFT ATRIUM VOLUME INDEX: 14.2 ML/M2
LV EF 2D ECHO EST: 58 %
MAXIMAL PREDICTED HEART RATE: 136 BPM
MCH RBC QN AUTO: 31.8 PG (ref 26.6–33)
MCHC RBC AUTO-ENTMCNC: 32.7 G/DL (ref 31.5–35.7)
MCV RBC AUTO: 97 FL (ref 79–97)
PLATELET # BLD AUTO: 248 10*3/MM3 (ref 140–450)
PMV BLD AUTO: 10.2 FL (ref 6–12)
POTASSIUM SERPL-SCNC: 4.6 MMOL/L (ref 3.5–5.2)
PROT SERPL-MCNC: 6.5 G/DL (ref 6–8.5)
RBC # BLD AUTO: 3.37 10*6/MM3 (ref 3.77–5.28)
SODIUM SERPL-SCNC: 136 MMOL/L (ref 136–145)
STRESS TARGET HR: 116 BPM
TRIGL SERPL-MCNC: 62 MG/DL (ref 0–150)
VLDLC SERPL-MCNC: 12 MG/DL (ref 5–40)
WBC # BLD AUTO: 6.31 10*3/MM3 (ref 3.4–10.8)

## 2020-12-05 PROCEDURE — 93306 TTE W/DOPPLER COMPLETE: CPT

## 2020-12-05 PROCEDURE — 96361 HYDRATE IV INFUSION ADD-ON: CPT

## 2020-12-05 PROCEDURE — 82962 GLUCOSE BLOOD TEST: CPT

## 2020-12-05 PROCEDURE — 99203 OFFICE O/P NEW LOW 30 MIN: CPT | Performed by: PSYCHIATRY & NEUROLOGY

## 2020-12-05 PROCEDURE — 93306 TTE W/DOPPLER COMPLETE: CPT | Performed by: INTERNAL MEDICINE

## 2020-12-05 PROCEDURE — 83036 HEMOGLOBIN GLYCOSYLATED A1C: CPT | Performed by: INTERNAL MEDICINE

## 2020-12-05 PROCEDURE — G0378 HOSPITAL OBSERVATION PER HR: HCPCS

## 2020-12-05 PROCEDURE — 97165 OT EVAL LOW COMPLEX 30 MIN: CPT

## 2020-12-05 PROCEDURE — 97161 PT EVAL LOW COMPLEX 20 MIN: CPT

## 2020-12-05 PROCEDURE — 97116 GAIT TRAINING THERAPY: CPT

## 2020-12-05 PROCEDURE — 85027 COMPLETE CBC AUTOMATED: CPT | Performed by: INTERNAL MEDICINE

## 2020-12-05 PROCEDURE — 80053 COMPREHEN METABOLIC PANEL: CPT | Performed by: INTERNAL MEDICINE

## 2020-12-05 PROCEDURE — 80061 LIPID PANEL: CPT | Performed by: INTERNAL MEDICINE

## 2020-12-05 RX ORDER — ACETAMINOPHEN 325 MG/1
650 TABLET ORAL EVERY 4 HOURS PRN
Status: DISCONTINUED | OUTPATIENT
Start: 2020-12-05 | End: 2020-12-07 | Stop reason: HOSPADM

## 2020-12-05 RX ORDER — LABETALOL HYDROCHLORIDE 5 MG/ML
10 INJECTION, SOLUTION INTRAVENOUS EVERY 4 HOURS PRN
Status: DISCONTINUED | OUTPATIENT
Start: 2020-12-05 | End: 2020-12-07 | Stop reason: HOSPADM

## 2020-12-05 RX ADMIN — MULTIPLE VITAMINS W/ MINERALS TAB 1 TABLET: TAB at 08:32

## 2020-12-05 RX ADMIN — ACETAMINOPHEN 650 MG: 325 TABLET, FILM COATED ORAL at 03:07

## 2020-12-05 RX ADMIN — SODIUM CHLORIDE 75 ML/HR: 9 INJECTION, SOLUTION INTRAVENOUS at 20:25

## 2020-12-05 RX ADMIN — LOSARTAN POTASSIUM 100 MG: 100 TABLET, FILM COATED ORAL at 08:32

## 2020-12-05 RX ADMIN — SODIUM CHLORIDE, PRESERVATIVE FREE 10 ML: 5 INJECTION INTRAVENOUS at 08:33

## 2020-12-05 RX ADMIN — CALCIUM CARBONATE-VITAMIN D TAB 500 MG-200 UNIT 1000 MG: 500-200 TAB at 08:32

## 2020-12-05 RX ADMIN — ASPIRIN 325 MG: 325 TABLET ORAL at 08:32

## 2020-12-05 RX ADMIN — ATORVASTATIN CALCIUM 80 MG: 80 TABLET, FILM COATED ORAL at 20:25

## 2020-12-05 RX ADMIN — Medication 1 CAPSULE: at 08:32

## 2020-12-05 NOTE — PLAN OF CARE
Problem: Adult Inpatient Plan of Care  Goal: Plan of Care Review  Outcome: Ongoing, Progressing  Flowsheets (Taken 12/5/2020 0944)  Outcome Summary: RN   Goal Outcome Evaluation:  Plan of Care Reviewed With: patient  Progress: no change  Outcome Summary: RN refused bedside evaluation stating pt is tolerating a regular diet with thin liquids.

## 2020-12-05 NOTE — THERAPY EVALUATION
Acute Care - Physical Therapy Initial Evaluation  [facility]     Patient Name: Gladys Izquierdo  : 1935  MRN: [id number]  Today's Date: 2020   Onset of Illness/Injury or Date of Surgery: 20       PT Assessment (last 12 hours)      PT Evaluation and Treatment     Row Name 20 1548          Physical Therapy Time and Intention    Subjective Information  no complaints  -JR     Document Type  evaluation  -JR     Mode of Treatment  individual therapy  -JR     Total Minutes, Physical Therapy  15  -JR     Patient Effort  excellent  -JR     Comment  Pt DOES NOT NEED SKILLED PT AT THIS TIME AS SHE IS FUNCTIONING AT BASELINE.    -JR     Row Name 20 1548          General Information    Patient Profile Reviewed  yes  -JR     Onset of Illness/Injury or Date of Surgery  20  -JR     Referring Physician  James   -JR     Patient Observations  alert;cooperative;agree to therapy  -JR     General Observations of Patient  SITTING IN CHAIR, EAGER TO BE ALLOWED TO AMBULATE AROUND UNIT.    -JR     Prior Level of Function  independent:  -JR     Equipment Currently Used at Home  none  -JR     Pertinent History of Current Functional Problem  HAD CVA LIKE SYMPTOMS AT HOME PRIOR TO ADMIT.    -JR     Existing Precautions/Restrictions  no known precautions/restrictions  -JR     Barriers to Rehab  none identified  -JR     Row Name 20 1548          Previous Level of Function/Home Environm    BADLs, Premorbid Functional Level  independent  -JR     Row Name 20 1548          Living Environment    Current Living Arrangements  home/apartment/condo  -JR     Lives With  child(jass), adult  -JR     Row Name 20 1548          Home Main Entrance    Number of Stairs, Main Entrance  none  -JR     Row Name 20 1548          Home Use of Assistive/Adaptive Equipment    Equipment Currently Used at Home  none  -JR     Row Name 20 1548          Range of Motion Comprehensive    General Range of Motion   no range of motion deficits identified  -[facility] Name 12/05/20 1548          Strength Comprehensive (MMT)    General Manual Muscle Testing (MMT) Assessment  no strength deficits identified  -[facility] Name 12/05/20 1548          Bed Mobility    Comment (Bed Mobility)  NOT TESTED.  SITTING UP IN CHAIR.    -[facility] Name 12/05/20 1548          Transfers    Transfers  sit-stand transfer;stand-sit transfer  -     Sit-Stand [place] (Transfers)  independent  -[facility] Name 12/05/20 1548          Gait/Stairs (Locomotion)    [place] Level (Gait)  independent  -     Distance in Feet (Gait)  150  -     Comment (Gait/Stairs)  AMBULATES WITHOUT ANY APPARENT GAIT ABNORMALITIES.    -[facility] Name 12/05/20 1548          Balance    Static Sitting Balance  WFL  -     Static Standing Balance  WFL  -     Comment, Balance  ABLE TO SIDE STEP AND AMBULATE BACKWARDS WITH EYES OPEN AND EYES CLOSED WITHOUT LOSS OF BALANCE.    -[facility] Name 12/05/20 1548          Plan of Care Review    Plan of Care Reviewed With  patient  -JR     Row Name 12/05/20 1548          Positioning and Restraints    Pre-Treatment Position  sitting in chair/recliner  -     Post Treatment Position  chair  -JR     In Chair  notified nsg;sitting;call light within reach;encouraged to call for assist  -[facility] Name 12/05/20 1548          Therapy Assessment/Plan (PT)    Patient/Family Therapy Goals Statement (PT)  GO HOME  -     Functional Level at Time of Evaluation (PT)  INDEPENDENT.   -     Criteria for Skilled Interventions Met (PT)  no;does not meet criteria for skilled intervention  -     Comment, Therapy Assessment/Plan (PT)  PATIENT IS FUNCTIONING AT BASELINE LEVEL AND DOES NOT NEED SKILLED PT.    -[facility] Name 12/05/20 1548          PT Evaluation Complexity    History, PT Evaluation Complexity  no personal factors and/or comorbidities  -     Examination of Body Systems (PT Eval Complexity)  1-2 elements  -      Clinical Presentation (PT Evaluation Complexity)  stable  -JR     Clinical Decision Making (PT Evaluation Complexity)  low complexity  -JR     Overall Complexity (PT Evaluation Complexity)  low complexity  -JR       User Key  (r) = Recorded By, (t) = Taken By, (c) = Cosigned By    Initials Name Provider Type    Deon Gutierrez PT Physical Therapist        Physical Therapy Education                 Title: PT OT SLP Therapies (Done)     Topic: Physical Therapy (Done)     Point: Mobility training (Done)     Learning Progress Summary           Patient Jenny, SUREKHA,YAO, VU,DU by  at 12/5/2020 1554                   Point: Home exercise program (Done)     Learning Progress Summary           Patient Jenny, E,D, VU,DU by  at 12/5/2020 1554                   Point: Body mechanics (Done)     Learning Progress Summary           Patient SUREKHA Alvarado,YAO, BEV,DU by  at 12/5/2020 1554                   Point: Precautions (Done)     Learning Progress Summary           Patient SUREKHA Alvarado,YAO, VU,DU by  at 12/5/2020 1554                               User Key     Initials Effective Dates Name Provider Type [facility] 04/03/18 -  Deon Velazquez PT Physical Therapist PT              PT Recommendation and Plan  Anticipated Discharge Disposition (PT): home  Therapy Frequency (PT): evaluation only  Plan of Care Reviewed With: patient  Progress: improving  Outcome Summary: PATIENT IS FUNCTIONING AT HER CURRENT BASELINE LEVEL AND DOES NOT NEED SKILLED PT AT THIS TIME.  PLEASE D/C ORDER.  Outcome Measures     Row Name 12/05/20 1500             How much help from another person do you currently need...    Turning from your back to your side while in flat bed without using bedrails?  4  -JR      Moving from lying on back to sitting on the side of a flat bed without bedrails?  4  -JR      Moving to and from a bed to a chair (including a wheelchair)?  4  -JR      Standing up from a chair using your arms (e.g., wheelchair, bedside chair)?   4  -JR      Climbing 3-5 steps with a railing?  4  -JR      To walk in hospital room?  4  -JR      AM-PAC 6 Clicks Score (PT)  24  -JR        User Key  (r) = Recorded By, (t) = Taken By, (c) = Cosigned By    Initials Name Provider Type    Deon Gutierrez, PT Physical Therapist           Time Calculation:     Therapy Charges for Today     Code Description Service Date Service Provider Modifiers Qty    18044685319 HC PT EVAL LOW COMPLEXITY 1 12/5/2020 Deon Velazquez, PT GP 1    93944806917 HC GAIT TRAINING EA 15 MIN 12/5/2020 Deon Velazquez, PT GP 1          PT G-Codes  Outcome Measure Options: Modified Dougie  AM-PAC 6 Clicks Score (PT): 24  Modified Carver Scale: 1 - No significant disability despite symptoms.  Able to carry out all usual duties and activities.    Deon Velazquez, PT  12/5/2020

## 2020-12-05 NOTE — PLAN OF CARE
Problem: Adult Inpatient Plan of Care  Goal: Plan of Care Review  Flowsheets  Taken 12/5/2020 1554  Progress: improving  Plan of Care Reviewed With: patient  Outcome Summary: PATIENT IS FUNCTIONING AT HER CURRENT BASELINE LEVEL AND DOES NOT NEED SKILLED PT AT THIS TIME.  PLEASE D/C ORDER.  Taken 12/5/2020 1548  Plan of Care Reviewed With: patient   Goal Outcome Evaluation:  Plan of Care Reviewed With: patient  Progress: improving  Outcome Summary: PATIENT IS FUNCTIONING AT HER CURRENT BASELINE LEVEL AND DOES NOT NEED SKILLED PT AT THIS TIME.  PLEASE D/C ORDER.  Patient was intermittently wearing a face mask during this therapy encounter. Therapist used appropriate personal protective equipment including eye protection, mask, and gloves.  Mask used was standard procedure mask. Appropriate PPE was worn during the entire therapy session. Hand hygiene was completed before and after therapy session. Patient is not in enhanced droplet precautions.

## 2020-12-05 NOTE — THERAPY DISCHARGE NOTE
Acute Care - Occupational Therapy Evaluation and Discharge  [facility]    Patient Name: Gladys Izquierdo  : 1935    MRN: [id number]                              Today's Date: 2020       Admit Date: 2020    Visit Dx:     ICD-10-CM ICD-9-CM   1. Cognitive impairment  R41.89 294.9   2. Cerebrovascular accident (CVA), unspecified mechanism (CMS/HCC)  I63.9 434.91     Patient Active Problem List   Diagnosis   • Hypertension   • Colon polyps   • UTI (urinary tract infection)   • Asthma, cold induced   • DDD (degenerative disc disease), thoracolumbar   • Psoriasis   • H/O cerebral aneurysm repair   • Elevated TSH   • Dysuria   • Cognitive impairment     No past medical history on file.  Past Surgical History:   Procedure Laterality Date   • CEREBRAL ANEURYSM REPAIR     • CHOLECYSTECTOMY     • HYSTERECTOMY      with bladder repair   • OTHER SURGICAL HISTORY      Repair 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 stages, and abdominal hernia repair   • TONSILLECTOMY      age 7     General Information     Row Name 20 1232          OT Time and Intention    Document Type  evaluation  -EMILY     Mode of Treatment  occupational therapy  -EMILY     Row Name 20 1232          General Information    Patient Profile Reviewed  yes  -EMILY     Prior Level of Function  independent:;ADL's;community mobility  -EMILY     Existing Precautions/Restrictions  no known precautions/restrictions  -EMILY     Barriers to Rehab  none identified  -EMILY     Row Name 20 1232          Occupational Profile    Reason for Services/Referral (Occupational Profile)  Pt is an 86 y/o F presenting episodic/intermittent functional deficit associated with intermittent expressive aphasia with speech and writing abnormality. CT Head negative. NIH currently 0. Pending MRI Brain. PMH includes craniotomy, clipping intra cranial aneurysm . Pt resides in a condo with her daughter, 0 SHERICE. Pt reports indep with ADLs and mobility PTA. Pt is a retired  .  -     Row Name 12/05/20 1232          Living Environment    Lives With  child(jass), adult  -     Row Name 12/05/20 1232          Home Main Entrance    Number of Stairs, Main Entrance  none  -     Row Name 12/05/20 1232          Cognition    Orientation Status (Cognition)  oriented x 4  -       User Key  (r) = Recorded By, (t) = Taken By, (c) = Cosigned By    Initials Name Provider Type    Coco Cabrera OT Occupational Therapist        Mobility/ADL's     Row Name 12/05/20 1233          Bed Mobility    Comment (Bed Mobility)  NT; up in chair  -     Row Name 12/05/20 1233          Transfers    Transfers  sit-stand transfer  -     Sit-Stand [place] (Transfers)  independent  -     Row Name 12/05/20 1233          Functional Mobility    Functional Mobility- Ind. Level  independent  -     Functional Mobility-Distance (Feet)  3  -     Functional Mobility- Comment  Indep at baseline. No strength or sensation issues at this time to cause concern for acute needs.  -     Row Name 12/05/20 1233          Activities of Daily Living    BADL Assessment/Intervention  lower body dressing;feeding  -     Row Name 12/05/20 1233          Lower Body Dressing Assessment/Training    [place] Level (Lower Body Dressing)  don;socks;modified independence  -     Position (Lower Body Dressing)  supported sitting  -     Row Name 12/05/20 1233          Self-Feeding Assessment/Training    [place] Level (Feeding)  feeding skills;independent  -EMILY     Position (Self-Feeding)  supported sitting  -       User Key  (r) = Recorded By, (t) = Taken By, (c) = Cosigned By    Initials Name Provider Type    Coco Cabrera OT Occupational Therapist        Obj/Interventions     Row Name 12/05/20 1234          Sensory Assessment (Somatosensory)    Sensory Assessment (Somatosensory)  sensation intact  -     Row Name 12/05/20 1234          Vision Assessment/Intervention    Visual  Impairment/Limitations  WFL;corrective lenses full-time  -     Row Name 12/05/20 1234          Range of Motion Comprehensive    General Range of Motion  no range of motion deficits identified  -     Row Name 12/05/20 1234          Strength Comprehensive (MMT)    General Manual Muscle Testing (MMT) Assessment  no strength deficits identified  -     Row Name 12/05/20 1234          Balance    Balance Assessment  sitting static balance;standing static balance  -EMILY     Static Sitting Balance  WFL;unsupported;sitting in chair  -EMILY     Static Standing Balance  WFL;unsupported;standing  -MEILY       User Key  (r) = Recorded By, (t) = Taken By, (c) = Cosigned By    Initials Name Provider Type    Coco Cabrera, GEMINI Occupational Therapist        Goals/Plan    No documentation.       Clinical Impression     [facility] Name 12/05/20 1234          Plan of Care Review    Plan of Care Reviewed With  patient  -     Outcome Summary  Pt is an 86 y/o F presenting episodic/intermittent functional deficit associated with intermittent expressive aphasia with speech and writing abnormality. CT Head negative. NIH currently 0. Pending MRI Brain. PMH includes craniotomy, clipping intra cranial aneurysm 2000. Pt resides in a condo with her daughter, 0 SHERICE. Pt reports indep with ADLs and mobility PTA. Pt is a retired . Pt appears to have returned to baseline level of indep with self care and mobility. No acute OT needs at this time. OT will s/o.  -     Row Name 12/05/20 1234          Therapy Assessment/Plan (OT)    Criteria for Skilled Therapeutic Interventions Met (OT)  does not meet criteria for skilled intervention  -     Therapy Frequency (OT)  evaluation only  -     Row Name 12/05/20 1234          Therapy Plan Review/Discharge Plan (OT)    Anticipated Discharge Disposition (OT)  home  -     Row Name 12/05/20 1234          Vital Signs    Recovery Time  WFL VSS  -[facility] Name 12/05/20 1234          Positioning  and Restraints    Pre-Treatment Position  sitting in chair/recliner  -EMILY     Post Treatment Position  chair  -EMILY     In Chair  notified nsg;reclined;call light within reach;encouraged to call for assist  -EMILY       User Key  (r) = Recorded By, (t) = Taken By, (c) = Cosigned By    Initials Name Provider Type    Coco Cabrera OT Occupational Therapist        Outcome Measures     Row Name 12/05/20 1236          Modified [place] Scale    Modified [place] Scale  1 - No significant disability despite symptoms.  Able to carry out all usual duties and activities.  -EMILY     Row Name 12/05/20 1236          Functional Assessment    Outcome Measure Options  Modified [place]  -EMILY       User Key  (r) = Recorded By, (t) = Taken By, (c) = Cosigned By    Initials Name Provider Type    Coco Cabrera OT Occupational Therapist        Occupational Therapy Education                 Title: PT OT SLP Therapies (Done)     Topic: Occupational Therapy (Done)     Point: ADL training (Done)     Description:   Instruct learner(s) on proper safety adaptation and remediation techniques during self care or transfers.   Instruct in proper use of assistive devices.              Learning Progress Summary           Patient Acceptance, E, VU,DU by EMILY at 12/5/2020 1237                               User Key     Initials Effective Dates Name Provider Type Discipline    EMILY 07/15/20 -  Coco Valdez OT Occupational Therapist OT              OT Recommendation and Plan  Retired Outcome Summary/Treatment Plan (OT)  Anticipated Discharge Disposition (OT): home  Therapy Frequency (OT): evaluation only  Plan of Care Review  Plan of Care Reviewed With: patient  Outcome Summary: Pt is an 86 y/o F presenting episodic/intermittent functional deficit associated with intermittent expressive aphasia with speech and writing abnormality. CT Head negative. NIH currently 0. Pending MRI Brain. PMH includes craniotomy, clipping intra cranial aneurysm  2000. Pt resides in a condo with her daughter, 0 SHERICE. Pt reports indep with ADLs and mobility PTA. Pt is a retired . Pt appears to have returned to baseline level of indep with self care and mobility. No acute OT needs at this time. OT will s/o.  Plan of Care Reviewed With: patient  Outcome Summary: Pt is an 86 y/o F presenting episodic/intermittent functional deficit associated with intermittent expressive aphasia with speech and writing abnormality. CT Head negative. NIH currently 0. Pending MRI Brain. PMH includes craniotomy, clipping intra cranial aneurysm 2000. Pt resides in a condo with her daughter, 0 SHERICE. Pt reports indep with ADLs and mobility PTA. Pt is a retired . Pt appears to have returned to baseline level of indep with self care and mobility. No acute OT needs at this time. OT will s/o.     Time Calculation:   Time Calculation- OT     Row Name 12/05/20 1238             Time Calculation- OT    OT Start Time  1154  -EMILY      OT Stop Time  1214  -EMILY      OT Time Calculation (min)  20 min  -EMILY      Total Timed Code Minutes- OT  20 minute(s)  -EMILY      OT Received On  12/05/20  -EMILY        User Key  (r) = Recorded By, (t) = Taken By, (c) = Cosigned By    Initials Name Provider Type    Coco Cabrera OT Occupational Therapist        Therapy Charges for Today     Code Description Service Date Service Provider Modifiers Qty    [id number]  OT EVAL LOW COMPLEXITY 2 12/5/2020 Coco Valdez OT GO 1               Coco Valdez OT  12/5/2020

## 2020-12-05 NOTE — PLAN OF CARE
Problem: Adult Inpatient Plan of Care  Goal: Plan of Care Review  Outcome: Ongoing, Progressing  Flowsheets (Taken 12/5/2020 1234)  Plan of Care Reviewed With: patient  Outcome Summary: Pt is an 86 y/o F presenting episodic/intermittent functional deficit associated with intermittent expressive aphasia with speech and writing abnormality. CT Head negative. NIH currently 0. Pending MRI Brain. PMH includes craniotomy, clipping intra cranial aneurysm 2000. Pt resides in a condo with her daughter, 0 SHERICE. Pt reports indep with ADLs and mobility PTA. Pt is a retired . Pt appears to have returned to baseline level of indep with self care and mobility. No acute OT needs at this time. OT will s/o.    Patient was wearing a face mask during this therapy encounter. Therapist used appropriate personal protective equipment including mask, gloves, and eye protection.  Mask used was standard procedure mask. Appropriate PPE was worn during the entire therapy session. Hand hygiene was completed before and after therapy session. Patient is not in enhanced droplet precautions.

## 2020-12-05 NOTE — CONSULTS
Neurology Note    Patient:  Gladys Izquierdo    YOB: 1935    REFERRING PHYSICIAN:  Yefri Harris MD    CHIEF COMPLAINT:    AMS    HISTORY OF PRESENT ILLNESS:   The patient is a 85 y.o. female with h/o left sided craniotomy and aneurism clip around  by Dr. Stein, had two spells of disorientation recently, on Wednesday and Thursday. During the first spell she was having difficulty operating a remote control, had difficulty with word finding, also difficulty with writing a [place] card. She has also reported having a headache. Her symptoms resolved after several hours. Her daughter has reported that she was having difficulty communicating and using her phone for several days. The patient is in general denial of a problem, thinks that her symptoms may be be related to her getting frustrated easily. She denies a h/o stroke or seizures.  She has been cleared for po diet. In ED NIHSS 0, T96, in ST, /117, CTH with postop changes, , COVID and UA negative. She received labetalol 10 mg IV, aspirin 324 mg. Her BP has remained high, last 187/118.    Past Medical History:  No past medical history on file.    Past Surgical History:  Past Surgical History:   Procedure Laterality Date   • CEREBRAL ANEURYSM REPAIR     • CHOLECYSTECTOMY     • HYSTERECTOMY      with bladder repair   • OTHER SURGICAL HISTORY      Repair 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 stages, and abdominal hernia repair   • TONSILLECTOMY      age 7       Social History:   Social History     Socioeconomic History   • Marital status:      Spouse name: Not on file   • Number of children: Not on file   • Years of education: Not on file   • Highest education level: Not on file   Tobacco Use   • Smoking status: Former Smoker     Years: 28.00     Types: Cigarettes     Start date:      Quit date:      Years since quittin.9   • Smokeless tobacco: Never Used   • Tobacco comment: less than 1 pack per day for 28 years     Substance and Sexual Activity   • Alcohol use: No   • Drug use: No        Family History:   Family History   Problem Relation Age of Onset   • No Known Problems Brother    • No Known Problems Daughter    • No Known Problems Son    • No Known Problems Daughter        Medications Prior to Admission:    Prior to Admission medications    Medication Sig Start Date End Date Taking? Authorizing Provider   albuterol sulfate HFA (ProAir HFA) 108 (90 Base) MCG/ACT inhaler 2 puffs 4 times daily  Patient taking differently: Every 6 (Six) Hours As Needed. 2 puffs 4 times daily 10/13/20  Yes Grupo Lackey MD   betamethasone, augmented, (DIPROLENE) 0.05 % ointment Apply  topically to the appropriate area as directed.   Yes Paty Turcios MD   calcium-vitamin D (OSCAL-500) 500-200 MG-UNIT per tablet Take 2 tablets by mouth Daily.   Yes Paty Turcios MD   losartan (COZAAR) 100 MG tablet Take 1 tablet by mouth Daily. 4/23/20  Yes Grupo Lackey MD   Magnesium 300 MG capsule Take 1 tablet by mouth Daily.   Yes Paty Turcios MD   meloxicam (MOBIC) 15 MG tablet Take 1 tablet by mouth Daily. 7/21/20  Yes Grupo Lackey MD   Multiple Vitamins-Minerals (CENTRUM ADULTS) tablet Take 1 tablet by mouth Daily.   Yes Paty Turcios MD   nystatin (MYCOSTATIN) 736553 UNIT/GM cream Apply 1 application topically to the appropriate area as directed Daily. 4/23/20  Yes Grupo Lackey MD   nystatin (MYCOSTATIN) 903433 UNIT/GM powder Apply  topically to the appropriate area as directed Daily. 4/23/20  Yes Grupo Lackey MD   Omega-3 Fatty Acids (FISH OIL) 1000 MG capsule capsule Take 1,000 mg by mouth Daily With Breakfast.   Yes Paty Turcios MD   Probiotic Product (PROBIOTIC ADVANCED PO) Take 1 tablet by mouth Daily.   Yes Paty Turcios MD       Allergies:  Demerol [meperidine], Latex, Morphine and related, Dilaudid [hydromorphone hcl], and Levofloxacin      Review of system  Review  of Systems   Neurological: Positive for speech difficulty and headaches.   Psychiatric/Behavioral: Positive for confusion.   All other systems reviewed and are negative.      Vitals:    12/05/20 1059   BP: (!) 187/118   Pulse: 90   Resp:    Temp:    SpO2:        Physical exam  Physical Exam  Constitutional:       Appearance: She is well-developed.   HENT:      Head: Normocephalic and atraumatic.   Eyes:      Extraocular Movements: Extraocular movements intact.      Pupils: Pupils are equal, round, and reactive to light.   Cardiovascular:      Rate and Rhythm: Normal rate and regular rhythm.   Pulmonary:      Effort: Pulmonary effort is normal.   Neurological:      General: No focal deficit present.      Mental Status: She is alert and oriented to person, place, and time.      Deep Tendon Reflexes: Reflexes are normal and symmetric.      Comments: Speech clear, able to name, repeat, read, no facial droop, VFF, no limb drift.   Psychiatric:         Behavior: Behavior normal.         Thought Content: Thought content normal.           Lab Results   Component Value Date    WBC 6.31 12/05/2020    HGB 10.7 (L) 12/05/2020    HCT 32.7 (L) 12/05/2020    MCV 97.0 12/05/2020     12/05/2020     Lab Results   Component Value Date    GLUCOSE 92 12/05/2020    BUN 23 12/05/2020    CREATININE 0.87 12/05/2020    EGFRIFNONA 62 12/05/2020    EGFRIFAFRI 71 10/21/2019    BCR 26.4 (H) 12/05/2020    CO2 25.9 12/05/2020    CALCIUM 9.0 12/05/2020    PROTENTOTREF 7.2 10/21/2019    ALBUMIN 4.00 12/05/2020    LABIL2 2.0 10/21/2019    AST 24 12/05/2020    ALT 14 12/05/2020   Respiratory Panel PCR w/COVID-19(SARS-CoV-2) JESSICA/SHAWANDA/AGUILA/PAD/COR/MAD/MIKE In-House, NP Swab in UTM/VTM, 3-4 HR TAT - Swab, Nasopharynx  Order: [id number] - Part of Panel Order [id number]  Status:  Final result   Visible to patient:  No (not released) Next appt:  None  Specimen Information: Nasopharynx; Swab        Component   Ref Range & Units    ADENOVIRUS, PCR   Not  Detected Not Detected    Coronavirus 229E   Not Detected Not Detected    Coronavirus HKU1   Not Detected Not Detected    Coronavirus NL63   Not Detected Not Detected    Coronavirus OC43   Not Detected Not Detected    COVID19   Not Detected - Ref. Range Not Detected    Human Metapneumovirus   Not Detected Not Detected    Human Rhinovirus/Enterovirus   Not Detected Not Detected    Influenza A PCR   Not Detected Not Detected    Influenza B PCR   Not Detected Not Detected    Parainfluenza Virus 1   Not Detected Not Detected    Parainfluenza Virus 2   Not Detected Not Detected    Parainfluenza Virus 3   Not Detected Not Detected    Parainfluenza Virus 4   Not Detected Not Detected    RSV, PCR   Not Detected Not Detected    Bordetella pertussis pcr   Not Detected Not Detected    Bordetella parapertussis PCR   Not Detected Not Detected    Chlamydophila pneumoniae PCR   Not Detected Not Detected    Mycoplasma pneumo by PCR   Not Detected Not Detected              Urinalysis, Microscopic Only - Urine, Clean Catch  Order: [id number] - Reflex for Order [id number]  Status:  Final result   Visible to patient:  No (not released) Next appt:  None  Specimen Information: Urine, Clean Catch        Component   Ref Range & Units 1d ago 1yr ago   RBC, UA   None Seen, 0-2 /HPF 0-2  0-2Abnormal  R    WBC, UA   None Seen, 0-2 /HPF 3-5Abnormal   3-5Abnormal  R    Bacteria, UA   None Seen /HPF None Seen  None Seen    Squamous Epithelial Cells, UA   None Seen, 0-2 /HPF 0-2  0-2    Hyaline Casts, UA   None Seen /LPF 0-2  None Seen    Methodology  Automated Microscopy  Manual Light Microscopy            ECG 12 Lead  Order: [id number]  Status:  Final result   Visible to patient:  No (not released) Next appt:  None  Component   Ref Range & Units 12/4/20 1605   QT Interval   ms 335       Narrative & Impression    HEART RATE= 113  bpm  RR Interval= 532  ms  GA Interval= 114  ms  P Horizontal Axis= -58  deg  P Front Axis= 0  deg  QRSD Interval= 106   ms  QT Interval= 335  ms  QRS Axis= -44  deg  T Wave Axis= 97  deg  - ABNORMAL ECG -  Sinus tachycardia  Left anterior fascicular block  LVH with secondary repolarization abnormality  Anterior Q waves, possibly due to LVH  Rate faster  Electronically Signed By: Michael Willams ([facility]) 04-Dec-2020 16:10:29  Date and Time of Study: 2020-12-04 16:05:05      Specimen Collected: 12/04/20 16:05               Radiological Studies:  Ct Head Without Contrast    Result Date: 12/4/2020  CT OF THE BRAIN WITHOUT CONTRAST 12/04/2020  HISTORY: Headache. Confusion.  Axial images were obtained through the brain without intravenous contrast. There is mild diffuse atrophy. There is mild decreased attenuation of the periventricular white matter bilaterally consistent with mild small vessel white matter ischemic disease.  There is no evidence of acute infarction, hemorrhage, midline shift or mass effect. There is evidence of previous left frontotemporal craniotomy. Vascular clip is seen in the left suprasellar region.  No other bony abnormalities are seen.      1.  Evidence of previous left frontotemporal craniotomy and apparent aneurysm clipping. 2.  No acute process identified.  Radiation dose reduction techniques were utilized, including automated exposure control and exposure modulation based on body size.  This report was finalized on 12/4/2020 6:35 PM by Dr. Reinaldo Flores M.D.        During this visit the following were done:  Labs Reviewed [x]    Labs Ordered []    Radiology Reports Reviewed [x]    Radiology Ordered [x]    EKG, echo, and/or stress test reviewed [x]    EEG results reviewed  []    EEG reviewed and interpreted per myself   []    Discussed case with neurointerventionalist or neuroradiologist []    Referring Provider Records Reviewed []    ER Records Reviewed []    Hospital Records Reviewed []    History Obtained From Family []    Radiological images view and Interpreted per myself [x]    Case Discussed with  referring provider []     Decision to obtain and request outside records  []        Assessment and Plan     Recurrent AMS, aphasia and headache in the setting of HTN, favor hypertensive encephalopathy, stroke is a consideration. Remote h/o left temporal craniotomy and cerebral aneurism clipping.   - Observation on telemetry.   - Aspirin and statin.   - SBP<200, DBP<110, okay to normalize if no high grade stenosis on CTA.   - MRI brain.   - CTA head and neck.   - TTE.   - EEG.   - TSH, T4.    Thanks,      Electronically signed by Ean Robles MD on 12/5/2020 at 11:41 EST

## 2020-12-05 NOTE — H&P
Internal medicine history and physical  INTERNAL MEDICINE   [facility]       Patient Identification:  Name: Gladys Izquierdo  Age: 85 y.o.  Sex: female  :  1935  MRN: [id number]                   Primary Care Physician: Grupo Lackey MD                               Date of admission:2020    Chief Complaint: Unable to operate remote control for TV on Wednesday evening.    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  She had to ask her daughter to get the child that she wanted to watch.  That lasted for few hours and eventually she recovered and able to use remote control and went to bed.  She was frustrated with her inability to operate the remote control but that frustration was attributed to her underlying tendency to be that way in the situations.  Patient had another episode Thursday morning but did not last very long.  Associated headache since Wednesday evening which comes and goes.  Her daughter who lives with her communicated to the ER physician that patient has at times difficulty in communicating the last 2 days in terms of finding proper words and also has issues when she was writing Silke cards and wrote something that did not make any sense.  Patient was concerned that one of their acquaintances had similar symptoms and was diagnosed with Covid infection.  She went to urgent care to get COVID-19 assay performed and was reported to be negative.  She was also had urinalysis which was unremarkable.  Patient currently feels fine denies any symptoms and claims that she is feeling better.  Patient is being admitted for further work-up for these  episodic loss of function and intermittent worsening of her cognition.  Concern for ischemic stroke was raised.  Patient denies any focal weakness of arm or legs.      Past Medical History:  No past medical history on file.  Past Surgical History:  Past Surgical History:   Procedure Laterality Date   • CEREBRAL ANEURYSM REPAIR  2000   • CHOLECYSTECTOMY  2005   • HYSTERECTOMY      with bladder repair   • OTHER SURGICAL HISTORY      Repair 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 stages, and abdominal hernia repair   • TONSILLECTOMY      age 7      Home Meds:  (Not in a hospital admission)    Current Meds:     Current Facility-Administered Medications:   •  [COMPLETED] Insert peripheral IV, , , Once **AND** sodium chloride 0.9 % flush 10 mL, 10 mL, Intravenous, PRN, Tom Michelle MD    Current Outpatient Medications:   •  albuterol sulfate HFA (ProAir HFA) 108 (90 Base) MCG/ACT inhaler, 2 puffs 4 times daily, Disp: 8.5 g, Rfl: 2  •  baclofen (LIORESAL) 10 MG tablet, Take 1 tablet by mouth Daily., Disp: 20 tablet, Rfl: 0  •  betamethasone, augmented, (DIPROLENE) 0.05 % ointment, Apply  topically to the appropriate area as directed., Disp: , Rfl:   •  calcium-vitamin D (OSCAL-500) 500-200 MG-UNIT per tablet, Take 2 tablets by mouth Daily., Disp: , Rfl:   •  losartan (COZAAR) 100 MG tablet, Take 1 tablet by mouth Daily., Disp: 90 tablet, Rfl: 3  •  Magnesium 300 MG capsule, Take 1 tablet by mouth Daily., Disp: , Rfl:   •  meloxicam (MOBIC) 15 MG tablet, Take 1 tablet by mouth Daily., Disp: 90 tablet, Rfl: 3  •  Multiple Vitamins-Minerals (CENTRUM ADULTS) tablet, Take 1 tablet by mouth Daily., Disp: , Rfl:   •  naproxen (EC NAPROSYN) 500 MG EC tablet, Take 1 tablet by mouth 2 (Two) Times a Day With Meals., Disp: 60 tablet, Rfl: 3  •  nystatin (MYCOSTATIN) 509114 UNIT/GM cream, Apply 1 application topically to the appropriate area as directed Daily., Disp: 30 g, Rfl: 4  •  nystatin (MYCOSTATIN) 902862 UNIT/GM powder, Apply   "topically to the appropriate area as directed Daily., Disp: 60 g, Rfl: 3  •  Omega-3 Fatty Acids (FISH OIL) 1000 MG capsule capsule, Take 1,000 mg by mouth Daily With Breakfast., Disp: , Rfl:   •  Probiotic Product (PROBIOTIC ADVANCED PO), Take 1 tablet by mouth Daily., Disp: , Rfl:   •  sulfamethoxazole-trimethoprim (BACTRIM DS,SEPTRA DS) 800-160 MG per tablet, Take 1 tablet by mouth 2 (Two) Times a Day., Disp: 14 tablet, Rfl: 0  Allergies:  Allergies   Allergen Reactions   • Demerol [Meperidine]      B/P problems   • Latex      blisters   • Morphine And Related      Decreased b/p   • Dilaudid [Hydromorphone Hcl] Rash   • Levofloxacin Palpitations     Social History:   Social History     Tobacco Use   • Smoking status: Former Smoker     Years: 28.00     Types: Cigarettes     Start date:      Quit date:      Years since quittin.9   • Smokeless tobacco: Never Used   • Tobacco comment: less than 1 pack per day for 28 years    Substance Use Topics   • Alcohol use: No      Family History:  Family History   Problem Relation Age of Onset   • No Known Problems Brother    • No Known Problems Daughter    • No Known Problems Son    • No Known Problems Daughter           Review of Systems  See history of present illness and past medical history.    Constitutional: Remarkable for no fever or chills  Cardiovascular: Remarkable for no chest pain or shortness of breath  GI: Remarkable for no nausea vomiting or diarrhea  : Remarkable for no burning in urination frequency urgency  Musculoskeletal: Remarkable for no specific joint aches and pain  Neurological: Remarkable for episodic functional decline with associated inability to communicate with resolution.  As detailed in the history of presenting illness.  Vitals:   BP (!) 141/104 (BP Location: Left arm, Patient Position: Sitting)   Pulse 80   Temp 96.1 °F (35.6 °C)   Resp 18   Ht 154.9 cm (61\")   Wt 68 kg (150 lb)   SpO2 94%   BMI 28.34 kg/m²   I/O: No " intake or output data in the 24 hours ending 12/04/20 1914  Exam:  Patient is examined using the personal protective equipment as per guidelines from infection control for this particular patient as enacted.  Hand washing was performed before and after patient interaction.  General Appearance:    Alert, cooperative, no distress, appears stated age   Head:    Normocephalic, without obvious abnormality, atraumatic   Eyes:    PERRL, conjunctiva/corneas clear, EOM's intact, both eyes   Ears:    Normal external ear canals, both ears   Nose:   Nares normal, septum midline, mucosa normal, no drainage    or sinus tenderness   Throat:   Lips, tongue, gums normal; oral mucosa pink and moist   Neck:   Supple, symmetrical, trachea midline, no adenopathy;     thyroid:  no enlargement/tenderness/nodules; no carotid    bruit or JVD   Back:     Symmetric, no curvature, ROM normal, no CVA tenderness   Lungs:     Clear to auscultation bilaterally, respirations unlabored   Chest Wall:    No tenderness or deformity    Heart:    Regular rate and rhythm, S1 and S2 normal, no murmur, rub   or gallop   Abdomen:     Soft, non-tender, bowel sounds active all four quadrants,     no masses, no hepatomegaly, no splenomegaly   Extremities:   Extremities normal, atraumatic, no cyanosis or edema   Pulses:   Pulses palpable in all extremities; symmetric all extremities   Skin:   Skin color normal, Skin is warm and dry,  no rashes or palpable lesions   Neurologic:  Grossly nonfocal       Data Review:      I reviewed the patient's new clinical results.  Results from last 7 days   Lab Units 12/04/20  1547   WBC 10*3/mm3 8.74   HEMOGLOBIN g/dL 12.6   PLATELETS 10*3/mm3 291     Results from last 7 days   Lab Units 12/04/20  1547   SODIUM mmol/L 141   POTASSIUM mmol/L 4.3   CHLORIDE mmol/L 103   CO2 mmol/L 27.3   BUN mg/dL 27*   CREATININE mg/dL 0.89   CALCIUM mg/dL 9.8   GLUCOSE mg/dL 100*     Ct Head Without Contrast    Result Date: 12/4/2020  1.   Evidence of previous left frontotemporal craniotomy and apparent aneurysm clipping. 2.  No acute process identified.  Radiation dose reduction techniques were utilized, including automated exposure control and exposure modulation based on body size.  This report was finalized on 12/4/2020 6:35 PM by Dr. Reinaldo Flores M.D.      ECG 12 Lead   Final Result   HEART RATE= 113  bpm   RR Interval= 532  ms   AZ Interval= 114  ms   P Horizontal Axis= -58  deg   P Front Axis= 0  deg   QRSD Interval= 106  ms   QT Interval= 335  ms   QRS Axis= -44  deg   T Wave Axis= 97  deg   - ABNORMAL ECG -   Sinus tachycardia   Left anterior fascicular block   LVH with secondary repolarization abnormality   Anterior Q waves, possibly due to LVH   Rate faster   Electronically Signed By: Michael Willams ([facility]) 04-Dec-2020 16:10:29   Date and Time of Study: 2020-12-04 16:05:05        Microbiology Results (last 10 days)     Procedure Component Value - Date/Time    COVID PRE-OP / PRE-PROCEDURE SCREENING ORDER (NO ISOLATION) - Swab, Nasopharynx [[id number]]  (Normal) Collected: 12/04/20 1601    Lab Status: Final result Specimen: Swab from Nasopharynx Updated: 12/04/20 1717    Narrative:      The following orders were created for panel order COVID PRE-OP / PRE-PROCEDURE SCREENING ORDER (NO ISOLATION) - Swab, Nasopharynx.  Procedure                               Abnormality         Status                     ---------                               -----------         ------                     Respiratory Panel PCR w/...[[id number]]  Normal              Final result                 Please view results for these tests on the individual orders.    Respiratory Panel PCR w/COVID-19(SARS-CoV-2) JESSICA/SHAWANDA/AGUILA/PAD/COR/MAD/MIKE In-House, NP Swab in UTM/VTM, 3-4 HR TAT - Swab, Nasopharynx [[id number]]  (Normal) Collected: 12/04/20 1601    Lab Status: Final result Specimen: Swab from Nasopharynx Updated: 12/04/20 1717     ADENOVIRUS, PCR Not Detected      Coronavirus 229E Not Detected     Coronavirus HKU1 Not Detected     Coronavirus NL63 Not Detected     Coronavirus OC43 Not Detected     COVID19 Not Detected     Human Metapneumovirus Not Detected     Human Rhinovirus/Enterovirus Not Detected     Influenza A PCR Not Detected     Influenza B PCR Not Detected     Parainfluenza Virus 1 Not Detected     Parainfluenza Virus 2 Not Detected     Parainfluenza Virus 3 Not Detected     Parainfluenza Virus 4 Not Detected     RSV, PCR Not Detected     Bordetella pertussis pcr Not Detected     Bordetella parapertussis PCR Not Detected     Chlamydophila pneumoniae PCR Not Detected     Mycoplasma pneumo by PCR Not Detected    Narrative:      Fact sheet for providers: https://docs.[employer]/wp-content/uploads/SPD5855-4236-AF1.1-EUA-Provider-Fact-Sheet-3.pdf    Fact sheet for patients: https://docs.[employer]/wp-content/uploads/CKA8313-3822-VO7.1-EUA-Patient-Fact-Sheet-1.pdf              Assessment:  Active Hospital Problems    Diagnosis POA   • **Cognitive impairment [R41.89] Yes   • Psoriasis [L40.9] Yes   • Asthma, 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  Plan is to admit the patient provide her with neurochecks get MRI of her brain and MRA of head and neck vessels and consult neurology service.  See admitting orders for other management plan.  Hypertension-continue antihypertensive regimen and avoid hypotensive episode.  History of asthma currently compensated denies any respiratory issues.  History of intracranial aneurysm status post craniotomy and clipping in 2000 currently stable with no focal deficits.  Degenerative disc disease of the spine  provides with symptomatic management and current treatment program.      Yefri Harris MD   12/4/2020  19:14 EST  Much of this encounter note is an electronic transcription/translation of spoken language to printed text. The electronic translation of spoken language may permit erroneous, or at times, nonsensical words or phrases to be inadvertently transcribed; Although I have reviewed the note for such errors, some may still exist

## 2020-12-05 NOTE — PLAN OF CARE
Problem: Adult Inpatient Plan of Care  Goal: Plan of Care Review  Outcome: Ongoing, Progressing  Flowsheets (Taken 12/5/2020 0317)  Progress: no change  Plan of Care Reviewed With: patient  Outcome Summary: A&Ox4, NIH 0, BP elevated, treated in ER once, c/o headache tylenol given, gait steady, brief run of afib of the monitor around 1am, patient asymptomatic, IV fluids started, passed bedside swallow, MRI to be done, will continue to monitor.

## 2020-12-05 NOTE — PROGRESS NOTES
" LOS: 1 day     Name: Gladys Izquierdo  Age: 85 y.o.  Sex: female  :  1935  MRN: [id number]         Primary Care Physician: Grupo Lackey MD    Subjective   Subjective  \"All I came here for was a Covid test\", \"I do not know why I have to stay\", \"I was left here by my daughter\".  Has no complaints and denies any form of aphasia.    Objective   Vital Signs  Temp:  [96.1 °F (35.6 °C)-98.2 °F (36.8 °C)] 98.2 °F (36.8 °C)  Heart Rate:  [] 90  Resp:  [16-18] 16  BP: (141-205)/() 187/118  Body mass index is 29.29 kg/m².    Objective:  General Appearance:  Comfortable and in no acute distress.    Vital signs: (most recent): Blood pressure (!) 187/118, pulse 90, temperature 98.2 °F (36.8 °C), temperature source Oral, resp. rate 16, height 154.9 cm (61\"), weight 70.3 kg (155 lb), SpO2 96 %.    Lungs:  Normal effort and normal respiratory rate.    Heart: Normal rate.  Regular rhythm.    Abdomen: Abdomen is soft.  Bowel sounds are normal.   There is no abdominal tenderness.     Extremities: There is no dependent edema or local swelling.    Neurological: Patient is alert and oriented to person, place and time.    Skin:  Warm and dry.              Results Review:       I reviewed the patient's new clinical results.    Results from last 7 days   Lab Units 20  0456 20  1547   WBC 10*3/mm3 6.31 8.74   HEMOGLOBIN g/dL 10.7* 12.6   PLATELETS 10*3/mm3 248 291     Results from last 7 days   Lab Units 20  0456 20  1547   SODIUM mmol/L 136 141   POTASSIUM mmol/L 4.6 4.3   CHLORIDE mmol/L 102 103   CO2 mmol/L 25.9 27.3   BUN mg/dL 23 27*   CREATININE mg/dL 0.87 0.89   CALCIUM mg/dL 9.0 9.8   GLUCOSE mg/dL 92 100*     Results from last 7 days   Lab Units 20  1547   INR  0.88*             Scheduled Meds:   aspirin, 325 mg, Oral, Daily    Or  aspirin, 300 mg, Rectal, Daily  atorvastatin, 80 mg, Oral, Nightly  calcium-vitamin D, 1,000 mg, Oral, Daily  lactobacillus acidophilus, 1 capsule, Oral, " Daily  losartan, 100 mg, Oral, Daily  multivitamin with minerals, 1 tablet, Oral, Daily  nystatin, , Topical, Daily  sodium chloride, 10 mL, Intravenous, Q12H      PRN Meds:   •  acetaminophen  •  albuterol  •  ondansetron  •  [COMPLETED] Insert peripheral IV **AND** sodium chloride  •  sodium chloride  Continuous Infusions:  sodium chloride, 75 mL/hr, Last Rate: 75 mL/hr (12/04/20 2145)        Assessment/Plan   Active Hospital Problems    Diagnosis  POA   • **Cognitive impairment [R41.89]  Yes   • Psoriasis [L40.9]  Yes   • Asthma, 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       Assessment & Plan    -Neurologic work-up has been ordered including brain MRI, CT angiogram of the head and neck, echocardiogram, and EEG.  -Neurology has been consulted        I wore full protective equipment throughout the patient encounter including eye protection and facemask.  Hand hygiene was performed before donning protective equipment and after removal when leaving the room.    Gray Hall MD  [facility]  12/05/20  13:18 EST

## 2020-12-05 NOTE — PROGRESS NOTES
Screening per stroke order set. CT negative for stroke. PT and OT note pt to be at baseline and have signed off. Will also sign off. If something changes, please reorder for a full acute rehab evaluation.     Viridiana Strong RN  Acute Rehab Admission Nurse

## 2020-12-06 ENCOUNTER — APPOINTMENT (OUTPATIENT)
Dept: CT IMAGING | Facility: HOSPITAL | Age: 85
End: 2020-12-06

## 2020-12-06 LAB
ANION GAP SERPL CALCULATED.3IONS-SCNC: 9.8 MMOL/L (ref 5–15)
BUN SERPL-MCNC: 18 MG/DL (ref 8–23)
BUN/CREAT SERPL: 21.2 (ref 7–25)
CALCIUM SPEC-SCNC: 8.8 MG/DL (ref 8.6–10.5)
CHLORIDE SERPL-SCNC: 108 MMOL/L (ref 98–107)
CO2 SERPL-SCNC: 24.2 MMOL/L (ref 22–29)
CREAT SERPL-MCNC: 0.85 MG/DL (ref 0.57–1)
DEPRECATED RDW RBC AUTO: 40.2 FL (ref 37–54)
ERYTHROCYTE [DISTWIDTH] IN BLOOD BY AUTOMATED COUNT: 11.6 % (ref 12.3–15.4)
GFR SERPL CREATININE-BSD FRML MDRD: 64 ML/MIN/1.73
GLUCOSE SERPL-MCNC: 89 MG/DL (ref 65–99)
HCT VFR BLD AUTO: 34.1 % (ref 34–46.6)
HGB BLD-MCNC: 11.2 G/DL (ref 12–15.9)
MCH RBC QN AUTO: 31.6 PG (ref 26.6–33)
MCHC RBC AUTO-ENTMCNC: 32.8 G/DL (ref 31.5–35.7)
MCV RBC AUTO: 96.3 FL (ref 79–97)
PLATELET # BLD AUTO: 243 10*3/MM3 (ref 140–450)
PMV BLD AUTO: 9.8 FL (ref 6–12)
POTASSIUM SERPL-SCNC: 4.2 MMOL/L (ref 3.5–5.2)
RBC # BLD AUTO: 3.54 10*6/MM3 (ref 3.77–5.28)
SODIUM SERPL-SCNC: 142 MMOL/L (ref 136–145)
T4 FREE SERPL-MCNC: 1.12 NG/DL (ref 0.93–1.7)
TSH SERPL DL<=0.05 MIU/L-ACNC: 8.59 UIU/ML (ref 0.27–4.2)
WBC # BLD AUTO: 7.09 10*3/MM3 (ref 3.4–10.8)

## 2020-12-06 PROCEDURE — G0378 HOSPITAL OBSERVATION PER HR: HCPCS

## 2020-12-06 PROCEDURE — 80048 BASIC METABOLIC PNL TOTAL CA: CPT | Performed by: INTERNAL MEDICINE

## 2020-12-06 PROCEDURE — 84439 ASSAY OF FREE THYROXINE: CPT | Performed by: PSYCHIATRY & NEUROLOGY

## 2020-12-06 PROCEDURE — 25010000002 IOPAMIDOL 61 % SOLUTION: Performed by: INTERNAL MEDICINE

## 2020-12-06 PROCEDURE — 99214 OFFICE O/P EST MOD 30 MIN: CPT | Performed by: NURSE PRACTITIONER

## 2020-12-06 PROCEDURE — 70496 CT ANGIOGRAPHY HEAD: CPT

## 2020-12-06 PROCEDURE — 84443 ASSAY THYROID STIM HORMONE: CPT | Performed by: PSYCHIATRY & NEUROLOGY

## 2020-12-06 PROCEDURE — 70498 CT ANGIOGRAPHY NECK: CPT

## 2020-12-06 PROCEDURE — 85027 COMPLETE CBC AUTOMATED: CPT | Performed by: INTERNAL MEDICINE

## 2020-12-06 PROCEDURE — 96361 HYDRATE IV INFUSION ADD-ON: CPT

## 2020-12-06 RX ADMIN — SODIUM CHLORIDE, PRESERVATIVE FREE 10 ML: 5 INJECTION INTRAVENOUS at 08:53

## 2020-12-06 RX ADMIN — ASPIRIN 325 MG: 325 TABLET ORAL at 08:53

## 2020-12-06 RX ADMIN — LOSARTAN POTASSIUM 100 MG: 100 TABLET, FILM COATED ORAL at 08:53

## 2020-12-06 RX ADMIN — MULTIPLE VITAMINS W/ MINERALS TAB 1 TABLET: TAB at 08:53

## 2020-12-06 RX ADMIN — ATORVASTATIN CALCIUM 80 MG: 80 TABLET, FILM COATED ORAL at 20:42

## 2020-12-06 RX ADMIN — IOPAMIDOL 85 ML: 612 INJECTION, SOLUTION INTRAVENOUS at 09:11

## 2020-12-06 RX ADMIN — CALCIUM CARBONATE-VITAMIN D TAB 500 MG-200 UNIT 1000 MG: 500-200 TAB at 08:53

## 2020-12-06 RX ADMIN — SODIUM CHLORIDE 75 ML/HR: 9 INJECTION, SOLUTION INTRAVENOUS at 20:42

## 2020-12-06 RX ADMIN — Medication 1 CAPSULE: at 08:53

## 2020-12-06 NOTE — PROGRESS NOTES
" LOS: 1 day     Name: Gladys Izquierdo  Age: 85 y.o.  Sex: female  :  1935  MRN: [id number]         Primary Care Physician: Grupo Lackey MD    Subjective   Subjective  Says that she feels fine today.  No complaints.  Asking when she can be discharged.    Objective   Vital Signs  Temp:  [97.8 °F (36.6 °C)-98.5 °F (36.9 °C)] 97.8 °F (36.6 °C)  Heart Rate:  [] 96  Resp:  [16-18] 18  BP: (150-199)/() 155/81  Body mass index is 29.19 kg/m².    Objective:  General Appearance:  Comfortable and in no acute distress.    Vital signs: (most recent): Blood pressure 155/81, pulse 96, temperature 97.8 °F (36.6 °C), temperature source Oral, resp. rate 18, height 154.9 cm (61\"), weight 70.1 kg (154 lb 8 oz), SpO2 96 %.    Lungs:  Normal effort and normal respiratory rate.    Heart: Normal rate.  Regular rhythm.    Abdomen: Abdomen is soft.  Bowel sounds are normal.   There is no abdominal tenderness.     Extremities: There is no dependent edema or local swelling.    Neurological: Patient is alert and oriented to person, place and time.    Skin:  Warm and dry.              Results Review:       I reviewed the patient's new clinical results.    Results from last 7 days   Lab Units 20  0732 20  0456 20  1547   WBC 10*3/mm3 7.09 6.31 8.74   HEMOGLOBIN g/dL 11.2* 10.7* 12.6   PLATELETS 10*3/mm3 243 248 291     Results from last 7 days   Lab Units 20  0732 20  0456 20  1547   SODIUM mmol/L 142 136 141   POTASSIUM mmol/L 4.2 4.6 4.3   CHLORIDE mmol/L 108* 102 103   CO2 mmol/L 24.2 25.9 27.3   BUN mg/dL 18 23 27*   CREATININE mg/dL 0.85 0.87 0.89   CALCIUM mg/dL 8.8 9.0 9.8   GLUCOSE mg/dL 89 92 100*     Results from last 7 days   Lab Units 20  1547   INR  0.88*             Scheduled Meds:   aspirin, 325 mg, Oral, Daily    Or  aspirin, 300 mg, Rectal, Daily  atorvastatin, 80 mg, Oral, Nightly  calcium-vitamin D, 1,000 mg, Oral, Daily  lactobacillus acidophilus, 1 capsule, Oral, " Daily  losartan, 100 mg, Oral, Daily  multivitamin with minerals, 1 tablet, Oral, Daily  nystatin, , Topical, Daily  sodium chloride, 10 mL, Intravenous, Q12H      PRN Meds:   •  acetaminophen  •  albuterol  •  labetalol  •  ondansetron  •  [COMPLETED] Insert peripheral IV **AND** sodium chloride  •  sodium chloride  Continuous Infusions:  sodium chloride, 75 mL/hr, Last Rate: Stopped (12/06/20 0857)        Assessment/Plan   Active Hospital Problems    Diagnosis  POA   • **Cognitive impairment [R41.89]  Yes   • Aphasia [R47.01]  Yes   • Psoriasis [L40.9]  Yes   • Asthma, 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       Assessment & Plan    -Neurologic symptoms have completely resolved.  Awaiting results of MRI brain and CT angio of head and neck.  Appreciate input from neurology.  If the studies are negative then she can have EEG in the outpatient setting.  -Blood pressure better controlled today  -Continue aspirin high-dose statin for now        I wore full protective equipment throughout the patient encounter including eye protection and facemask.  Hand hygiene was performed before donning protective equipment and after removal when leaving the room.    Gray Hall MD  [facility]  12/06/20  11:38 EST

## 2020-12-06 NOTE — PLAN OF CARE
Goal Outcome Evaluation:  Plan of Care Reviewed With: patient  Progress: improving  Outcome Summary: A/O, no complaints of pain, NIH 0, will need EEG and CTA, can have high SBP, taken manually, IVF infusing, will continue to monitor.

## 2020-12-06 NOTE — PROGRESS NOTES
"DOS: 2020  NAME: Gladys Izquierdo   : 1935  PCP: Grupo Lackey MD  Chief Complaint   Patient presents with   • Memory Loss     Stroke    Subjective: Patient states she's at baseline.  Denies headache, focal numbness/weakness, or speech difficulty.  Pt seen in follow up today, however the problem is new to the examiner.      Objective:  Vital signs: /81 (BP Location: Left arm, Patient Position: Sitting)   Pulse 96   Temp 97.8 °F (36.6 °C) (Oral)   Resp 18   Ht 154.9 cm (61\")   Wt 70.1 kg (154 lb 8 oz)   SpO2 96%   BMI 29.19 kg/m²       General appearance: Well developed, well nourished, well groomed, alert and cooperative.   HEENT: Normocephalic.   Neck and spine: Normal range of motion. Normal alignment. No mass or tenderness.    Cardiac: Regular rate and rhythm. No murmurs.   Peripheral Vasculature: Radial pulses are equal and symmetric.  Chest Exam: Clear to auscultation bilaterally, no wheezes, no rhonchi.  Extremities: Normal, no edema.   Skin: No rashes or birthmarks.     Higher integrative function: Oriented to time, place, person, intact recent and remote memory, attention span, concentration and language. Spontaneous speech, fund of vocabulary are normal.   CN II: Normal visual fields.   CN III IV VI: Extraocular movements are full without nystagmus. Pupils are equal, round, and reactive to light.   CN V: Normal facial sensation.  CN VII: Facial movements are symmetric, no weakness.   CN VIII: Auditory acuity is normal.   CN IX & X: Symmetric palatal movement.   CN XI: Sternocleidomastoid and trapezius are normal. No weakness.   CN XII: The tongue is midline. .   Motor: Normal muscle strength, bulk, and tone in upper and lower extremities. No fasciculations, rigidity, spasticity or abnormal movements.   Sensation: LT intact/symmetric.  Station and gait: Deferred.  Muscle stretch reflexes:. Plantar reflexes are flexor bilaterally.   Coordination: Finger to nose test showed no " dysmetria. Rapid alternating movements were normal. Heel to shin normal.     Scheduled Meds:aspirin, 325 mg, Oral, Daily    Or  aspirin, 300 mg, Rectal, Daily  atorvastatin, 80 mg, Oral, Nightly  calcium-vitamin D, 1,000 mg, Oral, Daily  lactobacillus acidophilus, 1 capsule, Oral, Daily  losartan, 100 mg, Oral, Daily  multivitamin with minerals, 1 tablet, Oral, Daily  nystatin, , Topical, Daily  sodium chloride, 10 mL, Intravenous, Q12H      Continuous Infusions:sodium chloride, 75 mL/hr, Last Rate: Stopped (12/06/20 0857)      PRN Meds:.•  acetaminophen  •  albuterol  •  labetalol  •  ondansetron  •  [COMPLETED] Insert peripheral IV **AND** sodium chloride  •  sodium chloride    Laboratory results:  Lab Results   Component Value Date    GLUCOSE 89 12/06/2020    CALCIUM 8.8 12/06/2020     12/06/2020    K 4.2 12/06/2020    CO2 24.2 12/06/2020     (H) 12/06/2020    BUN 18 12/06/2020    CREATININE 0.85 12/06/2020    EGFRIFAFRI 71 10/21/2019    EGFRIFNONA 64 12/06/2020    BCR 21.2 12/06/2020    ANIONGAP 9.8 12/06/2020     Lab Results   Component Value Date    WBC 7.09 12/06/2020    HGB 11.2 (L) 12/06/2020    HCT 34.1 12/06/2020    MCV 96.3 12/06/2020     12/06/2020     Lab Results   Component Value Date    CHOL 172 12/05/2020     Lab Results   Component Value Date    HDL 80 (H) 12/05/2020    HDL 87 (H) 10/23/2017    HDL 92 (H) 08/04/2016     Lab Results   Component Value Date    LDL 80 12/05/2020    LDL 89 10/23/2017     (H) 08/04/2016     Lab Results   Component Value Date    TRIG 62 12/05/2020    TRIG 81 10/23/2017    TRIG 62 08/04/2016     Results from last 7 days   Lab 12/05/20  0456   HEMOGLOBIN A1C 5.40   ECG 12/4 tracings viewed by me, shows sinus tachycardia.  Review and interpretation of imaging: CT head images viewed by me, no obvious acute findings seen.  CT OF THE BRAIN WITHOUT CONTRAST 12/04/2020     HISTORY: Headache. Confusion.     Axial images were obtained through the brain  without intravenous  contrast. There is mild diffuse atrophy. There is mild decreased  attenuation of the periventricular white matter bilaterally consistent  with mild small vessel white matter ischemic disease.     There is no evidence of acute infarction, hemorrhage, midline shift or  mass effect. There is evidence of previous left frontotemporal  craniotomy. Vascular clip is seen in the left suprasellar region.     No other bony abnormalities are seen.     IMPRESSION:  1.  Evidence of previous left frontotemporal craniotomy and apparent  aneurysm clipping.  2.  No acute process identified.     Radiation dose reduction techniques were utilized, including automated  exposure control and exposure modulation based on body size.     This report was finalized on 12/4/2020 6:35 PM by Dr. Reinaldo Flores M.D.     Echocardiogram Findings    Left Ventricle Calculated left ventricular EF = 58.2% Estimated left ventricular EF = 58% Left ventricular systolic function is normal.   Normal left ventricular cavity size noted. Left ventricular wall thickness is consistent with mild septal asymmetric hypertrophy. All left ventricular wall segments contract normally. Left ventricular diastolic function was indeterminate.   Right Ventricle Normal right ventricular cavity size and systolic function noted.   Left Atrium Normal left atrial cavity size noted.  Saline test results are negative.   Right Atrium Normal right atrial cavity size noted.   Aortic Valve No aortic valve regurgitation or stenosis is present. The aortic valve is abnormal in structure. There is mild calcification of the aortic valve.   Mitral Valve Mild mitral annular calcification is present. Mild mitral valve regurgitation is present. No significant mitral valve stenosis is present.   Tricuspid Valve Mild tricuspid valve regurgitation is present. Estimated right ventricular systolic pressure from tricuspid regurgitation is mildly elevated (35-45 mmHg).  Calculated right ventricular systolic pressure from tricuspid regurgitation is 38 mmHg.   Pulmonic Valve The pulmonic valve is grossly normal in structure.   Greater Vessels No dilation of the aortic root is present.   Pericardium There is no evidence of pericardial effusion. .       Impression:  This patient is an 85-year-old female with previous left-sided craniotomy and aneurysm clip around 2000 by  who presented 12/4 with AMS.  Patient recently had 2 spells of disorientation, on Wednesday and Thursday.  During the first spell she was having difficulty operating a remote control and had difficulty with word finding and difficulty writing her Silke card.  She is also reported a headache.  Those symptoms resolved over several hours.  The patient is also had difficulty communicating and using her phone for several days.  Patient denies a history of stroke or seizures.  BP in /117, NIHSS 0, T 96, and ST, and CT head showed postop changes.  She was not on an AED, aspirin, or statin prior to arrival.    Work-up:  2D echo: EF 58%, saline test results negative, normal left atrial cavity size, mild tricuspid regurgitation.  Labs: Hemoglobin A1c 5.40%, LDL 80, magnesium 2.6, troponin less than 0.10, UA unremarkable, COVID-19 testing negative 12/4.  TSH 8.5.    Diagnoses: Recurrent AMS, aphasia, and headache in the setting of hypertension, favor hypertensive encephalopathy versus stroke or seizure      Plan:  Follow up MRI brain  Follow-up CTA head/neck  EEG ordered, if MRI brain and cta head and neck are negative EEG can be done as outpatient with f/u with me.   mg started  Lipitor 80 mg started  Neurochecks  BP control- gradually normalize, better today  Stroke Education  IDA/SCDs  PT/OT/ST  D/W Dr Robles today. Will follow.

## 2020-12-07 ENCOUNTER — READMISSION MANAGEMENT (OUTPATIENT)
Dept: CALL CENTER | Facility: HOSPITAL | Age: 85
End: 2020-12-07

## 2020-12-07 ENCOUNTER — APPOINTMENT (OUTPATIENT)
Dept: NEUROLOGY | Facility: HOSPITAL | Age: 85
End: 2020-12-07

## 2020-12-07 VITALS
BODY MASS INDEX: 29.07 KG/M2 | HEART RATE: 116 BPM | WEIGHT: 154 LBS | TEMPERATURE: 97.6 F | HEIGHT: 61 IN | SYSTOLIC BLOOD PRESSURE: 164 MMHG | OXYGEN SATURATION: 93 % | RESPIRATION RATE: 20 BRPM | DIASTOLIC BLOOD PRESSURE: 84 MMHG

## 2020-12-07 PROCEDURE — 99213 OFFICE O/P EST LOW 20 MIN: CPT | Performed by: NURSE PRACTITIONER

## 2020-12-07 PROCEDURE — 95816 EEG AWAKE AND DROWSY: CPT

## 2020-12-07 PROCEDURE — G0378 HOSPITAL OBSERVATION PER HR: HCPCS

## 2020-12-07 PROCEDURE — 95816 EEG AWAKE AND DROWSY: CPT | Performed by: PSYCHIATRY & NEUROLOGY

## 2020-12-07 RX ORDER — ASPIRIN 81 MG/1
81 TABLET, CHEWABLE ORAL DAILY
Status: DISCONTINUED | OUTPATIENT
Start: 2020-12-08 | End: 2020-12-07 | Stop reason: HOSPADM

## 2020-12-07 RX ORDER — HYDRALAZINE HYDROCHLORIDE 25 MG/1
25 TABLET, FILM COATED ORAL ONCE
Status: DISCONTINUED | OUTPATIENT
Start: 2020-12-07 | End: 2020-12-07

## 2020-12-07 RX ORDER — HYDRALAZINE HYDROCHLORIDE 25 MG/1
25 TABLET, FILM COATED ORAL ONCE AS NEEDED
Status: DISCONTINUED | OUTPATIENT
Start: 2020-12-07 | End: 2020-12-07 | Stop reason: HOSPADM

## 2020-12-07 RX ORDER — AMLODIPINE BESYLATE 5 MG/1
5 TABLET ORAL
Qty: 30 TABLET | Refills: 0 | Status: SHIPPED | OUTPATIENT
Start: 2020-12-07 | End: 2021-01-06

## 2020-12-07 RX ORDER — AMLODIPINE BESYLATE 5 MG/1
5 TABLET ORAL
Status: DISCONTINUED | OUTPATIENT
Start: 2020-12-07 | End: 2020-12-07 | Stop reason: HOSPADM

## 2020-12-07 RX ADMIN — MULTIPLE VITAMINS W/ MINERALS TAB 1 TABLET: TAB at 10:02

## 2020-12-07 RX ADMIN — ASPIRIN 325 MG: 325 TABLET ORAL at 10:02

## 2020-12-07 RX ADMIN — SODIUM CHLORIDE, PRESERVATIVE FREE 10 ML: 5 INJECTION INTRAVENOUS at 10:02

## 2020-12-07 RX ADMIN — LOSARTAN POTASSIUM 100 MG: 100 TABLET, FILM COATED ORAL at 10:02

## 2020-12-07 RX ADMIN — AMLODIPINE BESYLATE 5 MG: 5 TABLET ORAL at 14:21

## 2020-12-07 RX ADMIN — Medication 1 CAPSULE: at 10:02

## 2020-12-07 RX ADMIN — CALCIUM CARBONATE-VITAMIN D TAB 500 MG-200 UNIT 1000 MG: 500-200 TAB at 10:02

## 2020-12-07 NOTE — PLAN OF CARE
Goal Outcome Evaluation:  Plan of Care Reviewed With: patient  Progress: improving  Outcome Summary: A/O, no complaints of pain, NIH 0, still needs MRI brain, need to follow up with Dr. Aquino to get information on aneurysm stent to see if metal is compatible with MRI, SBP high on monitor again tonight, will continue to monitor.

## 2020-12-07 NOTE — DISCHARGE SUMMARY
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       DETAILS OF HOSPITAL STAY     Pertinent Test Results and Procedures Performed    Head CT:  1.  Evidence of previous left frontotemporal craniotomy and apparent   aneurysm clipping.   2.  No acute process identified.     CT angiogram of the head and neck:  1. No hemodynamically significant focal stenosis, aneurysm, or   dissection in the cervical carotid or vertebral arteries or in the   arteries at the base of the brain.       2. No acute intracranial hemorrhage or hydrocephalus. No enhancing   lesion in brain. If there is further clinical concern, MRI could be   considered for further evaluation.     2D echocardiogram:  · Left ventricular wall thickness is consistent with mild septal asymmetric hypertrophy.  · Estimated left ventricular EF = 58% Left ventricular systolic function is normal.  · Left ventricular diastolic function was indeterminate.  · Saline test results are negative.  · Mild tricuspid valve regurgitation is present. Estimated right ventricular systolic pressure from tricuspid regurgitation is mildly elevated (35-45 mmHg). Calculated right ventricular systolic pressure from tricuspid regurgitation is 38 mmHg.    EEG:  This routine EEG recording is abnormal due to the presence of intermittent focal left temporal slowing.  The results of study may indicate an underlying area of cerebral dysfunction.  No potentially epileptic activity or seizure activity is present.  Careful clinical and radiographic correlation is  advised.    Hospital Course  This is a very pleasant 85-year-old female with previous left-sided craniotomy and aneurysm clip around 2000 who presented 12/4/20 with AMS.  Patient recently had 2 spells of disorientation.  During the first spell she was having difficulty operating a remote control and had difficulty with word finding and difficulty writing her Silke card.  She also reported a headache.  Those symptoms resolved over several hours.  The patienthad difficulty communicating and using her phone for several days.  She has no prior history of stroke.  Please see H&P for full details of admission.  Work-up was initiated as described above.  Neurology evaluated.  Unfortunately, paperwork could not be produced regarding her aneurysm clip and thus brain MRI could not be performed.  Neurology is aware of this and given the fact that they feel that her symptoms were most likely related to hypertensive encephalopathy they do not feel it pertinent for her to have a brain MRI at this point.  Her neurologic symptoms completely resolved at the time of her admission and have not recurred.  Blood pressure has been elevated throughout the course of her hospitalization but is now currently 164/84.  I am going to add Norvasc to her regimen of losartan today and we have asked her to follow-up with her primary care physician in 1 week for further blood pressure management.  She will be released home today.  I discussed all this with the patient today and she is very much in agreement and very eager to leave the hospital.    Physical Exam at Discharge:  General: No acute distress, AAOx3  HEENT: EOMI, PERRL  Cardiovascular: +s1 and s2, RRR  Lungs: No rhonchi or wheezing  Abdomen: soft, nontender    Consults:   Consults     Date and Time Order Name Status Description    12/4/2020 1947 Inpatient Neurology Consult Stroke Completed     12/4/2020 3353 [facility] (on-call MD unless specified) Details Completed             Condition on  Discharge: Stable    Discharge Disposition  Home or Self Care    Discharge Medications     Discharge Medications      New Medications      Instructions Start Date   amLODIPine 5 MG tablet  Commonly known as: NORVASC   5 mg, Oral, Every 24 Hours Scheduled         Changes to Medications      Instructions Start Date   albuterol sulfate  (90 Base) MCG/ACT inhaler  Commonly known as: ProAir HFA  What changed:   · when to take this  · reasons to take this   2 puffs 4 times daily         Continue These Medications      Instructions Start Date   betamethasone (augmented) 0.05 % ointment  Commonly known as: DIPROLENE   Topical      calcium-vitamin D 500-200 MG-UNIT per tablet  Commonly known as: OSCAL-500   2 tablets, Oral, Daily      Centrum Adults tablet tablet  Generic drug: multivitamin with minerals   1 tablet, Oral, Daily      fish oil 1000 MG capsule capsule   1,000 mg, Oral, Daily With Breakfast      losartan 100 MG tablet  Commonly known as: COZAAR   100 mg, Oral, Daily      Magnesium 300 MG capsule   1 tablet, Oral, Daily      meloxicam 15 MG tablet  Commonly known as: MOBIC   15 mg, Oral, Daily      nystatin 381585 UNIT/GM cream  Commonly known as: MYCOSTATIN   1 application, Topical, Daily      nystatin 826921 UNIT/GM powder  Commonly known as: MYCOSTATIN   Topical, Daily      PROBIOTIC ADVANCED PO   1 tablet, Oral, Daily             Discharge Diet:   Diet Instructions     Diet: Regular, Consistent Carbohydrate, Cardiac      Discharge Diet:  Regular  Consistent Carbohydrate  Cardiac             Activity at Discharge:   Activity Instructions     Activity as Tolerated            Follow-up Appointments  No future appointments.  Additional Instructions for the Follow-ups that You Need to Schedule     Discharge Follow-up with PCP   As directed       Currently Documented PCP:    Grupo Lackey MD    PCP Phone Number:    [phone]     Follow Up Details: 1 week             I have examined and discussed  discharge planning with the patient today.    I wore full protective equipment throughout the patient encounter including eye protection and facemask.  Hand hygiene was performed before donning protective equipment and after removal when leaving the room.     Gray Hall MD  12/07/20  13:21 EST    Time: Discharge 24 min

## 2020-12-07 NOTE — OUTREACH NOTE
Prep Survey      Responses   [facility] patient discharged from?  [place]   Is LACE score < 7 ?  Yes   Eligibility  [facility]   Date of Admission  12/04/20   Date of Discharge  12/07/20   Discharge Disposition  Home or Self Care   Discharge diagnosis  Cognitive impairment   Does the patient have one of the following disease processes/diagnoses(primary or secondary)?  Other   Does the patient have Home health ordered?  No   Is there a DME ordered?  No   Prep survey completed?  Yes          Hiral Ortega RN

## 2020-12-07 NOTE — PROGRESS NOTES
"DOS: 2020  NAME: Gladys Izquierdo   : 1935  PCP: Grupo Lackey MD  Chief Complaint   Patient presents with   • Memory Loss     Stroke    Subjective:  Patient states she's at her baseline. Wants to go home. Denies headache or speech difficulty.    Objective:  Vital signs: BP (!) 197/99 (BP Location: Right arm, Patient Position: Sitting)   Pulse 105   Temp 97.6 °F (36.4 °C) (Oral)   Resp 20   Ht 154.9 cm (61\")   Wt 69.9 kg (154 lb)   SpO2 93%   BMI 29.10 kg/m²       General appearance: Well developed, well nourished, well groomed, alert and cooperative. Obese.   HEENT: Normocephalic.   Neck and spine: Normal range of motion. Normal alignment. No mass or tenderness.    Cardiac: Regular rate and rhythm. No murmurs.   Peripheral Vasculature: Radial pulses are equal and symmetric.  Chest Exam: Clear to auscultation bilaterally, no wheezes, no rhonchi.  Extremities: Normal, no edema.   Skin: No rashes or birthmarks.      Higher integrative function: Oriented to time, place, person, intact recent and remote memory, attention span, concentration and language. Spontaneous speech, fund of vocabulary are normal.   CN II: Normal visual fields.   CN III IV VI: Extraocular movements are full without nystagmus. Pupils are equal, round, and reactive to light.   CN V: Normal facial sensation.  CN VII: Facial movements are symmetric, no weakness.   CN VIII: Auditory acuity is normal.   CN IX & X: Symmetric palatal movement.   CN XI: Sternocleidomastoid and trapezius are normal. No weakness.   CN XII: The tongue is midline. .   Motor: Normal muscle strength, bulk, and tone in upper and lower extremities. No fasciculations, rigidity, spasticity or abnormal movements.   Sensation: LT intact/symmetric.  Station and gait: Normal.   Coordination: Finger to nose test showed no dysmetria.   Patient reexamined, changes noted.    Scheduled Meds:amLODIPine, 5 mg, Oral, Q24H  aspirin, 325 mg, Oral, Daily    Or  aspirin, 300 " mg, Rectal, Daily  atorvastatin, 80 mg, Oral, Nightly  calcium-vitamin D, 1,000 mg, Oral, Daily  lactobacillus acidophilus, 1 capsule, Oral, Daily  losartan, 100 mg, Oral, Daily  multivitamin with minerals, 1 tablet, Oral, Daily  nystatin, , Topical, Daily  sodium chloride, 10 mL, Intravenous, Q12H      Continuous Infusions:   PRN Meds:.•  acetaminophen  •  albuterol  •  hydrALAZINE  •  labetalol  •  ondansetron  •  [COMPLETED] Insert peripheral IV **AND** sodium chloride  •  sodium chloride    Laboratory results:  Lab Results   Component Value Date    GLUCOSE 89 12/06/2020    CALCIUM 8.8 12/06/2020     12/06/2020    K 4.2 12/06/2020    CO2 24.2 12/06/2020     (H) 12/06/2020    BUN 18 12/06/2020    CREATININE 0.85 12/06/2020    EGFRIFAFRI 71 10/21/2019    EGFRIFNONA 64 12/06/2020    BCR 21.2 12/06/2020    ANIONGAP 9.8 12/06/2020     Lab Results   Component Value Date    WBC 7.09 12/06/2020    HGB 11.2 (L) 12/06/2020    HCT 34.1 12/06/2020    MCV 96.3 12/06/2020     12/06/2020     Lab Results   Component Value Date    CHOL 172 12/05/2020     Lab Results   Component Value Date    HDL 80 (H) 12/05/2020    HDL 87 (H) 10/23/2017    HDL 92 (H) 08/04/2016     Lab Results   Component Value Date    LDL 80 12/05/2020    LDL 89 10/23/2017     (H) 08/04/2016     Lab Results   Component Value Date    TRIG 62 12/05/2020    TRIG 81 10/23/2017    TRIG 62 08/04/2016     Results from last 7 days   Lab 12/05/20  0456   HEMOGLOBIN A1C 5.40      Date of onset: December 7, 2020 at 8:31 AM  Date of offset: December 7, 2020 at 8:57 AM     Indication: Altered mental status     Technical description:  This is a 21 channel digital EEG recording that began on December 7, 2020 at 8:31 AM and ended on December 7, 2020 8:57 AM.  Dl and seizure detection software programs were used.     Background:  Up to 10 Hz alpha activity is present over the posterior head regions that symmetric, well formed and reactive to eye  closure.  There is intermittent focal left temporal slowing with polymorphic delta activity.  The patient enters the drowsy state, but does not sleep during the record.  There is no abnormal activation with photic stimulation.  Hyperventilation was not performed.  No interictal activity is present.     The EKG monitor shows a heart rate that varies between 95 and 100 beats per minute.     Clinical Interpretation:  This routine EEG recording is abnormal due to the presence of intermittent focal left temporal slowing.  The results of study may indicate an underlying area of cerebral dysfunction.  No potentially epileptic activity or seizure activity is present.  Careful clinical and radiographic correlation is advised.             Review and interpretation of imaging:  CT ANGIOGRAM HEAD-, CT ANGIOGRAM NECK-     INDICATIONS: Aphasia     TECHNIQUE: Radiation dose reduction techniques were utilized, including  automated exposure control and exposure modulation based on body  size.Noncontrast head CT was performed, followed by enhanced CT  angiography of the head and neck. Three-dimensional reconstructions were  created, reviewed, and assessed according to NASCET criteria.     COMPARISON: Noncontrast head CT from 12/04/2020     FINDINGS:     No acute intracranial hemorrhage, midline shift or mass effect. No acute  territorial infarct is identified.     Mild periventricular hypodensity suggests chronic small vessel ischemic  change in a patient this age.     No enhancing lesions are noted following contrast material  administration.     Arterial calcifications are seen at the base of the brain. Aneurysm clip  is noted in the region of the left carotid terminus. Left frontotemporal  craniotomy change is redemonstrated.     Ventricles, cisterns, cerebral sulci are unremarkable for patient age.     Small mucosal thickening is seen in paranasal sinuses. The visualized  paranasal sinuses, orbits, mastoid air cells are otherwise  unremarkable.        The CT angiography images show no hemodynamically significant focal  stenosis, aneurysm, or dissection in the cervical carotid or vertebral  arteries, or in the arteries at the base of the brain. Scattered carotid  arterial calcifications are seen without high-grade stenosis (0-49%  stenosis). The left, carotid artery is partly retropharyngeal in  location.                 IMPRESSION:  1. No hemodynamically significant focal stenosis, aneurysm, or  dissection in the cervical carotid or vertebral arteries or in the  arteries at the base of the brain.     2. No acute intracranial hemorrhage or hydrocephalus. No enhancing  lesion in brain. If there is further clinical concern, MRI could be  considered for further evaluation.     This report was finalized on 12/6/2020 12:42 PM by Dr. Sukhi Funk M.D.       Impression:  This patient is an 85-year-old female with previous left-sided craniotomy and aneurysm clip around 2000 by  who presented 12/4 with AMS.  Patient recently had 2 spells of disorientation, on Wednesday and Thursday.  During the first spell she was having difficulty operating a remote control and had difficulty with word finding and difficulty writing her Silke card.  She is also reported a headache.  Those symptoms resolved over several hours.  The patient is also had difficulty communicating and using her phone for several days.  Patient denies a history of stroke or seizures.  BP in /117, NIHSS 0, T 96, and ST, and CT head showed postop changes.  She was not on an AED, aspirin, or statin prior to arrival.     Work-up:  2D echo: EF 58%, saline test results negative, normal left atrial cavity size, mild tricuspid regurgitation.  EEG 12/7: Intermittent focal left temporal slowing, no potentially epileptic activity or seizure activity.  CTA head/neck: No hemodynamically significant focal stenosis, aneurysm, or dissection.  Labs: Hemoglobin A1c 5.40%, LDL 80,  magnesium 2.6, troponin less than 0.10, UA unremarkable, COVID-19 testing negative 12/4.  TSH 8.5.     Diagnoses: Recurrent AMS, aphasia, and headache in the setting of hypertension, favor hypertensive encephalopathy        Plan:  Unable to get MRI as paperwork on aneurysm clipping cannot be produced.  Patient discussed with Dr. Rajput today.  Okay for DC from neurology standpoint.  Patient to follow-up as needed.    The above impression statement reviewed and changes noted.

## 2020-12-07 NOTE — SIGNIFICANT NOTE
12/07/20 0806   OTHER   Discipline speech language pathologist   Rehab Time/Intention   Session Not Performed other (see comments)  (Pt passed RN swallow screen and is tolerating a diet. Per chart review, patient reports back to baseline. SLP to sign off. Please re consult if indicated.)

## 2020-12-07 NOTE — PROGRESS NOTES
Continued Stay Note  [facility]     Patient Name: Gladys Izquierdo  MRN: [id number]  Today's Date: 12/7/2020    Admit Date: 12/4/2020    Discharge Plan     Row Name 12/07/20 1539       Plan    Plan  Home via daughter to transport.    Patient/Family in Agreement with Plan  yes    Plan Comments  moon notice given. DC orders noted. Spoke with pt at bedside, introduced self and explained CCP role. She states she is going home and her daughter is on her way to transport her home. She denies any dc needs or concerns. mark darden/ccp        Discharge Codes    No documentation.       Expected Discharge Date and Time     Expected Discharge Date Expected Discharge Time    Dec 7, 2020             Caty Boss, RN

## 2020-12-08 ENCOUNTER — TRANSITIONAL CARE MANAGEMENT TELEPHONE ENCOUNTER (OUTPATIENT)
Dept: CALL CENTER | Facility: HOSPITAL | Age: 85
End: 2020-12-08

## 2020-12-08 NOTE — OUTREACH NOTE
Call Center TCM Note      Responses   [facility] patient discharged from?  [facility]   Does the patient have one of the following disease processes/diagnoses(primary or secondary)?  Other   TCM attempt successful?  Yes   Call start time  1348   Call end time  1408   Discharge diagnosis  Cognitive impairment   Meds reviewed with patient/caregiver?  Yes   Is the patient having any side effects they believe may be caused by any medication additions or changes?  No   Does the patient have all medications ordered at discharge?  Yes   Is the patient taking all medications as directed (includes completed medication regime)?  Yes   Does the patient have a primary care provider?   Yes   Does the patient have an appointment with their PCP within 7 days of discharge?  Yes   Comments regarding PCP  Hospital d/c f/u is on 12/11/20 at 11:00 with Christa Queen   Has the patient kept scheduled appointments due by today?  N/A   Psychosocial issues?  No   Did the patient receive a copy of their discharge instructions?  Yes   Nursing interventions  Reviewed instructions with patient   What is the patient's perception of their health status since discharge?  Same   Is the patient/caregiver able to teach back signs and symptoms related to disease process for when to call PCP?  Yes   Is the patient/caregiver able to teach back signs and symptoms related to disease process for when to call 911?  Yes   Is the patient/caregiver able to teach back the hierarchy of who to call/visit for symptoms/problems? PCP, Specialist, Home health nurse, Urgent Care, ED, 911  Yes   If the patient is a current smoker, are they able to teach back resources for cessation?  Not a smoker   TCM call completed?  Yes          Santa Puentes RN    12/8/2020, 14:08 EST

## 2020-12-08 NOTE — PROGRESS NOTES
Case Management Discharge Note      Final Note: Home no additional dc orders noted. Irineo DE LA ROSA/CCP         Selected Continued Care - Discharged on 12/7/2020 Admission date: 12/4/2020 - Discharge disposition: Home or Self Care    Destination    No services have been selected for the patient.              Durable Medical Equipment    No services have been selected for the patient.              Dialysis/Infusion    No services have been selected for the patient.              Home Medical Care    No services have been selected for the patient.              Therapy    No services have been selected for the patient.              Community Resources    No services have been selected for the patient.                  Transportation Services  Private: Car    Final Discharge Disposition Code: 01 - home or self-care

## 2020-12-11 ENCOUNTER — OFFICE VISIT (OUTPATIENT)
Dept: INTERNAL MEDICINE | Facility: CLINIC | Age: 85
End: 2020-12-11

## 2020-12-11 VITALS
DIASTOLIC BLOOD PRESSURE: 78 MMHG | HEIGHT: 61 IN | SYSTOLIC BLOOD PRESSURE: 122 MMHG | WEIGHT: 156 LBS | BODY MASS INDEX: 29.45 KG/M2 | TEMPERATURE: 98.1 F | HEART RATE: 96 BPM | OXYGEN SATURATION: 98 %

## 2020-12-11 DIAGNOSIS — I10 ESSENTIAL HYPERTENSION: ICD-10-CM

## 2020-12-11 DIAGNOSIS — Z09 HOSPITAL DISCHARGE FOLLOW-UP: Primary | ICD-10-CM

## 2020-12-11 DIAGNOSIS — I67.4 HYPERTENSIVE ENCEPHALOPATHY: ICD-10-CM

## 2020-12-11 PROCEDURE — 99496 TRANSJ CARE MGMT HIGH F2F 7D: CPT | Performed by: NURSE PRACTITIONER

## 2020-12-11 NOTE — PROGRESS NOTES
Transitional Care Follow Up Visit  Subjective     Gladys Izquierdo is a 85 y.o. female who presents for a transitional care management visit.    Within 48 business hours after discharge our office contacted her via telephone to coordinate her care and needs.      I reviewed and discussed the details of that call along with the discharge summary, hospital problems, inpatient lab results, inpatient diagnostic studies, and consultation reports with Gladys.     Current outpatient and discharge medications have been reconciled for the patient.  Reviewed by: MARCI Bean      Date of TCM Phone Call 12/7/2020   [facility]   Date of Admission 12/4/2020   Date of Discharge 12/7/2020   Discharge Disposition Home or Self Care     Risk for Readmission (LACE) Score: 6 (12/7/2020  6:00 AM)      History of Present Illness   Course During Hospital Stay: Patient presented to the ER with altered mental status and disorientation. Patient recently had 2 spells of disorientation.  During the first spell she was having difficulty operating a remote control and had difficulty with word finding and difficulty writing her Silke card.  She also reported a headache.  Those symptoms resolved over several hours.  The patienthad difficulty communicating and using her phone for several days.  Patient did have history of left-sided craniotomy with aneurysm clip in 2000 and therefore an MRI could not be performed during her admit to the hospital.  Neurology was consulted and felt that her symptoms were a result of hypertensive encephalopathy since her blood pressure was in the 180s/100s on admit.  At the time of her admit all her symptoms had resolved and since her discharge no symptoms have reappeared.  On discharge her blood pressure was 164/84 and today she is well controlled.  Was started on amlodipine 5 mg daily while in the hospital, this was in addition to losartan 100 mg daily that she was previously taking  for hypertension.    Patient reports that she has not been nearly as active as she usually is during the pandemic and has not been eating the healthiest diet.  This could help explain the increase in her blood pressure.     The following portions of the patient's history were reviewed and updated as appropriate: allergies, current medications, past family history, past medical history, past social history, past surgical history and problem list.    Review of Systems   Constitutional: Negative for fatigue and fever.   Respiratory: Negative for cough and shortness of breath.    Cardiovascular: Negative for chest pain and palpitations.   Neurological: Negative for dizziness, weakness, light-headedness and headaches.       Objective   Physical Exam  Vitals signs reviewed.   Constitutional:       Appearance: She is well-developed.   HENT:      Head: Normocephalic and atraumatic.   Cardiovascular:      Rate and Rhythm: Normal rate and regular rhythm.      Heart sounds: Normal heart sounds. No murmur.   Pulmonary:      Effort: Pulmonary effort is normal. No respiratory distress.      Breath sounds: Normal breath sounds.   Musculoskeletal: Normal range of motion.   Skin:     General: Skin is warm and dry.   Neurological:      Mental Status: She is alert.   Psychiatric:         Behavior: Behavior normal.         Thought Content: Thought content normal.         Judgment: Judgment normal.         Assessment/Plan   Diagnoses and all orders for this visit:    1. Hospital discharge follow-up (Primary)    2. Essential hypertension  Comments:  Well-controlled today continue regimen of amlodipine and losartan    3. Hypertensive encephalopathy  Comments:  Resolved, denies further altered mental status symptoms since discharge      No changes to medications.  I do believe that her symptoms were probably caused by elevated blood pressure.  Her blood pressure in office today is great- continue the amlodipine that was started in the  hospital in addition to the losartan that she previously was taking.    Her daughter accompanies her today and reports patient is doing very well and has not had further symptoms that she has noticed.    We did discuss extensively increasing her activity, eating a healthy diet, and taking good care of herself.    Return for worsening of sx present to the ER for any strokelike symptoms.    She will return in 4 months for annual wellness visit.

## 2020-12-21 RX ORDER — ALBUTEROL SULFATE 90 UG/1
AEROSOL, METERED RESPIRATORY (INHALATION)
Qty: 8.5 G | Refills: 2 | Status: SHIPPED | OUTPATIENT
Start: 2020-12-21

## 2020-12-21 NOTE — TELEPHONE ENCOUNTER
Patient is requesting a refill on albuterol sulfate HFA (ProAir HFA) 108 (90 Base) MCG/ACT inhaler    [facility] - [place], [place] - [facility] Marlo Gomez - [phone]  - [phone] FX

## 2020-12-31 ENCOUNTER — TELEPHONE (OUTPATIENT)
Dept: INTERNAL MEDICINE | Facility: CLINIC | Age: 85
End: 2020-12-31

## 2020-12-31 NOTE — TELEPHONE ENCOUNTER
Patient states she was in the hospital a few weeks ago and had several tests run. She states she was sent home and told to take Amlodipine instead of Losartan for 30 days. She states the amlodipine is causing her to retain fluid and swell. She would like to know if this is normal. She also wants to know if after 30 days she is to stop this medicine and go back on Losartan.    Please advise patient at [phone].

## 2021-01-04 NOTE — TELEPHONE ENCOUNTER
PT STATES SHE IS STILL TAKING THE LOSARTAN, SHE IS NOW ONLY TAKING HALF OF THE AMLODIPINE SINCE SHE HAS STARTED TO RETAIN FLUID. WOULD YOU LIKE HER TO CONTINUE TAKING HALF OR TO STOP TAKING THE AMLODIPINE?     PLEASE ADVISE.

## 2021-01-06 ENCOUNTER — TRANSCRIBE ORDERS (OUTPATIENT)
Dept: ADMINISTRATIVE | Facility: HOSPITAL | Age: 86
End: 2021-01-06

## 2021-01-06 DIAGNOSIS — Z12.31 SCREENING MAMMOGRAM, ENCOUNTER FOR: Primary | ICD-10-CM

## 2021-01-11 ENCOUNTER — TELEPHONE (OUTPATIENT)
Dept: INTERNAL MEDICINE | Facility: CLINIC | Age: 86
End: 2021-01-11

## 2021-01-11 RX ORDER — HYDROCHLOROTHIAZIDE 12.5 MG/1
12.5 TABLET ORAL DAILY
Qty: 90 TABLET | Refills: 3 | Status: SHIPPED | OUTPATIENT
Start: 2021-01-11 | End: 2021-01-28

## 2021-01-11 NOTE — TELEPHONE ENCOUNTER
Spoke with patient, she has been getting BP of 140/90, 160/90 and 160/98. Patient takes losartan 100 mg once daily. She can not take amlodipine due to swelling and fluid retention reactions in past. Please advise what patient should take in addition to losartan.

## 2021-01-11 NOTE — TELEPHONE ENCOUNTER
Caller: Gladys Izquierdo    Relationship to patient: Self    Best call back number: [phone]    Patient is needing: PT WAS HOSPITALIZED WITH HIGH BP. HOSPITAL PRESCRIBED AMLODIPINE AND IT CALLED HER FEET AND LEGS TO SWELL. PT WANTED TO KNOW WHY  AT HOSPITAL AND WHY DR. GARZON TOOK HER OFF IT. HER BP THE LAST 3 DAYS HAS BEEN RUNNING HIGH. PT ASKED FOR CALL BACK TODAY.    PT LEAVING TOWN ON 1/14/21 AND WANTED TO HAVE SOMETHING PRESCRIBED BEFORE SHE LEAVES.

## 2021-01-12 NOTE — TELEPHONE ENCOUNTER
I spoke to Ms. Izquierdo and informed her that Dr. Lackey added HCTZ 12.5mg to also take with her Losartan 100 mg.     She asked if she had any problems while out of town what should she do. I told her she can go to an urgent care or ER for evaluation/treatment.     She verbalized she understood and thank you.

## 2021-01-18 ENCOUNTER — TELEPHONE (OUTPATIENT)
Dept: INTERNAL MEDICINE | Facility: CLINIC | Age: 86
End: 2021-01-18

## 2021-01-18 DIAGNOSIS — I10 ESSENTIAL HYPERTENSION: Primary | ICD-10-CM

## 2021-01-18 DIAGNOSIS — I67.4 HYPERTENSIVE ENCEPHALOPATHY: ICD-10-CM

## 2021-01-18 NOTE — TELEPHONE ENCOUNTER
PT IS CALLING IN TO LEAVE A MESSAGE FOR MARLO BARFIELD.  PT STATES THAT SHE WAS IN THE HOSPITAL ON 12/04 AND WAS PUT ON A MEDICATION BUT DR TALLEY STOPPED HER FROM TAKING IT.  PT FEELS LIKE SHE NEEDS TO SEE A CARDIOLOGIST AND WANTS TO BE CALLED BY MARLO TO DISCUSS THIS.      PT CALL BACK  [phone]

## 2021-01-19 ENCOUNTER — TELEPHONE (OUTPATIENT)
Dept: INTERNAL MEDICINE | Facility: CLINIC | Age: 86
End: 2021-01-19

## 2021-01-28 ENCOUNTER — OFFICE VISIT (OUTPATIENT)
Dept: CARDIOLOGY | Facility: CLINIC | Age: 86
End: 2021-01-28

## 2021-01-28 VITALS
HEART RATE: 103 BPM | BODY MASS INDEX: 29.45 KG/M2 | HEIGHT: 61 IN | WEIGHT: 156 LBS | DIASTOLIC BLOOD PRESSURE: 104 MMHG | SYSTOLIC BLOOD PRESSURE: 202 MMHG

## 2021-01-28 DIAGNOSIS — I10 ESSENTIAL HYPERTENSION: Primary | ICD-10-CM

## 2021-01-28 DIAGNOSIS — I67.4 HYPERTENSIVE ENCEPHALOPATHY: ICD-10-CM

## 2021-01-28 PROCEDURE — 93000 ELECTROCARDIOGRAM COMPLETE: CPT | Performed by: INTERNAL MEDICINE

## 2021-01-28 PROCEDURE — 99204 OFFICE O/P NEW MOD 45 MIN: CPT | Performed by: INTERNAL MEDICINE

## 2021-01-28 RX ORDER — CHLORTHALIDONE 50 MG/1
50 TABLET ORAL DAILY
Qty: 30 TABLET | Refills: 6 | Status: SHIPPED | OUTPATIENT
Start: 2021-01-28

## 2021-01-28 NOTE — PROGRESS NOTES
Gladys Izquierdo  1935  Date of Office Visit: 01/28/21  Encounter Provider: Rolf Cohen MD  Place of Service: [facility] CARDIOLOGY      CHIEF COMPLAINT:  Essential hypertension: Not at goal  History of hypertensive encephalopathy      HISTORY OF PRESENT ILLNESS:Dear Dr. Lackey,    I had the pleasure of seeing your patient in consultation today.  She is a very pleasant 86-year-old female with a medical history of essential hypertension and actually an issue with hypertensive encephalopathy who presents to me for evaluation of hypertension.  She has been on Losartan for a prolonged period of time but actually back in 12/2020 she presented with altered mental status and underwent a thorough neurologic evaluation.  It was felt that she had hypertensive encephalopathy.  She was started on Norvasc along with her Losartan but it sounds like she got confused and only started taking the amlodipine and discontinued the Losartan.  She noticed lower extremity edema and an elevated blood pressure and subsequently went back on the Losartan and has discontinued the amlodipine therapy.  She was started on low dose hydrochlorothiazide but her blood pressure here today is still severely elevated at 202/104.  This has been rechecked and checked on both arms.    She states that she has been monitoring this at home and most of her blood pressures are 140-150 mmHg systolic.  She is not sure if that cuff is accurate.  She denies any additional cognitive impairment or neurologic symptoms.  She denies chest pain.  She did undergo a transthoracic echocardiogram at the time of her hospital admission that just showed mild asymmetric septal hypertrophy but overall normal systolic function.                Review of Systems   Constitution: Negative for fever and malaise/fatigue.   HENT: Negative for nosebleeds and sore throat.    Eyes: Negative for blurred vision and double vision.   Cardiovascular:  Negative for chest pain, claudication, palpitations and syncope.   Respiratory: Negative for cough, shortness of breath and snoring.    Endocrine: Negative for cold intolerance, heat intolerance and polydipsia.   Skin: Negative for itching, poor wound healing and rash.   Musculoskeletal: Negative for joint pain, joint swelling, muscle weakness and myalgias.   Gastrointestinal: Negative for abdominal pain, melena, nausea and vomiting.   Neurological: Negative for light-headedness, loss of balance, seizures, vertigo and weakness.   Psychiatric/Behavioral: Negative for altered mental status and depression.          Past Medical History:   Diagnosis Date   • Aphasia    • Asthma, cold induced    • Cognitive impairment    • DDD (degenerative disc disease), thoracolumbar    • Essential hypertension    • Hypertensive encephalopathy    • Psoriasis        The following portions of the patient's history were reviewed and updated as appropriate: Social history , Family history and Surgical history     Current Outpatient Medications on File Prior to Visit   Medication Sig Dispense Refill   • albuterol sulfate HFA (ProAir HFA) 108 (90 Base) MCG/ACT inhaler 2 puffs 4 times daily 8.5 g 2   • betamethasone, augmented, (DIPROLENE) 0.05 % ointment Apply  topically to the appropriate area as directed.     • calcium-vitamin D (OSCAL-500) 500-200 MG-UNIT per tablet Take 2 tablets by mouth Daily.     • hydroCHLOROthiazide (HYDRODIURIL) 12.5 MG tablet Take 1 tablet by mouth Daily. 90 tablet 3   • losartan (COZAAR) 100 MG tablet Take 1 tablet by mouth Daily. 90 tablet 3   • Magnesium 300 MG capsule Take 1 tablet by mouth Daily.     • meloxicam (MOBIC) 15 MG tablet Take 1 tablet by mouth Daily. 90 tablet 3   • Multiple Vitamins-Minerals (CENTRUM ADULTS) tablet Take 1 tablet by mouth Daily.     • nystatin (MYCOSTATIN) 822409 UNIT/GM powder Apply  topically to the appropriate area as directed Daily. 60 g 3   • Omega-3 Fatty Acids (FISH OIL)  "1000 MG capsule capsule Take 1,000 mg by mouth Daily With Breakfast.     • Probiotic Product (PROBIOTIC ADVANCED PO) Take 1 tablet by mouth Daily.     • [DISCONTINUED] nystatin (MYCOSTATIN) 921312 UNIT/GM cream Apply 1 application topically to the appropriate area as directed Daily. 30 g 4     No current facility-administered medications on file prior to visit.        Allergies   Allergen Reactions   • Demerol [Meperidine]      B/P problems   • Latex      blisters   • Morphine And Related      Decreased b/p   • Dilaudid [Hydromorphone Hcl] Rash   • Levofloxacin Palpitations       Vitals:    01/28/21 0903   BP: (!) 202/104   Pulse: 103   Weight: 70.8 kg (156 lb)   Height: 154.9 cm (61\")     Constitutional:       Appearance: Well-developed.   Eyes:      General: No scleral icterus.     Conjunctiva/sclera: Conjunctivae normal.   HENT:      Head: Normocephalic and atraumatic.   Neck:      Musculoskeletal: Normal range of motion and neck supple.      Thyroid: No thyromegaly.      Vascular: Normal carotid pulses. No carotid bruit, hepatojugular reflux or JVD.      Trachea: No tracheal deviation.   Pulmonary:      Effort: No respiratory distress.      Breath sounds: Normal breath sounds. No decreased breath sounds. No wheezing. No rhonchi. No rales.   Chest:      Chest wall: Not tender to palpatation.   Cardiovascular:      Normal rate. Regular rhythm.      No gallop.   Pulses:     Carotid: 2+ bilaterally.     Radial: 2+ bilaterally.     Femoral: 2+ bilaterally.     Dorsalis pedis: 2+ bilaterally.     Posterior tibial: 2+ bilaterally.  Edema:     Peripheral edema absent.   Abdominal:      General: Bowel sounds are normal. There is no distension.      Palpations: Abdomen is soft.      Tenderness: There is no abdominal tenderness. There is no rebound.   Musculoskeletal:         General: No deformity.   Skin:     Findings: No erythema or rash.   Neurological:      Mental Status: Alert and oriented to person, place, and time. "      Sensory: No sensory deficit.   Psychiatric:         Behavior: Behavior normal.         Lab Results   Component Value Date    WBC 7.09 12/06/2020    HGB 11.2 (L) 12/06/2020    HCT 34.1 12/06/2020    MCV 96.3 12/06/2020     12/06/2020       Lab Results   Component Value Date    GLUCOSE 89 12/06/2020    BUN 18 12/06/2020    CREATININE 0.85 12/06/2020    EGFRIFNONA 64 12/06/2020    EGFRIFAFRI 71 10/21/2019    BCR 21.2 12/06/2020    K 4.2 12/06/2020    CO2 24.2 12/06/2020    CALCIUM 8.8 12/06/2020    PROTENTOTREF 7.2 10/21/2019    ALBUMIN 4.00 12/05/2020    LABIL2 2.0 10/21/2019    AST 24 12/05/2020    ALT 14 12/05/2020       Lab Results   Component Value Date    GLUCOSE 89 12/06/2020    CALCIUM 8.8 12/06/2020     12/06/2020    K 4.2 12/06/2020    CO2 24.2 12/06/2020     (H) 12/06/2020    BUN 18 12/06/2020    CREATININE 0.85 12/06/2020    EGFRIFAFRI 71 10/21/2019    EGFRIFNONA 64 12/06/2020    BCR 21.2 12/06/2020    ANIONGAP 9.8 12/06/2020       Lab Results   Component Value Date    CHOL 172 12/05/2020    CHLPL 192 10/23/2017    TRIG 62 12/05/2020    HDL 80 (H) 12/05/2020    LDL 80 12/05/2020         ECG 12 Lead    Date/Time: 1/28/2021 10:09 AM  Performed by: Rolf Cohen MD  Authorized by: Rolf Cohen MD   Comparison: compared with previous ECG from 12/4/2020  Similar to previous ECG  Rhythm: sinus tachycardia  Rate: tachycardic    Clinical impression: non-specific ECG             Results for orders placed during the hospital encounter of 12/04/20   Adult transthoracic echo complete    Narrative · Left ventricular wall thickness is consistent with mild septal   asymmetric hypertrophy.  · Estimated left ventricular EF = 58% Left ventricular systolic function   is normal.  · Left ventricular diastolic function was indeterminate.  · Saline test results are negative.  · Mild tricuspid valve regurgitation is present. Estimated right   ventricular systolic pressure from tricuspid  regurgitation is mildly   elevated (35-45 mmHg). Calculated right ventricular systolic pressure from   tricuspid regurgitation is 38 mmHg.          DISCUSSION/SUMMARY  A very pleasant 86-year-old female with a medical history of hypertension that is poorly controlled currently and hypertensive encephalopathy in the past, presents to me for evaluation. Thankfully she is doing well at this time but still has a very elevated blood pressure. She does not have significant evidence of end organ damage in looking at her echocardiogram. She has only mild asymmetric septal hypertrophy and everything else looks great with her echo.    Her blood pressure is currently poorly controlled. She did not tolerate amlodipine therapy with swelling. I think she would probably tolerate carvedilol but I do worry a little bit with fatigue with her, with her advanced age. I do like the diuretic class antihypertensives therapy option that has been started but I think that it is reasonable for us to go ahead and move that to chlorthalidone as this is a more effective diuretic class antihypertensive with a better duration of action.    I will put her on 50 mg of chlorthalidone and stop her hydrochlorothiazide. Continue the losartan. I will get repeat lab work on her in 2 weeks and she will call me back in 2 weeks. I have also recommended that she bring her blood pressure cuff in and have us check that against our cuff to ensure accuracy.

## 2021-03-02 DIAGNOSIS — Z23 IMMUNIZATION DUE: ICD-10-CM

## 2021-03-30 ENCOUNTER — APPOINTMENT (OUTPATIENT)
Dept: CT IMAGING | Facility: HOSPITAL | Age: 86
End: 2021-03-30

## 2021-03-30 ENCOUNTER — HOSPITAL ENCOUNTER (EMERGENCY)
Facility: HOSPITAL | Age: 86
Discharge: HOME | End: 2021-03-30
Attending: EMERGENCY MEDICINE | Admitting: EMERGENCY MEDICINE

## 2021-03-30 VITALS
RESPIRATION RATE: 16 BRPM | OXYGEN SATURATION: 94 % | BODY MASS INDEX: 34.71 KG/M2 | HEIGHT: 55 IN | DIASTOLIC BLOOD PRESSURE: 85 MMHG | SYSTOLIC BLOOD PRESSURE: 149 MMHG | HEART RATE: 96 BPM | TEMPERATURE: 96.3 F | WEIGHT: 150 LBS

## 2021-03-30 DIAGNOSIS — S09.90XA CLOSED HEAD INJURY, INITIAL ENCOUNTER: ICD-10-CM

## 2021-03-30 DIAGNOSIS — E86.0 MILD DEHYDRATION: ICD-10-CM

## 2021-03-30 DIAGNOSIS — S00.03XA HEMATOMA OF SCALP, INITIAL ENCOUNTER: ICD-10-CM
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
## 2021-03-30 LAB
ALBUMIN SERPL-MCNC: 4.3 G/DL (ref 3.5–5.2)
ALBUMIN/GLOB SERPL: 1.8 G/DL
ALP SERPL-CCNC: 68 U/L (ref 39–117)
ALT SERPL W P-5'-P-CCNC: 12 U/L (ref 1–33)
ANION GAP SERPL CALCULATED.3IONS-SCNC: 9.5 MMOL/L (ref 5–15)
AST SERPL-CCNC: 19 U/L (ref 1–32)
BACTERIA UR QL AUTO: ABNORMAL /HPF
BASOPHILS # BLD AUTO: 0.1 10*3/MM3 (ref 0–0.2)
BASOPHILS NFR BLD AUTO: 1.5 % (ref 0–1.5)
BILIRUB SERPL-MCNC: 0.2 MG/DL (ref 0–1.2)
BILIRUB UR QL STRIP: NEGATIVE
BUN SERPL-MCNC: 35 MG/DL (ref 8–23)
BUN/CREAT SERPL: 25.5 (ref 7–25)
CALCIUM SPEC-SCNC: 9.3 MG/DL (ref 8.6–10.5)
CHLORIDE SERPL-SCNC: 104 MMOL/L (ref 98–107)
CLARITY UR: CLEAR
CO2 SERPL-SCNC: 28.5 MMOL/L (ref 22–29)
COLOR UR: YELLOW
CREAT SERPL-MCNC: 1.37 MG/DL (ref 0.57–1)
DEPRECATED RDW RBC AUTO: 38.5 FL (ref 37–54)
EOSINOPHIL # BLD AUTO: 0.51 10*3/MM3 (ref 0–0.4)
EOSINOPHIL NFR BLD AUTO: 7.9 % (ref 0.3–6.2)
ERYTHROCYTE [DISTWIDTH] IN BLOOD BY AUTOMATED COUNT: 11.3 % (ref 12.3–15.4)
GFR SERPL CREATININE-BSD FRML MDRD: 37 ML/MIN/1.73
GLOBULIN UR ELPH-MCNC: 2.4 GM/DL
GLUCOSE SERPL-MCNC: 97 MG/DL (ref 65–99)
GLUCOSE UR STRIP-MCNC: NEGATIVE MG/DL
HCT VFR BLD AUTO: 37.2 % (ref 34–46.6)
HGB BLD-MCNC: 12.4 G/DL (ref 12–15.9)
HGB UR QL STRIP.AUTO: NEGATIVE
HOLD SPECIMEN: NORMAL
HOLD SPECIMEN: NORMAL
HYALINE CASTS UR QL AUTO: ABNORMAL /LPF
IMM GRANULOCYTES # BLD AUTO: 0.02 10*3/MM3 (ref 0–0.05)
IMM GRANULOCYTES NFR BLD AUTO: 0.3 % (ref 0–0.5)
KETONES UR QL STRIP: NEGATIVE
LEUKOCYTE ESTERASE UR QL STRIP.AUTO: ABNORMAL
LYMPHOCYTES # BLD AUTO: 2.43 10*3/MM3 (ref 0.7–3.1)
LYMPHOCYTES NFR BLD AUTO: 37.6 % (ref 19.6–45.3)
MCH RBC QN AUTO: 31.7 PG (ref 26.6–33)
MCHC RBC AUTO-ENTMCNC: 33.3 G/DL (ref 31.5–35.7)
MCV RBC AUTO: 95.1 FL (ref 79–97)
MONOCYTES # BLD AUTO: 0.64 10*3/MM3 (ref 0.1–0.9)
MONOCYTES NFR BLD AUTO: 9.9 % (ref 5–12)
NEUTROPHILS NFR BLD AUTO: 2.76 10*3/MM3 (ref 1.7–7)
NEUTROPHILS NFR BLD AUTO: 42.8 % (ref 42.7–76)
NITRITE UR QL STRIP: NEGATIVE
NRBC BLD AUTO-RTO: 0 /100 WBC (ref 0–0.2)
PH UR STRIP.AUTO: 7 [PH] (ref 5–8)
PLATELET # BLD AUTO: 277 10*3/MM3 (ref 140–450)
PMV BLD AUTO: 9.7 FL (ref 6–12)
POTASSIUM SERPL-SCNC: 4.2 MMOL/L (ref 3.5–5.2)
PROT SERPL-MCNC: 6.7 G/DL (ref 6–8.5)
PROT UR QL STRIP: NEGATIVE
QT INTERVAL: 349 MS
RBC # BLD AUTO: 3.91 10*6/MM3 (ref 3.77–5.28)
RBC # UR: ABNORMAL /HPF
REF LAB TEST METHOD: ABNORMAL
SODIUM SERPL-SCNC: 142 MMOL/L (ref 136–145)
SP GR UR STRIP: 1.01 (ref 1–1.03)
SQUAMOUS #/AREA URNS HPF: ABNORMAL /HPF
UROBILINOGEN UR QL STRIP: ABNORMAL
WBC # BLD AUTO: 6.46 10*3/MM3 (ref 3.4–10.8)
WBC UR QL AUTO: ABNORMAL /HPF
WHOLE BLOOD HOLD SPECIMEN: NORMAL
WHOLE BLOOD HOLD SPECIMEN: NORMAL

## 2021-03-30 PROCEDURE — 72125 CT NECK SPINE W/O DYE: CPT

## 2021-03-30 PROCEDURE — 70450 CT HEAD/BRAIN W/O DYE: CPT

## 2021-03-30 PROCEDURE — 93010 ELECTROCARDIOGRAM REPORT: CPT | Performed by: INTERNAL MEDICINE

## 2021-03-30 PROCEDURE — 93005 ELECTROCARDIOGRAM TRACING: CPT | Performed by: NURSE PRACTITIONER

## 2021-03-30 PROCEDURE — 80053 COMPREHEN METABOLIC PANEL: CPT | Performed by: NURSE PRACTITIONER

## 2021-03-30 PROCEDURE — 81001 URINALYSIS AUTO W/SCOPE: CPT | Performed by: NURSE PRACTITIONER

## 2021-03-30 PROCEDURE — 85025 COMPLETE CBC W/AUTO DIFF WBC: CPT | Performed by: NURSE PRACTITIONER

## 2021-03-30 PROCEDURE — 99283 EMERGENCY DEPT VISIT LOW MDM: CPT

## 2021-03-30 RX ORDER — SODIUM PHOSPHATE,MONO-DIBASIC 19G-7G/118
ENEMA (ML) RECTAL
COMMUNITY

## 2021-03-30 RX ADMIN — SODIUM CHLORIDE 1000 ML: 9 INJECTION, SOLUTION INTRAVENOUS at 08:49

## 2021-04-15 ENCOUNTER — HOSPITAL ENCOUNTER (OUTPATIENT)
Dept: MAMMOGRAPHY | Facility: HOSPITAL | Age: 86
Discharge: HOME | End: 2021-04-15
Admitting: INTERNAL MEDICINE

## 2021-04-15 DIAGNOSIS — Z12.31 SCREENING MAMMOGRAM, ENCOUNTER FOR: ICD-10-CM

## 2021-04-15 PROCEDURE — 77063 BREAST TOMOSYNTHESIS BI: CPT

## 2021-04-15 PROCEDURE — 77067 SCR MAMMO BI INCL CAD: CPT

## 2021-05-06 ENCOUNTER — TRANSCRIBE ORDERS (OUTPATIENT)
Dept: ADMINISTRATIVE | Facility: HOSPITAL | Age: 86
End: 2021-05-06

## 2021-05-06 DIAGNOSIS — R09.89 BRUIT: Primary | ICD-10-CM

## 2021-05-12 ENCOUNTER — HOSPITAL ENCOUNTER (OUTPATIENT)
Dept: CARDIOLOGY | Facility: HOSPITAL | Age: 86
Setting detail: HOSPITAL OUTPATIENT SURGERY
Discharge: HOME | End: 2021-05-12
Admitting: INTERNAL MEDICINE

## 2021-05-12 DIAGNOSIS — R09.89 BRUIT: ICD-10-CM

## 2021-05-12 LAB
BH CV XLRA MEAS LEFT DIST CCA EDV: 16.8 CM/SEC
BH CV XLRA MEAS LEFT DIST CCA PSV: 67.1 CM/SEC
BH CV XLRA MEAS LEFT DIST ICA EDV: -21.1 CM/SEC
BH CV XLRA MEAS LEFT DIST ICA PSV: -64.6 CM/SEC
BH CV XLRA MEAS LEFT ICA/CCA RATIO: 1.65
BH CV XLRA MEAS LEFT MID ICA EDV: -31.4 CM/SEC
BH CV XLRA MEAS LEFT MID ICA PSV: -110.5 CM/SEC
BH CV XLRA MEAS LEFT PROX CCA EDV: 18.6 CM/SEC
BH CV XLRA MEAS LEFT PROX CCA PSV: 96.3 CM/SEC
BH CV XLRA MEAS LEFT PROX ECA EDV: -12.4 CM/SEC
BH CV XLRA MEAS LEFT PROX ECA PSV: -69 CM/SEC
BH CV XLRA MEAS LEFT PROX ICA EDV: 18 CM/SEC
BH CV XLRA MEAS LEFT PROX ICA PSV: 65.9 CM/SEC
BH CV XLRA MEAS LEFT PROX SCLA PSV: 101.6 CM/SEC
BH CV XLRA MEAS LEFT VERTEBRAL A EDV: 17.5 CM/SEC
BH CV XLRA MEAS LEFT VERTEBRAL A PSV: 88.3 CM/SEC
BH CV XLRA MEAS RIGHT DIST CCA EDV: 14.3 CM/SEC
BH CV XLRA MEAS RIGHT DIST CCA PSV: 65.9 CM/SEC
BH CV XLRA MEAS RIGHT DIST ICA EDV: -20.3 CM/SEC
BH CV XLRA MEAS RIGHT DIST ICA PSV: -67.3 CM/SEC
BH CV XLRA MEAS RIGHT ICA/CCA RATIO: 1.07
BH CV XLRA MEAS RIGHT MID ICA EDV: -23 CM/SEC
BH CV XLRA MEAS RIGHT MID ICA PSV: -70.2 CM/SEC
BH CV XLRA MEAS RIGHT PROX CCA EDV: -23 CM/SEC
BH CV XLRA MEAS RIGHT PROX CCA PSV: -85.7 CM/SEC
BH CV XLRA MEAS RIGHT PROX ECA EDV: -11.5 CM/SEC
BH CV XLRA MEAS RIGHT PROX ECA PSV: -59.3 CM/SEC
BH CV XLRA MEAS RIGHT PROX ICA EDV: 18.1 CM/SEC
BH CV XLRA MEAS RIGHT PROX ICA PSV: 67 CM/SEC
BH CV XLRA MEAS RIGHT PROX SCLA PSV: 77.7 CM/SEC
BH CV XLRA MEAS RIGHT VERTEBRAL A EDV: 10.3 CM/SEC
BH CV XLRA MEAS RIGHT VERTEBRAL A PSV: 46.7 CM/SEC
LEFT ARM BP: NORMAL MMHG
RIGHT ARM BP: NORMAL MMHG

## 2021-05-12 PROCEDURE — 93880 EXTRACRANIAL BILAT STUDY: CPT

## 2021-08-30 ENCOUNTER — TRANSCRIBE ORDERS (OUTPATIENT)
Dept: ADMINISTRATIVE | Facility: HOSPITAL | Age: 86
End: 2021-08-30

## 2021-08-30 DIAGNOSIS — M54.6 THORACIC BACK PAIN, UNSPECIFIED BACK PAIN LATERALITY, UNSPECIFIED CHRONICITY: Primary | ICD-10-CM

## 2021-08-30 DIAGNOSIS — M54.50 LOW BACK PAIN, UNSPECIFIED BACK PAIN LATERALITY, UNSPECIFIED CHRONICITY, UNSPECIFIED WHETHER SCIATICA PRESENT: ICD-10-CM

## 2021-09-08 ENCOUNTER — HOSPITAL ENCOUNTER (OUTPATIENT)
Dept: CT IMAGING | Facility: HOSPITAL | Age: 86
Discharge: HOME | End: 2021-09-08
Admitting: INTERNAL MEDICINE

## 2021-09-08 DIAGNOSIS — M54.6 THORACIC BACK PAIN, UNSPECIFIED BACK PAIN LATERALITY, UNSPECIFIED CHRONICITY: ICD-10-CM

## 2021-09-08 DIAGNOSIS — M54.50 LOW BACK PAIN, UNSPECIFIED BACK PAIN LATERALITY, UNSPECIFIED CHRONICITY, UNSPECIFIED WHETHER SCIATICA PRESENT: ICD-10-CM

## 2021-09-08 PROCEDURE — 72131 CT LUMBAR SPINE W/O DYE: CPT

## 2021-09-08 PROCEDURE — 72128 CT CHEST SPINE W/O DYE: CPT

## 2022-01-19 ENCOUNTER — TRANSCRIBE ORDERS (OUTPATIENT)
Dept: ADMINISTRATIVE | Facility: HOSPITAL | Age: 87
End: 2022-01-19

## 2022-01-19 DIAGNOSIS — Z13.9 SCREENING DUE: Primary | ICD-10-CM

## 2022-02-28 ENCOUNTER — TRANSCRIBE ORDERS (OUTPATIENT)
Dept: ADMINISTRATIVE | Facility: HOSPITAL | Age: 87
End: 2022-02-28

## 2022-02-28 DIAGNOSIS — Z12.31 SCREENING MAMMOGRAM FOR BREAST CANCER: Primary | ICD-10-CM

## 2022-03-11 ENCOUNTER — HOSPITAL ENCOUNTER (OUTPATIENT)
Dept: CT IMAGING | Facility: HOSPITAL | Age: 87
Discharge: HOME | End: 2022-03-11

## 2022-03-11 ENCOUNTER — HOSPITAL ENCOUNTER (OUTPATIENT)
Dept: CARDIOLOGY | Facility: HOSPITAL | Age: 87
Discharge: HOME | End: 2022-03-11

## 2022-03-11 DIAGNOSIS — Z13.9 SCREENING DUE: ICD-10-CM

## 2022-03-11 PROCEDURE — 75571 CT HRT W/O DYE W/CA TEST: CPT

## 2022-03-11 PROCEDURE — 93799 UNLISTED CV SVC/PROCEDURE: CPT

## 2022-03-12 LAB
BH CV XLRA MEAS - MID AO DIAM: 1.8 CM
BH CV XLRA MEAS - PAD LEFT ABI PT: 1.05
BH CV XLRA MEAS - PAD LEFT ARM: 168 MMHG
BH CV XLRA MEAS - PAD LEFT LEG PT: 177 MMHG
BH CV XLRA MEAS - PAD RIGHT ABI PT: 1.04
BH CV XLRA MEAS - PAD RIGHT ARM: 160 MMHG
BH CV XLRA MEAS - PAD RIGHT LEG PT: 175 MMHG
BH CV XLRA MEAS LEFT DIST CCA EDV: 24.2 CM/SEC
BH CV XLRA MEAS LEFT DIST CCA PSV: 85.1 CM/SEC
BH CV XLRA MEAS LEFT ICA/CCA RATIO: 1.2
BH CV XLRA MEAS LEFT MID CCA PSV: 85 CM/SEC
BH CV XLRA MEAS LEFT MID ICA PSV: 105 CM/SEC
BH CV XLRA MEAS LEFT PROX ICA EDV: 33.5 CM/SEC
BH CV XLRA MEAS LEFT PROX ICA PSV: 105 CM/SEC
BH CV XLRA MEAS RIGHT DIST CCA EDV: 19.3 CM/SEC
BH CV XLRA MEAS RIGHT DIST CCA PSV: 63.4 CM/SEC
BH CV XLRA MEAS RIGHT ICA/CCA RATIO: 1.4
BH CV XLRA MEAS RIGHT MID CCA PSV: 63 CM/SEC
BH CV XLRA MEAS RIGHT MID ICA PSV: 90 CM/SEC
BH CV XLRA MEAS RIGHT PROX ICA EDV: 27.3 CM/SEC
BH CV XLRA MEAS RIGHT PROX ICA PSV: 90.7 CM/SEC
MAXIMAL PREDICTED HEART RATE: 134 BPM
STRESS TARGET HR: 114 BPM

## 2022-05-17 ENCOUNTER — HOSPITAL ENCOUNTER (OUTPATIENT)
Dept: MAMMOGRAPHY | Facility: HOSPITAL | Age: 87
Discharge: HOME | End: 2022-05-17
Admitting: INTERNAL MEDICINE

## 2022-05-17 DIAGNOSIS — Z12.31 SCREENING MAMMOGRAM FOR BREAST CANCER: ICD-10-CM

## 2022-05-17 PROCEDURE — 77063 BREAST TOMOSYNTHESIS BI: CPT

## 2022-05-17 PROCEDURE — 77067 SCR MAMMO BI INCL CAD: CPT

## 2022-07-20 ENCOUNTER — TRANSCRIBE ORDERS (OUTPATIENT)
Dept: ADMINISTRATIVE | Facility: HOSPITAL | Age: 87
End: 2022-07-20

## 2022-07-20 DIAGNOSIS — R14.0 ABDOMINAL DISTENTION: Primary | ICD-10-CM

## 2022-07-28 ENCOUNTER — APPOINTMENT (OUTPATIENT)
Dept: GENERAL RADIOLOGY | Facility: HOSPITAL | Age: 87
End: 2022-07-28

## 2022-08-03 ENCOUNTER — HOSPITAL ENCOUNTER (OUTPATIENT)
Dept: GENERAL RADIOLOGY | Facility: HOSPITAL | Age: 87
Discharge: HOME | End: 2022-08-03

## 2022-08-03 ENCOUNTER — HOSPITAL ENCOUNTER (OUTPATIENT)
Dept: ULTRASOUND IMAGING | Facility: HOSPITAL | Age: 87
Discharge: HOME | End: 2022-08-03

## 2022-08-03 DIAGNOSIS — R14.0 ABDOMINAL DISTENTION: ICD-10-CM

## 2022-08-03 PROCEDURE — 76700 US EXAM ABDOM COMPLETE: CPT

## 2022-08-03 PROCEDURE — 74246 X-RAY XM UPR GI TRC 2CNTRST: CPT

## 2022-08-03 PROCEDURE — 74248 X-RAY SM INT F-THRU STD: CPT

## 2022-08-03 RX ADMIN — BARIUM SULFATE 135 ML: 980 POWDER, FOR SUSPENSION ORAL at 08:10

## 2022-08-03 RX ADMIN — ANTACID/ANTIFLATULENT 1 PACKET: 380; 550; 10; 10 GRANULE, EFFERVESCENT ORAL at 08:05

## 2022-08-03 RX ADMIN — BARIUM SULFATE 366 ML: 960 POWDER, FOR SUSPENSION ORAL at 08:15

## 2022-08-25 ENCOUNTER — TRANSCRIBE ORDERS (OUTPATIENT)
Dept: ADMINISTRATIVE | Facility: HOSPITAL | Age: 87
End: 2022-08-25

## 2022-08-25 DIAGNOSIS — R91.8 LUNG NODULES: Primary | ICD-10-CM

## 2022-09-01 ENCOUNTER — HOSPITAL ENCOUNTER (OUTPATIENT)
Dept: CT IMAGING | Facility: HOSPITAL | Age: 87
Discharge: HOME | End: 2022-09-01
Admitting: INTERNAL MEDICINE

## 2022-09-01 DIAGNOSIS — R91.8 LUNG NODULES: ICD-10-CM

## 2022-09-01 PROCEDURE — 71250 CT THORAX DX C-: CPT

## 2022-09-09 ENCOUNTER — TRANSCRIBE ORDERS (OUTPATIENT)
Dept: ADMINISTRATIVE | Facility: HOSPITAL | Age: 87
End: 2022-09-09

## 2022-09-09 DIAGNOSIS — R91.8 LUNG MASS: Primary | ICD-10-CM

## 2022-09-21 ENCOUNTER — HOSPITAL ENCOUNTER (OUTPATIENT)
Dept: PET IMAGING | Facility: HOSPITAL | Age: 87
Discharge: HOME | End: 2022-09-21

## 2022-09-21 DIAGNOSIS — R91.8 LUNG MASS: ICD-10-CM

## 2022-09-21 LAB — GLUCOSE BLDC GLUCOMTR-MCNC: 105 MG/DL (ref 70–130)

## 2022-09-21 PROCEDURE — A9552 F18 FDG: HCPCS | Performed by: INTERNAL MEDICINE

## 2022-09-21 PROCEDURE — 0 FLUDEOXYGLUCOSE F18 SOLUTION: Performed by: INTERNAL MEDICINE

## 2022-09-21 PROCEDURE — 82962 GLUCOSE BLOOD TEST: CPT

## 2022-09-21 PROCEDURE — 78815 PET IMAGE W/CT SKULL-THIGH: CPT

## 2022-09-21 RX ADMIN — FLUDEOXYGLUCOSE F18 1 DOSE: 300 INJECTION INTRAVENOUS at 08:35

## 2022-10-04 ENCOUNTER — TRANSCRIBE ORDERS (OUTPATIENT)
Dept: ADMINISTRATIVE | Facility: HOSPITAL | Age: 87
End: 2022-10-04

## 2022-10-04 DIAGNOSIS — R91.8 LUNG MASS: Primary | ICD-10-CM

## 2022-12-27 ENCOUNTER — HOSPITAL ENCOUNTER (OUTPATIENT)
Dept: CT IMAGING | Facility: HOSPITAL | Age: 87
Discharge: HOME | End: 2022-12-27
Admitting: INTERNAL MEDICINE

## 2022-12-27 DIAGNOSIS — R91.8 LUNG MASS: ICD-10-CM

## 2022-12-27 PROCEDURE — 71250 CT THORAX DX C-: CPT

## 2023-01-05 ENCOUNTER — TRANSCRIBE ORDERS (OUTPATIENT)
Dept: ADMINISTRATIVE | Facility: HOSPITAL | Age: 88
End: 2023-01-05
Payer: MEDICARE

## 2023-01-05 DIAGNOSIS — R91.8 PULMONARY INFILTRATE: Primary | ICD-10-CM

## 2023-04-06 ENCOUNTER — TRANSCRIBE ORDERS (OUTPATIENT)
Dept: ADMINISTRATIVE | Facility: HOSPITAL | Age: 88
End: 2023-04-06
Payer: MEDICARE

## 2023-04-06 DIAGNOSIS — Z12.31 SCREENING MAMMOGRAM FOR HIGH-RISK PATIENT: Primary | ICD-10-CM

## 2023-05-01 ENCOUNTER — HOSPITAL ENCOUNTER (OUTPATIENT)
Dept: CT IMAGING | Facility: HOSPITAL | Age: 88
Discharge: HOME | End: 2023-05-01
Admitting: INTERNAL MEDICINE
Payer: MEDICARE

## 2023-05-01 DIAGNOSIS — R91.8 PULMONARY INFILTRATE: ICD-10-CM

## 2023-05-01 PROCEDURE — 71250 CT THORAX DX C-: CPT

## 2023-05-08 ENCOUNTER — TRANSCRIBE ORDERS (OUTPATIENT)
Dept: ADMINISTRATIVE | Facility: HOSPITAL | Age: 88
End: 2023-05-08
Payer: MEDICARE

## 2023-05-08 DIAGNOSIS — R91.8 LUNG NODULES: Primary | ICD-10-CM

## 2023-05-18 ENCOUNTER — TRANSCRIBE ORDERS (OUTPATIENT)
Dept: ADMINISTRATIVE | Facility: HOSPITAL | Age: 88
End: 2023-05-18
Payer: MEDICARE

## 2023-05-18 DIAGNOSIS — R10.9 ABDOMINAL PAIN, UNSPECIFIED ABDOMINAL LOCATION: Primary | ICD-10-CM

## 2023-05-23 ENCOUNTER — HOSPITAL ENCOUNTER (OUTPATIENT)
Dept: MAMMOGRAPHY | Facility: HOSPITAL | Age: 88
Discharge: HOME | End: 2023-05-23
Admitting: INTERNAL MEDICINE
Payer: MEDICARE

## 2023-05-23 DIAGNOSIS — Z12.31 SCREENING MAMMOGRAM FOR HIGH-RISK PATIENT: ICD-10-CM

## 2023-05-23 PROCEDURE — 77063 BREAST TOMOSYNTHESIS BI: CPT

## 2023-05-23 PROCEDURE — 77067 SCR MAMMO BI INCL CAD: CPT

## 2023-09-26 ENCOUNTER — HOSPITAL ENCOUNTER (OUTPATIENT)
Facility: HOSPITAL | Age: 88
Discharge: HOME | End: 2023-09-26
Admitting: INTERNAL MEDICINE
Payer: MEDICARE

## 2023-09-26 DIAGNOSIS — R10.9 ABDOMINAL PAIN, UNSPECIFIED ABDOMINAL LOCATION: ICD-10-CM

## 2023-09-26 PROCEDURE — 74178 CT ABD&PLV WO CNTR FLWD CNTR: CPT

## 2023-09-26 PROCEDURE — 25510000001 IOPAMIDOL 61 % SOLUTION: Performed by: INTERNAL MEDICINE

## 2023-09-26 PROCEDURE — 0 DIATRIZOATE MEGLUMINE & SODIUM PER 1 ML: Performed by: INTERNAL MEDICINE

## 2023-09-26 RX ADMIN — DIATRIZOATE MEGLUMINE AND DIATRIZOATE SODIUM 30 ML: 600; 100 SOLUTION ORAL; RECTAL at 12:05

## 2023-09-26 RX ADMIN — IOPAMIDOL 100 ML: 612 INJECTION, SOLUTION INTRAVENOUS at 13:40

## 2023-10-24 ENCOUNTER — HOSPITAL ENCOUNTER (OUTPATIENT)
Dept: CT IMAGING | Facility: HOSPITAL | Age: 88
Discharge: HOME | End: 2023-10-24
Admitting: INTERNAL MEDICINE
Payer: MEDICARE

## 2023-10-24 DIAGNOSIS — R91.8 LUNG NODULES: ICD-10-CM

## 2023-10-24 PROCEDURE — 71250 CT THORAX DX C-: CPT

## 2023-10-27 LAB — CREAT BLDA-MCNC: 1 MG/DL (ref 0.6–1.3)

## 2024-01-16 ENCOUNTER — HOSPITAL ENCOUNTER (OUTPATIENT)
Dept: GENERAL RADIOLOGY | Facility: HOSPITAL | Age: 89
Discharge: HOME | End: 2024-01-16
Payer: MEDICARE

## 2024-01-16 DIAGNOSIS — R52 PAIN: ICD-10-CM

## 2024-01-16 PROCEDURE — 73630 X-RAY EXAM OF FOOT: CPT

## 2024-01-16 PROCEDURE — 73610 X-RAY EXAM OF ANKLE: CPT

## 2024-04-30 ENCOUNTER — TRANSCRIBE ORDERS (OUTPATIENT)
Dept: ADMINISTRATIVE | Facility: HOSPITAL | Age: 89
End: 2024-04-30
Payer: MEDICARE

## 2024-04-30 DIAGNOSIS — Z13.6 ENCOUNTER FOR SCREENING FOR VASCULAR DISEASE: Primary | ICD-10-CM

## 2024-07-02 ENCOUNTER — TRANSCRIBE ORDERS (OUTPATIENT)
Dept: ADMINISTRATIVE | Facility: HOSPITAL | Age: 89
End: 2024-07-02
Payer: MEDICARE

## 2024-07-02 DIAGNOSIS — Z12.31 BREAST CANCER SCREENING BY MAMMOGRAM: Primary | ICD-10-CM

## 2024-07-16 ENCOUNTER — TRANSCRIBE ORDERS (OUTPATIENT)
Dept: ADMINISTRATIVE | Facility: HOSPITAL | Age: 89
End: 2024-07-16
Payer: MEDICARE

## 2024-07-16 ENCOUNTER — HOSPITAL ENCOUNTER (OUTPATIENT)
Dept: CARDIOLOGY | Facility: HOSPITAL | Age: 89
Discharge: HOME | End: 2024-07-16
Admitting: INTERNAL MEDICINE

## 2024-07-16 DIAGNOSIS — I25.10 ASHD (ARTERIOSCLEROTIC HEART DISEASE): Primary | ICD-10-CM

## 2024-07-16 DIAGNOSIS — Z13.6 ENCOUNTER FOR SCREENING FOR VASCULAR DISEASE: ICD-10-CM

## 2024-07-16 LAB
BH CV VAS SCREENING CAROTID CCA LEFT: 67 CM/SEC
BH CV VAS SCREENING CAROTID CCA RIGHT: 60 CM/SEC
BH CV VAS SCREENING CAROTID ICA LEFT: 75 CM/SEC
BH CV VAS SCREENING CAROTID ICA RIGHT: 85 CM/SEC
BH CV XLRA MEAS - MID AO DIAM: 1.9 CM
BH CV XLRA MEAS - PAD LEFT ABI PT: 1.08
BH CV XLRA MEAS - PAD LEFT ARM: 163 MMHG
BH CV XLRA MEAS - PAD LEFT LEG PT: 181 MMHG
BH CV XLRA MEAS - PAD RIGHT ABI PT: 1.07
BH CV XLRA MEAS - PAD RIGHT ARM: 168 MMHG
BH CV XLRA MEAS - PAD RIGHT LEG PT: 179 MMHG
BH CV XLRA MEAS LEFT DIST CCA EDV: -15.5 CM/SEC
BH CV XLRA MEAS LEFT DIST CCA PSV: -67.1 CM/SEC
BH CV XLRA MEAS LEFT ICA/CCA RATIO: 1.1
BH CV XLRA MEAS LEFT PROX ICA PSV: 74.6 CM/SEC
BH CV XLRA MEAS RIGHT DIST CCA EDV: 11.2 CM/SEC
BH CV XLRA MEAS RIGHT DIST CCA PSV: 59.6 CM/SEC
BH CV XLRA MEAS RIGHT ICA/CCA RATIO: 1.4
BH CV XLRA MEAS RIGHT PROX ICA EDV: 22.4 CM/SEC
BH CV XLRA MEAS RIGHT PROX ICA PSV: 84.5 CM/SEC

## 2024-07-16 PROCEDURE — 93799 UNLISTED CV SVC/PROCEDURE: CPT

## 2024-07-17 ENCOUNTER — HOSPITAL ENCOUNTER (OUTPATIENT)
Dept: MAMMOGRAPHY | Facility: HOSPITAL | Age: 89
Discharge: HOME | End: 2024-07-17
Admitting: INTERNAL MEDICINE
Payer: MEDICARE

## 2024-07-17 DIAGNOSIS — Z12.31 BREAST CANCER SCREENING BY MAMMOGRAM: ICD-10-CM

## 2024-07-17 PROCEDURE — 77063 BREAST TOMOSYNTHESIS BI: CPT

## 2024-07-17 PROCEDURE — 77067 SCR MAMMO BI INCL CAD: CPT

## 2024-09-10 ENCOUNTER — HOSPITAL ENCOUNTER (OUTPATIENT)
Dept: CT IMAGING | Facility: HOSPITAL | Age: 89
Discharge: HOME | End: 2024-09-10
Admitting: INTERNAL MEDICINE

## 2024-09-10 DIAGNOSIS — I25.10 ASHD (ARTERIOSCLEROTIC HEART DISEASE): ICD-10-CM

## 2024-09-10 PROCEDURE — 75571 CT HRT W/O DYE W/CA TEST: CPT

## 2024-10-03 ENCOUNTER — TRANSCRIBE ORDERS (OUTPATIENT)
Dept: ADMINISTRATIVE | Facility: HOSPITAL | Age: 89
End: 2024-10-03
Payer: MEDICARE

## 2024-10-03 DIAGNOSIS — I25.10 ASHD (ARTERIOSCLEROTIC HEART DISEASE): Primary | ICD-10-CM

## 2024-10-22 ENCOUNTER — HOSPITAL ENCOUNTER (OUTPATIENT)
Dept: CARDIOLOGY | Facility: HOSPITAL | Age: 89
Discharge: HOME | End: 2024-10-22
Admitting: INTERNAL MEDICINE
Payer: MEDICARE

## 2024-10-22 VITALS
HEIGHT: 61 IN | SYSTOLIC BLOOD PRESSURE: 180 MMHG | BODY MASS INDEX: 26.43 KG/M2 | DIASTOLIC BLOOD PRESSURE: 90 MMHG | WEIGHT: 140 LBS

## 2024-10-22 DIAGNOSIS — I25.10 ASHD (ARTERIOSCLEROTIC HEART DISEASE): ICD-10-CM

## 2024-10-22 LAB
BH CV ECHO MEAS - AO MAX PG: 10.9 MMHG
BH CV ECHO MEAS - AO MEAN PG: 5.5 MMHG
BH CV ECHO MEAS - AO V2 MAX: 165.3 CM/SEC
BH CV ECHO MEAS - AO V2 VTI: 33.8 CM
BH CV ECHO MEAS - AVA(I,D): 1.85 CM2
BH CV ECHO MEAS - EDV(CUBED): 52 ML
BH CV ECHO MEAS - EDV(MOD-SP2): 49 ML
BH CV ECHO MEAS - EDV(MOD-SP4): 48 ML
BH CV ECHO MEAS - EF(MOD-BP): 58.4 %
BH CV ECHO MEAS - EF(MOD-SP2): 57.1 %
BH CV ECHO MEAS - EF(MOD-SP4): 60.4 %
BH CV ECHO MEAS - ESV(CUBED): 10.4 ML
BH CV ECHO MEAS - ESV(MOD-SP2): 21 ML
BH CV ECHO MEAS - ESV(MOD-SP4): 19 ML
BH CV ECHO MEAS - FS: 41.6 %
BH CV ECHO MEAS - IVS/LVPW: 0.94 CM
BH CV ECHO MEAS - IVSD: 0.78 CM
BH CV ECHO MEAS - LAT PEAK E' VEL: 9.8 CM/SEC
BH CV ECHO MEAS - LV MASS(C)D: 84.4 GRAMS
BH CV ECHO MEAS - LV MAX PG: 5 MMHG
BH CV ECHO MEAS - LV MEAN PG: 2.33 MMHG
BH CV ECHO MEAS - LV V1 MAX: 111.4 CM/SEC
BH CV ECHO MEAS - LV V1 VTI: 21.8 CM
BH CV ECHO MEAS - LVIDD: 3.7 CM
BH CV ECHO MEAS - LVIDS: 2.18 CM
BH CV ECHO MEAS - LVOT AREA: 2.9 CM2
BH CV ECHO MEAS - LVOT DIAM: 1.91 CM
BH CV ECHO MEAS - LVPWD: 0.83 CM
BH CV ECHO MEAS - MED PEAK E' VEL: 5.3 CM/SEC
BH CV ECHO MEAS - MR MAX PG: 113.7 MMHG
BH CV ECHO MEAS - MR MAX VEL: 533.1 CM/SEC
BH CV ECHO MEAS - MV A DUR: 0.14 SEC
BH CV ECHO MEAS - MV A MAX VEL: 140 CM/SEC
BH CV ECHO MEAS - MV DEC SLOPE: 498.9 CM/SEC2
BH CV ECHO MEAS - MV DEC TIME: 0.17 SEC
BH CV ECHO MEAS - MV E MAX VEL: 118 CM/SEC
BH CV ECHO MEAS - MV E/A: 0.84
BH CV ECHO MEAS - MV MAX PG: 11.8 MMHG
BH CV ECHO MEAS - MV MEAN PG: 5 MMHG
BH CV ECHO MEAS - MV P1/2T: 69.5 MSEC
BH CV ECHO MEAS - MV V2 VTI: 30.1 CM
BH CV ECHO MEAS - MVA(P1/2T): 3.2 CM2
BH CV ECHO MEAS - MVA(VTI): 2.08 CM2
BH CV ECHO MEAS - RAP SYSTOLE: 3 MMHG
BH CV ECHO MEAS - RVSP: 32 MMHG
BH CV ECHO MEAS - SV(LVOT): 62.5 ML
BH CV ECHO MEAS - SV(MOD-SP2): 28 ML
BH CV ECHO MEAS - SV(MOD-SP4): 29 ML
BH CV ECHO MEAS - TAPSE (>1.6): 1.87 CM
BH CV ECHO MEAS - TR MAX PG: 29.2 MMHG
BH CV ECHO MEAS - TR MAX VEL: 270 CM/SEC
BH CV ECHO MEASUREMENTS AVERAGE E/E' RATIO: 15.63
BH CV XLRA - RV BASE: 3.1 CM
BH CV XLRA - RV LENGTH: 5.5 CM
BH CV XLRA - TDI S': 11.2 CM/SEC
LEFT ATRIUM VOLUME INDEX: 17.3 ML/M2
SINUS: 2.6 CM
STJ: 2.23 CM

## 2024-10-22 PROCEDURE — 93306 TTE W/DOPPLER COMPLETE: CPT | Performed by: INTERNAL MEDICINE

## 2024-10-22 PROCEDURE — 93306 TTE W/DOPPLER COMPLETE: CPT

## 2025-04-03 ENCOUNTER — HOSPITAL ENCOUNTER (OUTPATIENT)
Facility: HOSPITAL | Age: 89
Setting detail: OBSERVATION
Discharge: HOME | End: 2025-04-05
Attending: EMERGENCY MEDICINE | Admitting: INTERNAL MEDICINE
Payer: MEDICARE

## 2025-04-03 DIAGNOSIS — M79.605 PAIN OF LEFT LOWER EXTREMITY: Primary | ICD-10-CM

## 2025-04-03 LAB
ALBUMIN SERPL-MCNC: 4.1 G/DL (ref 3.5–5.2)
ALBUMIN/GLOB SERPL: 1.5 G/DL
ALP SERPL-CCNC: 80 U/L (ref 39–117)
ALT SERPL W P-5'-P-CCNC: 16 U/L (ref 1–33)
ANION GAP SERPL CALCULATED.3IONS-SCNC: 12 MMOL/L (ref 5–15)
AST SERPL-CCNC: 22 U/L (ref 1–32)
BASOPHILS # BLD AUTO: 0.1 10*3/MM3 (ref 0–0.2)
BASOPHILS NFR BLD AUTO: 1.2 % (ref 0–1.5)
BILIRUB SERPL-MCNC: <0.2 MG/DL (ref 0–1.2)
BUN SERPL-MCNC: 30 MG/DL (ref 8–23)
BUN/CREAT SERPL: 30.6 (ref 7–25)
CALCIUM SPEC-SCNC: 9.4 MG/DL (ref 8.6–10.5)
CHLORIDE SERPL-SCNC: 104 MMOL/L (ref 98–107)
CO2 SERPL-SCNC: 24 MMOL/L (ref 22–29)
CREAT SERPL-MCNC: 0.98 MG/DL (ref 0.57–1)
DEPRECATED RDW RBC AUTO: 40.3 FL (ref 37–54)
EGFRCR SERPLBLD CKD-EPI 2021: 55.3 ML/MIN/1.73
EOSINOPHIL # BLD AUTO: 0.44 10*3/MM3 (ref 0–0.4)
EOSINOPHIL NFR BLD AUTO: 5.4 % (ref 0.3–6.2)
ERYTHROCYTE [DISTWIDTH] IN BLOOD BY AUTOMATED COUNT: 11.6 % (ref 12.3–15.4)
GLOBULIN UR ELPH-MCNC: 2.8 GM/DL
GLUCOSE SERPL-MCNC: 102 MG/DL (ref 65–99)
HCT VFR BLD AUTO: 33.6 % (ref 34–46.6)
HGB BLD-MCNC: 11.5 G/DL (ref 12–15.9)
HOLD SPECIMEN: NORMAL
HOLD SPECIMEN: NORMAL
IMM GRANULOCYTES # BLD AUTO: 0.02 10*3/MM3 (ref 0–0.05)
IMM GRANULOCYTES NFR BLD AUTO: 0.2 % (ref 0–0.5)
LYMPHOCYTES # BLD AUTO: 1.8 10*3/MM3 (ref 0.7–3.1)
LYMPHOCYTES NFR BLD AUTO: 22 % (ref 19.6–45.3)
MAGNESIUM SERPL-MCNC: 2.2 MG/DL (ref 1.6–2.4)
MCH RBC QN AUTO: 33 PG (ref 26.6–33)
MCHC RBC AUTO-ENTMCNC: 34.2 G/DL (ref 31.5–35.7)
MCV RBC AUTO: 96.3 FL (ref 79–97)
MONOCYTES # BLD AUTO: 1.09 10*3/MM3 (ref 0.1–0.9)
MONOCYTES NFR BLD AUTO: 13.3 % (ref 5–12)
NEUTROPHILS NFR BLD AUTO: 4.75 10*3/MM3 (ref 1.7–7)
NEUTROPHILS NFR BLD AUTO: 57.9 % (ref 42.7–76)
NRBC BLD AUTO-RTO: 0 /100 WBC (ref 0–0.2)
PLATELET # BLD AUTO: 264 10*3/MM3 (ref 140–450)
PMV BLD AUTO: 9.2 FL (ref 6–12)
POTASSIUM SERPL-SCNC: 4.2 MMOL/L (ref 3.5–5.2)
PROT SERPL-MCNC: 6.9 G/DL (ref 6–8.5)
RBC # BLD AUTO: 3.49 10*6/MM3 (ref 3.77–5.28)
SODIUM SERPL-SCNC: 140 MMOL/L (ref 136–145)
WBC NRBC COR # BLD AUTO: 8.2 10*3/MM3 (ref 3.4–10.8)
WHOLE BLOOD HOLD COAG: NORMAL
WHOLE BLOOD HOLD SPECIMEN: NORMAL

## 2025-04-03 PROCEDURE — 85025 COMPLETE CBC W/AUTO DIFF WBC: CPT | Performed by: EMERGENCY MEDICINE

## 2025-04-03 PROCEDURE — 25010000002 FENTANYL CITRATE (PF) 50 MCG/ML SOLUTION: Performed by: EMERGENCY MEDICINE

## 2025-04-03 PROCEDURE — 80053 COMPREHEN METABOLIC PANEL: CPT | Performed by: EMERGENCY MEDICINE

## 2025-04-03 PROCEDURE — 25010000002 ONDANSETRON PER 1 MG: Performed by: EMERGENCY MEDICINE

## 2025-04-03 PROCEDURE — 83735 ASSAY OF MAGNESIUM: CPT | Performed by: EMERGENCY MEDICINE

## 2025-04-03 PROCEDURE — 99285 EMERGENCY DEPT VISIT HI MDM: CPT

## 2025-04-03 RX ORDER — ACETAMINOPHEN 500 MG
1000 TABLET ORAL ONCE
Status: COMPLETED | OUTPATIENT
Start: 2025-04-03 | End: 2025-04-03

## 2025-04-03 RX ORDER — ONDANSETRON 2 MG/ML
4 INJECTION INTRAMUSCULAR; INTRAVENOUS ONCE
Status: DISCONTINUED | OUTPATIENT
Start: 2025-04-03 | End: 2025-04-03

## 2025-04-03 RX ORDER — FENTANYL CITRATE 50 UG/ML
25 INJECTION, SOLUTION INTRAMUSCULAR; INTRAVENOUS ONCE
Refills: 0 | Status: DISCONTINUED | OUTPATIENT
Start: 2025-04-03 | End: 2025-04-03

## 2025-04-03 RX ORDER — KETOROLAC TROMETHAMINE 15 MG/ML
15 INJECTION, SOLUTION INTRAMUSCULAR; INTRAVENOUS ONCE
Status: COMPLETED | OUTPATIENT
Start: 2025-04-04 | End: 2025-04-04

## 2025-04-03 RX ADMIN — ACETAMINOPHEN 1000 MG: 500 TABLET, FILM COATED ORAL at 23:33

## 2025-04-04 ENCOUNTER — APPOINTMENT (OUTPATIENT)
Dept: GENERAL RADIOLOGY | Facility: HOSPITAL | Age: 89
End: 2025-04-04
Payer: MEDICARE

## 2025-04-04 ENCOUNTER — APPOINTMENT (OUTPATIENT)
Dept: CARDIOLOGY | Facility: HOSPITAL | Age: 89
End: 2025-04-04
Payer: MEDICARE

## 2025-04-04 PROBLEM — M79.605 LEFT LEG PAIN: Status: ACTIVE | Noted: 2025-04-04

## 2025-04-04 LAB
ALBUMIN SERPL-MCNC: 3.9 G/DL (ref 3.5–5.2)
ALBUMIN/GLOB SERPL: 1.5 G/DL
ALP SERPL-CCNC: 78 U/L (ref 39–117)
ALT SERPL W P-5'-P-CCNC: 11 U/L (ref 1–33)
ANION GAP SERPL CALCULATED.3IONS-SCNC: 11.5 MMOL/L (ref 5–15)
AST SERPL-CCNC: 20 U/L (ref 1–32)
BASOPHILS # BLD AUTO: 0.06 10*3/MM3 (ref 0–0.2)
BASOPHILS NFR BLD AUTO: 0.7 % (ref 0–1.5)
BH CV LOWER VASCULAR LEFT COMMON FEMORAL AUGMENT: NORMAL
BH CV LOWER VASCULAR LEFT COMMON FEMORAL COMPETENT: NORMAL
BH CV LOWER VASCULAR LEFT COMMON FEMORAL COMPRESS: NORMAL
BH CV LOWER VASCULAR LEFT COMMON FEMORAL PHASIC: NORMAL
BH CV LOWER VASCULAR LEFT COMMON FEMORAL SPONT: NORMAL
BH CV LOWER VASCULAR LEFT DISTAL FEMORAL COMPRESS: NORMAL
BH CV LOWER VASCULAR LEFT GASTRONEMIUS COMPRESS: NORMAL
BH CV LOWER VASCULAR LEFT GREATER SAPH AK COMPRESS: NORMAL
BH CV LOWER VASCULAR LEFT GREATER SAPH BK COMPRESS: NORMAL
BH CV LOWER VASCULAR LEFT LESSER SAPH COMPRESS: NORMAL
BH CV LOWER VASCULAR LEFT MID FEMORAL AUGMENT: NORMAL
BH CV LOWER VASCULAR LEFT MID FEMORAL COMPETENT: NORMAL
BH CV LOWER VASCULAR LEFT MID FEMORAL COMPRESS: NORMAL
BH CV LOWER VASCULAR LEFT MID FEMORAL PHASIC: NORMAL
BH CV LOWER VASCULAR LEFT MID FEMORAL SPONT: NORMAL
BH CV LOWER VASCULAR LEFT PERONEAL COMPRESS: NORMAL
BH CV LOWER VASCULAR LEFT POPLITEAL AUGMENT: NORMAL
BH CV LOWER VASCULAR LEFT POPLITEAL COMPETENT: NORMAL
BH CV LOWER VASCULAR LEFT POPLITEAL COMPRESS: NORMAL
BH CV LOWER VASCULAR LEFT POPLITEAL PHASIC: NORMAL
BH CV LOWER VASCULAR LEFT POPLITEAL SPONT: NORMAL
BH CV LOWER VASCULAR LEFT POSTERIOR TIBIAL COMPRESS: NORMAL
BH CV LOWER VASCULAR LEFT PROFUNDA FEMORAL COMPRESS: NORMAL
BH CV LOWER VASCULAR LEFT PROXIMAL FEMORAL COMPRESS: NORMAL
BH CV LOWER VASCULAR LEFT SAPHENOFEMORAL JUNCTION COMPRESS: NORMAL
BH CV LOWER VASCULAR RIGHT COMMON FEMORAL AUGMENT: NORMAL
BH CV LOWER VASCULAR RIGHT COMMON FEMORAL COMPETENT: NORMAL
BH CV LOWER VASCULAR RIGHT COMMON FEMORAL COMPRESS: NORMAL
BH CV LOWER VASCULAR RIGHT COMMON FEMORAL PHASIC: NORMAL
BH CV LOWER VASCULAR RIGHT COMMON FEMORAL SPONT: NORMAL
BILIRUB SERPL-MCNC: 0.3 MG/DL (ref 0–1.2)
BUN SERPL-MCNC: 24 MG/DL (ref 8–23)
BUN/CREAT SERPL: 23.5 (ref 7–25)
CALCIUM SPEC-SCNC: 9.3 MG/DL (ref 8.6–10.5)
CHLORIDE SERPL-SCNC: 109 MMOL/L (ref 98–107)
CO2 SERPL-SCNC: 25.5 MMOL/L (ref 22–29)
CREAT SERPL-MCNC: 1.02 MG/DL (ref 0.57–1)
DEPRECATED RDW RBC AUTO: 42.8 FL (ref 37–54)
EGFRCR SERPLBLD CKD-EPI 2021: 52.7 ML/MIN/1.73
EOSINOPHIL # BLD AUTO: 0.39 10*3/MM3 (ref 0–0.4)
EOSINOPHIL NFR BLD AUTO: 4.9 % (ref 0.3–6.2)
ERYTHROCYTE [DISTWIDTH] IN BLOOD BY AUTOMATED COUNT: 11.7 % (ref 12.3–15.4)
GEN 5 1HR TROPONIN T REFLEX: 16 NG/L
GLOBULIN UR ELPH-MCNC: 2.6 GM/DL
GLUCOSE SERPL-MCNC: 99 MG/DL (ref 65–99)
HCT VFR BLD AUTO: 35.4 % (ref 34–46.6)
HGB BLD-MCNC: 11.4 G/DL (ref 12–15.9)
IMM GRANULOCYTES # BLD AUTO: 0.02 10*3/MM3 (ref 0–0.05)
IMM GRANULOCYTES NFR BLD AUTO: 0.2 % (ref 0–0.5)
LYMPHOCYTES # BLD AUTO: 2.04 10*3/MM3 (ref 0.7–3.1)
LYMPHOCYTES NFR BLD AUTO: 25.4 % (ref 19.6–45.3)
MAGNESIUM SERPL-MCNC: 2.3 MG/DL (ref 1.6–2.4)
MCH RBC QN AUTO: 32 PG (ref 26.6–33)
MCHC RBC AUTO-ENTMCNC: 32.2 G/DL (ref 31.5–35.7)
MCV RBC AUTO: 99.4 FL (ref 79–97)
MONOCYTES # BLD AUTO: 1.13 10*3/MM3 (ref 0.1–0.9)
MONOCYTES NFR BLD AUTO: 14.1 % (ref 5–12)
NEUTROPHILS NFR BLD AUTO: 4.39 10*3/MM3 (ref 1.7–7)
NEUTROPHILS NFR BLD AUTO: 54.7 % (ref 42.7–76)
NRBC BLD AUTO-RTO: 0 /100 WBC (ref 0–0.2)
NT-PROBNP SERPL-MCNC: 176 PG/ML (ref 0–1800)
PLATELET # BLD AUTO: 276 10*3/MM3 (ref 140–450)
PMV BLD AUTO: 9.4 FL (ref 6–12)
POTASSIUM SERPL-SCNC: 4.6 MMOL/L (ref 3.5–5.2)
PROT SERPL-MCNC: 6.5 G/DL (ref 6–8.5)
RBC # BLD AUTO: 3.56 10*6/MM3 (ref 3.77–5.28)
SODIUM SERPL-SCNC: 146 MMOL/L (ref 136–145)
TROPONIN T NUMERIC DELTA: 3 NG/L
TROPONIN T SERPL HS-MCNC: 13 NG/L
WBC NRBC COR # BLD AUTO: 8.03 10*3/MM3 (ref 3.4–10.8)

## 2025-04-04 PROCEDURE — 84484 ASSAY OF TROPONIN QUANT: CPT | Performed by: INTERNAL MEDICINE

## 2025-04-04 PROCEDURE — 25010000002 ENOXAPARIN PER 10 MG: Performed by: INTERNAL MEDICINE

## 2025-04-04 PROCEDURE — 25010000002 HYDRALAZINE PER 20 MG: Performed by: EMERGENCY MEDICINE

## 2025-04-04 PROCEDURE — 85025 COMPLETE CBC W/AUTO DIFF WBC: CPT | Performed by: INTERNAL MEDICINE

## 2025-04-04 PROCEDURE — 83735 ASSAY OF MAGNESIUM: CPT | Performed by: INTERNAL MEDICINE

## 2025-04-04 PROCEDURE — 73562 X-RAY EXAM OF KNEE 3: CPT

## 2025-04-04 PROCEDURE — 93971 EXTREMITY STUDY: CPT

## 2025-04-04 PROCEDURE — 96375 TX/PRO/DX INJ NEW DRUG ADDON: CPT

## 2025-04-04 PROCEDURE — 96374 THER/PROPH/DIAG INJ IV PUSH: CPT

## 2025-04-04 PROCEDURE — G0378 HOSPITAL OBSERVATION PER HR: HCPCS

## 2025-04-04 PROCEDURE — 25010000002 KETOROLAC TROMETHAMINE PER 15 MG: Performed by: EMERGENCY MEDICINE

## 2025-04-04 PROCEDURE — 93971 EXTREMITY STUDY: CPT | Performed by: SURGERY

## 2025-04-04 PROCEDURE — 96372 THER/PROPH/DIAG INJ SC/IM: CPT

## 2025-04-04 PROCEDURE — 80053 COMPREHEN METABOLIC PANEL: CPT | Performed by: INTERNAL MEDICINE

## 2025-04-04 PROCEDURE — 83880 ASSAY OF NATRIURETIC PEPTIDE: CPT | Performed by: INTERNAL MEDICINE

## 2025-04-04 RX ORDER — BISACODYL 10 MG
10 SUPPOSITORY, RECTAL RECTAL DAILY PRN
Status: DISCONTINUED | OUTPATIENT
Start: 2025-04-04 | End: 2025-04-05 | Stop reason: HOSPADM

## 2025-04-04 RX ORDER — PRAVASTATIN SODIUM 10 MG
5 TABLET ORAL NIGHTLY
Status: DISCONTINUED | OUTPATIENT
Start: 2025-04-04 | End: 2025-04-05 | Stop reason: HOSPADM

## 2025-04-04 RX ORDER — FUROSEMIDE 20 MG/1
20 TABLET ORAL DAILY
Status: DISCONTINUED | OUTPATIENT
Start: 2025-04-04 | End: 2025-04-05 | Stop reason: HOSPADM

## 2025-04-04 RX ORDER — SODIUM CHLORIDE 9 MG/ML
40 INJECTION, SOLUTION INTRAVENOUS AS NEEDED
Status: DISCONTINUED | OUTPATIENT
Start: 2025-04-04 | End: 2025-04-05 | Stop reason: HOSPADM

## 2025-04-04 RX ORDER — ASPIRIN 81 MG/1
81 TABLET ORAL DAILY
Status: DISCONTINUED | OUTPATIENT
Start: 2025-04-04 | End: 2025-04-05 | Stop reason: HOSPADM

## 2025-04-04 RX ORDER — FUROSEMIDE 20 MG/1
20 TABLET ORAL DAILY
COMMUNITY
End: 2025-04-05 | Stop reason: HOSPADM

## 2025-04-04 RX ORDER — BISACODYL 5 MG/1
5 TABLET, DELAYED RELEASE ORAL DAILY PRN
Status: DISCONTINUED | OUTPATIENT
Start: 2025-04-04 | End: 2025-04-05 | Stop reason: HOSPADM

## 2025-04-04 RX ORDER — POLYETHYLENE GLYCOL 3350 17 G/17G
17 POWDER, FOR SOLUTION ORAL DAILY PRN
Status: DISCONTINUED | OUTPATIENT
Start: 2025-04-04 | End: 2025-04-05 | Stop reason: HOSPADM

## 2025-04-04 RX ORDER — ACETAMINOPHEN 325 MG/1
650 TABLET ORAL EVERY 6 HOURS PRN
Status: DISCONTINUED | OUTPATIENT
Start: 2025-04-04 | End: 2025-04-05 | Stop reason: HOSPADM

## 2025-04-04 RX ORDER — ENOXAPARIN SODIUM 100 MG/ML
30 INJECTION SUBCUTANEOUS EVERY 24 HOURS
Status: DISCONTINUED | OUTPATIENT
Start: 2025-04-04 | End: 2025-04-05 | Stop reason: HOSPADM

## 2025-04-04 RX ORDER — SODIUM CHLORIDE 0.9 % (FLUSH) 0.9 %
10 SYRINGE (ML) INJECTION EVERY 12 HOURS SCHEDULED
Status: DISCONTINUED | OUTPATIENT
Start: 2025-04-04 | End: 2025-04-05 | Stop reason: HOSPADM

## 2025-04-04 RX ORDER — HYDRALAZINE HYDROCHLORIDE 20 MG/ML
10 INJECTION INTRAMUSCULAR; INTRAVENOUS ONCE
Status: COMPLETED | OUTPATIENT
Start: 2025-04-04 | End: 2025-04-04

## 2025-04-04 RX ORDER — ASPIRIN 81 MG/1
81 TABLET ORAL DAILY
COMMUNITY
End: 2025-04-05 | Stop reason: HOSPADM

## 2025-04-04 RX ORDER — LOSARTAN POTASSIUM 100 MG/1
100 TABLET ORAL DAILY
Status: DISCONTINUED | OUTPATIENT
Start: 2025-04-04 | End: 2025-04-05 | Stop reason: HOSPADM

## 2025-04-04 RX ORDER — AMOXICILLIN 250 MG
2 CAPSULE ORAL 2 TIMES DAILY PRN
Status: DISCONTINUED | OUTPATIENT
Start: 2025-04-04 | End: 2025-04-05 | Stop reason: HOSPADM

## 2025-04-04 RX ORDER — SODIUM CHLORIDE 0.9 % (FLUSH) 0.9 %
10 SYRINGE (ML) INJECTION AS NEEDED
Status: DISCONTINUED | OUTPATIENT
Start: 2025-04-04 | End: 2025-04-05 | Stop reason: HOSPADM

## 2025-04-04 RX ADMIN — Medication 10 ML: at 21:26

## 2025-04-04 RX ADMIN — ASPIRIN 81 MG: 81 TABLET, COATED ORAL at 09:35

## 2025-04-04 RX ADMIN — Medication 10 ML: at 09:35

## 2025-04-04 RX ADMIN — ENOXAPARIN SODIUM 30 MG: 100 INJECTION SUBCUTANEOUS at 09:35

## 2025-04-04 RX ADMIN — LOSARTAN POTASSIUM 100 MG: 100 TABLET, FILM COATED ORAL at 09:35

## 2025-04-04 RX ADMIN — KETOROLAC TROMETHAMINE 15 MG: 15 INJECTION, SOLUTION INTRAMUSCULAR; INTRAVENOUS at 00:01

## 2025-04-04 RX ADMIN — FUROSEMIDE 20 MG: 20 TABLET ORAL at 09:35

## 2025-04-04 RX ADMIN — HYDRALAZINE HYDROCHLORIDE 10 MG: 20 INJECTION INTRAMUSCULAR; INTRAVENOUS at 00:17

## 2025-04-04 RX ADMIN — PRAVASTATIN SODIUM 5 MG: 10 TABLET ORAL at 20:44

## 2025-04-04 NOTE — PLAN OF CARE
Problem: Adult Inpatient Plan of Care  Goal: Plan of Care Review  Outcome: Progressing  Flowsheets (Taken 4/4/2025 0417)  Outcome Evaluation: Pt is A&OX4. VSS. C/O pain to LLE for 2 days that has been worsening to the point that yesterday she was unable to walk or put weight on it. BLE are edematous, left more. POC- pain management, fall precautions, LLE venous doppler.  Plan of Care Reviewed With: patient  Goal: Absence of Hospital-Acquired Illness or Injury  Outcome: Progressing  Intervention: Identify and Manage Fall Risk  Recent Flowsheet Documentation  Taken 4/4/2025 0400 by Dary Clay RN  Safety Promotion/Fall Prevention:   activity supervised   assistive device/personal items within reach   clutter free environment maintained   fall prevention program maintained   lighting adjusted   nonskid shoes/slippers when out of bed   room organization consistent   safety round/check completed  Intervention: Prevent Skin Injury  Recent Flowsheet Documentation  Taken 4/4/2025 0400 by Dary Clay RN  Body Position: position changed independently  Intervention: Prevent Infection  Recent Flowsheet Documentation  Taken 4/4/2025 0400 by Dary Clay RN  Infection Prevention:   environmental surveillance performed   hand hygiene promoted   personal protective equipment utilized   rest/sleep promoted   single patient room provided  Goal: Optimal Comfort and Wellbeing  Outcome: Progressing  Intervention: Provide Person-Centered Care  Recent Flowsheet Documentation  Taken 4/4/2025 0400 by Dary Clay RN  Trust Relationship/Rapport:   care explained   questions answered   questions encouraged  Goal: Readiness for Transition of Care  Outcome: Progressing  Intervention: Mutually Develop Transition Plan  Recent Flowsheet Documentation  Taken 4/4/2025 0307 by Dary Clay RN  Transportation Anticipated: family or friend will provide  Patient/Family Anticipated Services at Transition: none  Patient/Family Anticipates  Transition to:   home   home with family  Taken 4/4/2025 0300 by Dary Clay, RN  Equipment Currently Used at Home: cane, quad     Problem: Comorbidity Management  Goal: Maintenance of Asthma Control  Outcome: Progressing  Intervention: Maintain Asthma Symptom Control  Recent Flowsheet Documentation  Taken 4/4/2025 0400 by Dary Clay RN  Medication Review/Management: medications reviewed  Goal: Blood Pressure in Desired Range  Outcome: Progressing  Intervention: Maintain Blood Pressure Management  Recent Flowsheet Documentation  Taken 4/4/2025 0400 by Dary Clay RN  Medication Review/Management: medications reviewed   Goal Outcome Evaluation:  Plan of Care Reviewed With: patient           Outcome Evaluation: Pt is A&OX4. VSS. C/O pain to LLE for 2 days that has been worsening to the point that yesterday she was unable to walk or put weight on it. BLE are edematous, left more. POC- pain management, fall precautions, LLE venous doppler.

## 2025-04-04 NOTE — H&P
[facility]   HISTORY AND PHYSICAL    Patient Name: Gladys Izquierdo  : 1935  MRN: [id number]  Primary Care Physician:  Gray Thakur MD  Date of admission: 4/3/2025    Subjective   Subjective     Chief Complaint:   Leg swelling and pain.Patient well known to me. Without any trauma she developed a left calf discomfort and swelling. No chest pain. She is followed by me for hypertension mild diastolic congested heart failure and asthma.    History of Present IllnessNo previous episodes of venous thrombosis. She does have known lumbosacral disc condition. She did not have any particular sciatic symptoms along with this calf pain.    Review of Systems   Constitutional: Negative.    HENT: Negative.     Eyes: Negative.    Respiratory: Negative.     Cardiovascular: Negative.    Gastrointestinal: Negative.    Endocrine: Negative.    Genitourinary: Negative.    Musculoskeletal:         Pain and swelling in left calf   Neurological: Negative.    Psychiatric/Behavioral: Negative.          Personal History     Past Medical History:   Diagnosis Date    Aphasia     Asthma, cold induced     Cognitive impairment     DDD (degenerative disc disease), thoracolumbar     Essential hypertension     Hypertensive encephalopathy     Psoriasis        Past Surgical History:   Procedure Laterality Date    CEREBRAL ANEURYSM REPAIR      CHOLECYSTECTOMY      HYSTERECTOMY      with bladder repair    OTHER SURGICAL HISTORY      Repair 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 stages, and abdominal hernia repair    TONSILLECTOMY      age 7       Family History: family history includes No Known Problems in her brother, daughter, daughter, and son. Otherwise pertinent FHx was reviewed and not pertinent to current issue.    Social History:  reports that she quit smoking about 30 years ago. Her smoking use included cigarettes. She started smoking about 58 years ago. She has a 21 pack-year smoking history. She has never used smokeless  tobacco. She reports that she does not drink alcohol and does not use drugs.    Home Medications:  CBD, Magnesium, Probiotic Product, albuterol sulfate HFA, aspirin, betamethasone (augmented), calcium-vitamin D, fish oil, furosemide, glucosamine-chondroitin, losartan, methylPREDNISolone, multivitamin with minerals, nystatin, and pravastatin    Allergies:  Allergies   Allergen Reactions    Demerol [Meperidine]      B/P problems    Latex      blisters    Morphine And Codeine      Decreased b/p    Dilaudid [Hydromorphone Hcl] Rash    Levofloxacin Palpitations       Objective    Objective     Vitals:   Temp:  [97 °F (36.1 °C)-98.5 °F (36.9 °C)] 97 °F (36.1 °C)  Heart Rate:  [] 101  Resp:  [18] 18  BP: (148-207)/() 148/80    Physical Exam  Cardiovascular:      Rate and Rhythm: Normal rate and regular rhythm.   Pulmonary:      Effort: Pulmonary effort is normal.      Breath sounds: Normal breath sounds.   Abdominal:      General: Abdomen is flat.      Palpations: Abdomen is soft.   Musculoskeletal:         General: Tenderness present.      Left lower leg: Edema present.   Skin:     General: Skin is warm.   Neurological:      General: No focal deficit present.      Mental Status: She is alert.   Psychiatric:         Mood and Affect: Mood normal.       Result Review    Result Review:  I have personally reviewed the results from the time of this admission to 4/4/2025 03:59 EDT and agree with these findings:  []  Laboratory list / accordion  []  Microbiology  []  Radiology  []  EKG/Telemetry   []  Cardiology/Vascular   []  Pathology  []  Old records  []  Other:  Most notable findings include: new Onset of left leg swelling and pain rule out DVT      Assessment & Plan   Assessment / Plan     Brief Patient Summary:  Gladys Izquierdo is a 89 y.o. female who presents with leg swelling and pain and symptoms consistent with venous thrombosis.    Active Hospital Problems:  Active Hospital Problems    Diagnosis     **Left leg  pain      Plan:    Lower extremity venous doppler  Start LOvenox treatment  Monitor for PE    VTE Prophylaxis:  Pharmacologic VTE prophylaxis orders are present.        CODE STATUS:    Code Status (Patient has no pulse and is not breathing): CPR (Attempt to Resuscitate)  Medical Interventions (Patient has pulse or is breathing): Full Support  Level Of Support Discussed With: Patient    Admission Status:  I believe this patient meets observation       status.    Gray Thakur MD

## 2025-04-04 NOTE — ED PROVIDER NOTES
EMERGENCY DEPARTMENT ENCOUNTER    Room Number:  N626/1  PCP: Gray Thakur MD  Patient Care Team:  Gray Thakur MD as PCP - General (Internal Medicine)   Independent Historians: Patient, family    HPI:  Chief Complaint: Left leg pain    A complete HPI/ROS/PMH/PSH/SH/FH are unobtainable due to: None    Chronic or social conditions impacting patient care (Social Determinants of Health): None  (Financial Resource Strain / Food Insecurity / Transportation Needs / Physical Activity / Stress / Social Connections / Intimate Partner Violence / Housing Stability)    Context: Gladys Izquierdo is a 89 y.o. female who presents to the ED c/o acute left leg pain about 2 days.  Gradually worsening.  Patient states that she cannot walk anymore.  No tingling or numbness.  Patient was sent from primary care doctor advised to come to the ED concern for DVT.  No fever or chills no cough no shortness of breath.    Review of prior external notes (non-ED) -and- Review of prior external test results outside of this encounter: Patient primary care note from 3/27/2025    Prescription drug monitoring program review:         PAST MEDICAL HISTORY  Active Ambulatory Problems     Diagnosis Date Noted    Hypertension 06/21/2016    Colon polyps 06/21/2016    UTI (urinary tract infection) 06/21/2016    Asthma, cold induced 08/04/2016    DDD (degenerative disc disease), thoracolumbar 08/04/2016    Psoriasis 10/21/2016    H/O cerebral aneurysm repair 10/31/2018    Elevated TSH 04/29/2019    Dysuria 04/29/2019    Cognitive impairment 12/04/2020    Aphasia 12/05/2020    Hypertensive encephalopathy 01/28/2021     Resolved Ambulatory Problems     Diagnosis Date Noted    No Resolved Ambulatory Problems     Past Medical History:   Diagnosis Date    Essential hypertension          PAST SURGICAL HISTORY  Past Surgical History:   Procedure Laterality Date    CEREBRAL ANEURYSM REPAIR  2000    CHOLECYSTECTOMY  2005    HYSTERECTOMY      with  bladder repair    OTHER SURGICAL HISTORY      Repair 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 stages, and abdominal hernia repair    TONSILLECTOMY      age 7         FAMILY HISTORY  Family History   Problem Relation Age of Onset    No Known Problems Brother     No Known Problems Daughter     No Known Problems Son     No Known Problems Daughter          SOCIAL HISTORY  Social History     Socioeconomic History    Marital status:    Tobacco Use    Smoking status: Former     Current packs/day: 0.00     Average packs/day: 0.8 packs/day for 28.0 years (21.0 ttl pk-yrs)     Types: Cigarettes     Start date:      Quit date:      Years since quittin.2    Smokeless tobacco: Never    Tobacco comments:     less than 1 pack per day for 28 years    Vaping Use    Vaping status: Every Day   Substance and Sexual Activity    Alcohol use: No    Drug use: No    Sexual activity: Defer         ALLERGIES  Demerol [meperidine], Latex, Morphine and codeine, Dilaudid [hydromorphone hcl], and Levofloxacin        REVIEW OF SYSTEMS  Review of Systems  Included in HPI  All systems reviewed and negative except for those discussed in HPI.      PHYSICAL EXAM    I have reviewed the triage vital signs and nursing notes.    ED Triage Vitals   Temp Heart Rate Resp BP SpO2   25 2225 25 2225 25 2225 25 2235 25 2225   98.5 °F (36.9 °C) 99 18 (!) 207/104 94 %      Temp src Heart Rate Source Patient Position BP Location FiO2 (%)   -- -- -- -- --              Physical Exam  GENERAL: alert, no acute distress  SKIN: Warm, dry  HENT: Normocephalic, atraumatic  EYES: no scleral icterus  CV: regular rhythm, regular rate  RESPIRATORY: normal effort, lungs clear  ABDOMEN: soft, nontender, nondistended  MUSCULOSKELETAL: no deformity, tender palpation diffusely anterior posterior lower extremity no skin changes no obvious infection strong peripheral pulses  NEURO: alert, moves all extremities, follows commands                                                                LAB RESULTS  Recent Results (from the past 24 hours)   Comprehensive Metabolic Panel    Collection Time: 04/03/25 11:10 PM    Specimen: Blood   Result Value Ref Range    Glucose 102 (H) 65 - 99 mg/dL    BUN 30 (H) 8 - 23 mg/dL    Creatinine 0.98 0.57 - 1.00 mg/dL    Sodium 140 136 - 145 mmol/L    Potassium 4.2 3.5 - 5.2 mmol/L    Chloride 104 98 - 107 mmol/L    CO2 24.0 22.0 - 29.0 mmol/L    Calcium 9.4 8.6 - 10.5 mg/dL    Total Protein 6.9 6.0 - 8.5 g/dL    Albumin 4.1 3.5 - 5.2 g/dL    ALT (SGPT) 16 1 - 33 U/L    AST (SGOT) 22 1 - 32 U/L    Alkaline Phosphatase 80 39 - 117 U/L    Total Bilirubin <0.2 0.0 - 1.2 mg/dL    Globulin 2.8 gm/dL    A/G Ratio 1.5 g/dL    BUN/Creatinine Ratio 30.6 (H) 7.0 - 25.0    Anion Gap 12.0 5.0 - 15.0 mmol/L    eGFR 55.3 (L) >60.0 mL/min/1.73   Green Top (Gel)    Collection Time: 04/03/25 11:10 PM   Result Value Ref Range    Extra Tube Hold for add-ons.    Lavender Top    Collection Time: 04/03/25 11:10 PM   Result Value Ref Range    Extra Tube hold for add-on    Gold Top - SST    Collection Time: 04/03/25 11:10 PM   Result Value Ref Range    Extra Tube Hold for add-ons.    Light Blue Top    Collection Time: 04/03/25 11:10 PM   Result Value Ref Range    Extra Tube Hold for add-ons.    CBC Auto Differential    Collection Time: 04/03/25 11:10 PM    Specimen: Blood   Result Value Ref Range    WBC 8.20 3.40 - 10.80 10*3/mm3    RBC 3.49 (L) 3.77 - 5.28 10*6/mm3    Hemoglobin 11.5 (L) 12.0 - 15.9 g/dL    Hematocrit 33.6 (L) 34.0 - 46.6 %    MCV 96.3 79.0 - 97.0 fL    MCH 33.0 26.6 - 33.0 pg    MCHC 34.2 31.5 - 35.7 g/dL    RDW 11.6 (L) 12.3 - 15.4 %    RDW-SD 40.3 37.0 - 54.0 fl    MPV 9.2 6.0 - 12.0 fL    Platelets 264 140 - 450 10*3/mm3    Neutrophil % 57.9 42.7 - 76.0 %    Lymphocyte % 22.0 19.6 - 45.3 %    Monocyte % 13.3 (H) 5.0 - 12.0 %    Eosinophil % 5.4 0.3 - 6.2 %    Basophil % 1.2 0.0 - 1.5 %    Immature Grans % 0.2 0.0 - 0.5 %     Neutrophils, Absolute 4.75 1.70 - 7.00 10*3/mm3    Lymphocytes, Absolute 1.80 0.70 - 3.10 10*3/mm3    Monocytes, Absolute 1.09 (H) 0.10 - 0.90 10*3/mm3    Eosinophils, Absolute 0.44 (H) 0.00 - 0.40 10*3/mm3    Basophils, Absolute 0.10 0.00 - 0.20 10*3/mm3    Immature Grans, Absolute 0.02 0.00 - 0.05 10*3/mm3    nRBC 0.0 0.0 - 0.2 /100 WBC   Magnesium    Collection Time: 04/03/25 11:10 PM    Specimen: Blood   Result Value Ref Range    Magnesium 2.2 1.6 - 2.4 mg/dL       I ordered the above labs and independently reviewed the results.        RADIOLOGY  No Radiology Exams Resulted Within Past 24 Hours    I ordered the above noted radiological studies. Reviewed by me and discussed with radiologist.  See dictation for official radiology interpretation.      PROCEDURES    Procedures      MEDICATIONS GIVEN IN ER    Medications   losartan (COZAAR) tablet 100 mg (has no administration in time range)   furosemide (LASIX) tablet 20 mg (has no administration in time range)   pravastatin (PRAVACHOL) tablet 5 mg (has no administration in time range)   aspirin EC tablet 81 mg (has no administration in time range)   acetaminophen (TYLENOL) tablet 650 mg (has no administration in time range)   sodium chloride 0.9 % flush 10 mL (has no administration in time range)   sodium chloride 0.9 % flush 10 mL (has no administration in time range)   sodium chloride 0.9 % infusion 40 mL (has no administration in time range)   Pharmacy to Dose enoxaparin (LOVENOX) (has no administration in time range)   sennosides-docusate (PERICOLACE) 8.6-50 MG per tablet 2 tablet (has no administration in time range)     And   polyethylene glycol (MIRALAX) packet 17 g (has no administration in time range)     And   bisacodyl (DULCOLAX) EC tablet 5 mg (has no administration in time range)     And   bisacodyl (DULCOLAX) suppository 10 mg (has no administration in time range)   acetaminophen (TYLENOL) tablet 1,000 mg (1,000 mg Oral Given 4/3/25 6533)   ketorolac  (TORADOL) injection 15 mg (15 mg Intravenous Given 4/4/25 0001)   hydrALAZINE (APRESOLINE) injection 10 mg (10 mg Intravenous Given 4/4/25 0017)         ORDERS PLACED DURING THIS VISIT:  Orders Placed This Encounter   Procedures    [place] Draw    Comprehensive Metabolic Panel    CBC Auto Differential    Magnesium    BNP    Comprehensive Metabolic Panel    Magnesium    High Sensitivity Troponin T    CBC Auto Differential    Diet: Regular/House; Fluid Consistency: Thin (IDDSI 0)    Undress & Gown    Up With Assistance    Vital Signs    Up in Chair    Intake & Output    Weigh patient    Oral Care    Saline Lock & Maintain IV Access    Code Status and Medical Interventions: CPR (Attempt to Resuscitate); Full Support    IP General Consult (Use specialty-specific consult if known)    Telemetry Scan    Telemetry Scan    Insert Peripheral IV    Initiate Observation Status    CBC & Differential    Green Top (Gel)    Lavender Top    Gold Top - SST    Light Blue Top    CBC & Differential         PROGRESS, DATA ANALYSIS, CONSULTS, AND MEDICAL DECISION MAKING    All labs have been independently interpreted by me.  All radiology studies have been reviewed by me and discussed with radiologist dictating the report.   EKG's independently viewed and interpreted by me.  Discussion below represents my analysis of pertinent findings related to patient's condition, differential diagnosis, treatment plan and final disposition.    Differential diagnosis includes but is not limited to DVT, sciatica, musculoskeletal pain.    Clinical Scores:              ED Course as of 04/04/25 0622   Thu Apr 03, 2025 2339 WBC: 8.20 [TJ]   2339 Hemoglobin(!): 11.5 [TJ]   2339 Platelets: 264 [TJ]   2348 Creatinine: 0.98 [TJ]   2348 BUN(!): 30 [TJ]   2348 eGFR(!): 55.3 [TJ]   Fri Apr 04, 2025   0000 D/w Dr Thakur.  Happy to admit. [TJ]   0010 Assessment: Patient's pain improved with positioning.  Patient with elevated blood pressure we will treat with small  dose of antihypertensive. [TJ]      ED Course User Index  [TJ] Lester uDran MD             PPE: The patient wore a mask and I wore an N95 mask throughout the entire patient encounter.       AS OF 06:22 EDT VITALS:    BP - 148/80  HR - 101  TEMP - 97 °F (36.1 °C) (Oral)  O2 SATS - 96%        DIAGNOSIS  Final diagnoses:   Pain of left lower extremity         DISPOSITION  ED Disposition       ED Disposition   Decision to Admit    Condition   --    Comment   Level of Care: Telemetry [5]   Diagnosis: Left leg pain [320058]   Admitting Physician: TAYLER VALDERRAMA [[id number]]   Is patient appropriate for Inpatient Observation Unit?: No [0]                    Note Disclaimer: At [facility], we believe that sharing information builds trust and better relationships. You are receiving this note because you recently visited [facility]. It is possible you will see health information before a provider has talked with you about it. This kind of information can be easy to misunderstand. To help you fully understand what it means for your health, we urge you to discuss this note with your provider.         Lester Duran MD  04/04/25 0622

## 2025-04-04 NOTE — PROGRESS NOTES
[facility] Clinical Pharmacy Services: Enoxaparin Consult    Gladys BYRNE Timbo has a pharmacy consult to dose prophylactic enoxaparin per Dr. Thakur' request.     Indication: VTE Prophylaxis  Home Anticoagulation: none     Relevant clinical data and objective history reviewed:  89 y.o. female       There is no height or weight on file to calculate BMI.   Results from last 7 days   Lab Units 04/04/25  0540   PLATELETS 10*3/mm3 276     CrCl cannot be calculated (Unknown ideal weight.). probable crcl of 35-40 ml/min based on current scr & last recorded weight.    Assessment/Plan    Will start patient on 30mg subcutaneous every 24 hours, adjusted for renal function. Consult order will be discontinued but pharmacy will continue to follow.     Giancarlo Damon [facility]  Clinical Pharmacist

## 2025-04-04 NOTE — DISCHARGE PLACEMENT REQUEST
"Shi Izquierdo (89 y.o. Female)       Date of Birth   1935    Social Security Number       Address   [address] [place] [id number]    Home Phone   [phone]    MRN   [id number]       Mosque   Synagogue    Marital Status                               Admission Date   4/3/2025    Admission Type   Emergency    Admitting Provider   Gray Thakur MD    Attending Provider   Gray Thakur MD    Department, Room/Bed   [address], N626/1       Discharge Date       Discharge Disposition       Discharge Destination                                 Attending Provider: Gray Thakur MD    Allergies: Demerol [Meperidine], Latex, Morphine And Codeine, Dilaudid [Hydromorphone Hcl], Levofloxacin    Isolation: None   Infection: None   Code Status: CPR    Ht: 154.9 cm (61\")   Wt: --    Admission Cmt: None   Principal Problem: Left leg pain [M79.605]                   Active Insurance as of 4/3/2025       Primary Coverage       Payor Plan Insurance Group Employer/Plan Group    [employer] MEDICARE REPLACEMENT U[employer] MEDICARE ADVANTAGE GROUP [id number]       Payor Plan Address Payor Plan Phone Number Payor Plan Fax Number Effective Dates    [address] [address]   1/1/2024 - None Entered    [facility] [id number]         Subscriber Name Subscriber Birth Date Member ID       SHI IZQUIERDO 1935 [id number]                     Emergency Contacts        (Rel.) Home Phone Work Phone Mobile Phone    Jacklyn Izquierdo (Daughter) [phone] -- --    SANDHYA MEDRANO ( NIECE) (Relative) -- -- [phone]                "

## 2025-04-04 NOTE — PROGRESS NOTES
Left leg pain and swelling  Known LS disc disease    Review of Systems  Still left calf pain  Temp:  [97 °F (36.1 °C)-98.5 °F (36.9 °C)] 97 °F (36.1 °C)  Heart Rate:  [] 101  Resp:  [18] 18  BP: (148-207)/() 148/80  No intake/output data recorded.  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

## 2025-04-04 NOTE — ED NOTES
Nursing report ED to floor  Gladys Izquierdo  89 y.o.  female    HPI :  HPI  Stated Reason for Visit: Pt to er via pv with family. pt was directed to come to er by her MD. pt reports swelling and reddness to left leg thats become worse over the past day. pt took two asp pta  History Obtained From: patient  Precipitating Event(s): none    Chief Complaint  Chief Complaint   Patient presents with    Leg Pain       Admitting doctor:   Gray Thakur MD    Admitting diagnosis:   The encounter diagnosis was Pain of left lower extremity.    Code status:   Current Code Status       Date Active Code Status Order ID Comments User Context       Prior            Allergies:   Demerol [meperidine], Latex, Morphine and codeine, Dilaudid [hydromorphone hcl], and Levofloxacin    Isolation:   No active isolations    Intake and Output  No intake or output data in the 24 hours ending 04/04/25 0115    Weight:   There were no vitals filed for this visit.    Most recent vitals:   Vitals:    04/03/25 2235 04/04/25 0000 04/04/25 0010 04/04/25 0052   BP: (!) 207/104  (!) 189/98 172/80   BP Location:    Left arm   Patient Position:    Lying   Pulse:  101     Resp:       Temp:       SpO2:  94%         Active LDAs/IV Access:   Lines, Drains & Airways       Active LDAs       Name Placement date Placement time Site Days    Peripheral IV 04/03/25 2310 Posterior;Right Wrist 04/03/25  2310  Wrist  less than 1                    Labs (abnormal labs have a star):   Labs Reviewed   COMPREHENSIVE METABOLIC PANEL - Abnormal; Notable for the following components:       Result Value    Glucose 102 (*)     BUN 30 (*)     BUN/Creatinine Ratio 30.6 (*)     eGFR 55.3 (*)     All other components within normal limits    Narrative:     GFR Categories in Chronic Kidney Disease (CKD)      GFR Category          GFR (mL/min/1.73)    Interpretation  G1                     90 or greater         Normal or high (1)  G2                      60-89                Mild  decrease (1)  G3a                   45-59                Mild to moderate decrease  G3b                   30-44                Moderate to severe decrease  G4                    15-29                Severe decrease  G5                    14 or less           Kidney failure          (1)In the absence of evidence of kidney disease, neither GFR category G1 or G2 fulfill the criteria for CKD.    eGFR calculation 2021 CKD-EPI creatinine equation, which does not include race as a factor   CBC WITH AUTO DIFFERENTIAL - Abnormal; Notable for the following components:    RBC 3.49 (*)     Hemoglobin 11.5 (*)     Hematocrit 33.6 (*)     RDW 11.6 (*)     Monocyte % 13.3 (*)     Monocytes, Absolute 1.09 (*)     Eosinophils, Absolute 0.44 (*)     All other components within normal limits   MAGNESIUM - Normal   RAINBOW DRAW    Narrative:     The following orders were created for panel order [facility] Draw.  Procedure                               Abnormality         Status                     ---------                               -----------         ------                     Green Top (Gel)[[id number]]                                  Final result               Lavender Top[[id number]]                                     Final result               Gold Top - SST[[id number]]                                   Final result               Light Blue Top[[id number]]                                   Final result                 Please view results for these tests on the individual orders.   CBC AND DIFFERENTIAL    Narrative:     The following orders were created for panel order CBC & Differential.  Procedure                               Abnormality         Status                     ---------                               -----------         ------                     CBC Auto Differential[[id number]]        Abnormal            Final result                 Please view results for these tests on the individual orders.   GREEN TOP   LAVENDER  TOP   GOLD TOP - SST   LIGHT BLUE TOP       EKG:   No orders to display       Meds given in ED:   Medications   acetaminophen (TYLENOL) tablet 1,000 mg (1,000 mg Oral Given 4/3/25 7073)   ketorolac (TORADOL) injection 15 mg (15 mg Intravenous Given 25 0001)   hydrALAZINE (APRESOLINE) injection 10 mg (10 mg Intravenous Given 25 0017)       Imaging results:  No radiology results for the last day    Ambulatory status:   - assist x2    Social issues:   Social History     Socioeconomic History    Marital status:    Tobacco Use    Smoking status: Former     Current packs/day: 0.00     Average packs/day: 0.8 packs/day for 28.0 years (21.0 ttl pk-yrs)     Types: Cigarettes     Start date:      Quit date:      Years since quittin.2    Smokeless tobacco: Never    Tobacco comments:     less than 1 pack per day for 28 years    Substance and Sexual Activity    Alcohol use: No    Drug use: No       Peripheral Neurovascular  Peripheral Neurovascular (Adult)  Peripheral Neurovascular WDL: pulse assessment, .WDL except, neurovascular assessment lower  Pulse Assessment: radial  Calf Tenderness: left  Additional Documentation: Calf Tenderness (Row), Edema (Group)  Edema  Edema: leg, left  Leg, Left Edema: 1+ (Trace)  LLE Neurovascular Assessment  Temperature LLE: cool  Color LLE: red  Sensation LLE: tenderness present  RLE Neurovascular Assessment  Temperature RLE: cool  Color RLE: no discoloration    Neuro Cognitive  Neuro Cognitive (Adult)  Cognitive/Neuro/Behavioral WDL: orientation, WDL  Orientation: oriented x 4    Learning  Learning Assessment  Learning Readiness and Ability: no barriers identified  Education Provided  Person Taught: patient  Teaching Focus: symptom/problem overview  Education Outcome Evaluation: verbalizes understanding    Respiratory  Respiratory  Airway WDL: WDL  Respiratory WDL  Respiratory WDL: WDL, rhythm/pattern  Rhythm/Pattern, Respiratory: no shortness of breath reported,  depth regular, pattern regular, unlabored    Abdominal Pain       Pain Assessments  Pain (Adult)  (0-10) Pain Rating: Rest: 8  (0-10) Pain Rating: Activity: 10  Pain Location: calf  Pain Side/Orientation: left    NIH Stroke Scale       Kat Quiros RN  04/04/25 01:15 EDT

## 2025-04-04 NOTE — CASE MANAGEMENT/SOCIAL WORK
Discharge Planning Assessment  [facility]     Patient Name: Gladys Izquierdo  MRN: [id number]  Today's Date: 4/4/2025    Admit Date: 4/3/2025    Plan: Home with family and HH vs STR pending evals and progress   Discharge Needs Assessment       Row Name 04/04/25 1239       Living Environment    People in Home child(jass), adult    Name(s) of People in Home Dtr Jacklyn    Current Living Arrangements home    Potentially Unsafe Housing Conditions none    Primary Care Provided by self    Family Caregiver if Needed child(jass), adult    Family Caregiver Names Jacklyn    Quality of Family Relationships involved;helpful    Able to Return to Prior Arrangements yes       Resource/Environmental Concerns    Resource/Environmental Concerns none    Transportation Concerns none       Transition Planning    Patient/Family Anticipates Transition to home with family;home with help/services;inpatient rehabilitation facility    Patient/Family Anticipated Services at Transition home health care;skilled nursing    Transportation Anticipated family or friend will provide       Discharge Needs Assessment    Readmission Within the Last 30 Days no previous admission in last 30 days    Equipment Currently Used at Home cane, quad;rollator    Concerns to be Addressed adjustment to diagnosis/illness    Anticipated Changes Related to Illness inability to care for self    Equipment Needed After Discharge none                   Discharge Plan       Row Name 04/04/25 1240       Plan    Plan Home with family and HH vs STR pending evals and progress    Patient/Family in Agreement with Plan yes    Plan Comments Spoke with patient and nieces at bedside, introduced self, explained CCP role and verified face sheet and pharmacy information.  Pt lives with Jacklyn her daughter, in 1 multi-level home with no SHERICE and she resides on main floor. She is normally IADL's, uses no medical equipment but has cane and rollator she has been needing last couple days,  has no HH history but has been to [facility] in the past. She is hopeful to return home with family and they will transport if appropriate. Pt is open to HH or STR if needed, unsure cause of pain at this time, will need further testing, evaluations by therapy are pending, pt has no preference for HH agency if needed. Physical address is [address] [place] [address] for HH needs. If pt requires STR she will need precert, referral for HH intiated, if ready over weekend please call [phone] to coordinate dc needs CCP will follow - Deb CHATMAN                    Expected Discharge Date and Time       Expected Discharge Date Expected Discharge Time    Apr 7, 2025            Demographic Summary       Row Name 04/04/25 1239       General Information    Admission Type observation                   Functional Status       Row Name 04/04/25 1239       Functional Status    Usual Activity Tolerance excellent    Current Activity Tolerance moderate       Assessment of Health Literacy    Health Literacy Good       Functional Status, IADL    Medications independent    Meal Preparation independent    Housekeeping independent;assistive person    Laundry assistive person    Shopping assistive person    IADL Comments dtr helps if needed       Mental Status    General Appearance WDL WDL       Mental Status Summary    Recent Changes in Mental Status/Cognitive Functioning no changes                   Psychosocial    No documentation.                  Abuse/Neglect    No documentation.                  Legal       Row Name 04/04/25 1239       Financial/Legal    Who Manages Finances if Patient Unable dtr                   Substance Abuse    No documentation.                  Patient Forms    No documentation.                     Deb Newton RN

## 2025-04-05 VITALS
DIASTOLIC BLOOD PRESSURE: 85 MMHG | RESPIRATION RATE: 17 BRPM | SYSTOLIC BLOOD PRESSURE: 135 MMHG | OXYGEN SATURATION: 97 % | TEMPERATURE: 97.9 F | HEART RATE: 116 BPM

## 2025-04-05 PROBLEM — M79.605 LEFT LEG PAIN: Status: RESOLVED | Noted: 2025-04-04 | Resolved: 2025-04-05

## 2025-04-05 PROCEDURE — 96372 THER/PROPH/DIAG INJ SC/IM: CPT

## 2025-04-05 PROCEDURE — 25010000002 ENOXAPARIN PER 10 MG: Performed by: INTERNAL MEDICINE

## 2025-04-05 PROCEDURE — G0378 HOSPITAL OBSERVATION PER HR: HCPCS

## 2025-04-05 RX ADMIN — Medication 10 ML: at 08:57

## 2025-04-05 RX ADMIN — LOSARTAN POTASSIUM 100 MG: 100 TABLET, FILM COATED ORAL at 08:55

## 2025-04-05 RX ADMIN — ASPIRIN 81 MG: 81 TABLET, COATED ORAL at 08:55

## 2025-04-05 RX ADMIN — ENOXAPARIN SODIUM 30 MG: 100 INJECTION SUBCUTANEOUS at 09:00

## 2025-04-05 RX ADMIN — FUROSEMIDE 20 MG: 20 TABLET ORAL at 08:55

## 2025-04-05 NOTE — PLAN OF CARE
Problem: Adult Inpatient Plan of Care  Goal: Plan of Care Review  Flowsheets (Taken 4/5/2025 0354)  Progress: improving  Outcome Evaluation: No changes. AOx4. VSS. Pleasant. No c/o pain or discomfort. RA O2. Up w/ assist x1 w/ walker. Makes needs known. Possible d/c today. Call light within reach.  Goal: Absence of Hospital-Acquired Illness or Injury  Intervention: Identify and Manage Fall Risk  Flowsheets  Taken 4/5/2025 0247  Safety Promotion/Fall Prevention:   activity supervised   assistive device/personal items within reach   clutter free environment maintained   fall prevention program maintained   lighting adjusted   nonskid shoes/slippers when out of bed   room organization consistent   safety round/check completed  Taken 4/5/2025 0021  Safety Promotion/Fall Prevention:   activity supervised   assistive device/personal items within reach   clutter free environment maintained   fall prevention program maintained   lighting adjusted   nonskid shoes/slippers when out of bed   room organization consistent   safety round/check completed  Taken 4/4/2025 2220  Safety Promotion/Fall Prevention:   activity supervised   assistive device/personal items within reach   clutter free environment maintained   fall prevention program maintained   lighting adjusted   nonskid shoes/slippers when out of bed   room organization consistent   safety round/check completed  Taken 4/4/2025 2014  Safety Promotion/Fall Prevention:   activity supervised   assistive device/personal items within reach   clutter free environment maintained   fall prevention program maintained   lighting adjusted   nonskid shoes/slippers when out of bed   room organization consistent   safety round/check completed  Intervention: Prevent Skin Injury  Flowsheets  Taken 4/5/2025 0247  Body Position: position changed independently  Taken 4/5/2025 0021  Body Position: position changed independently  Taken 4/4/2025 2220  Body Position: position changed  independently  Taken 4/4/2025 2014  Body Position: position changed independently  Skin Protection:   incontinence pads utilized   transparent dressing maintained  Intervention: Prevent and Manage VTE (Venous Thromboembolism) Risk  Flowsheets (Taken 4/4/2025 0930 by Lissett Adler, RN)  VTE Prevention/Management: (lovenox, asa) other (see comments)  Intervention: Prevent Infection  Flowsheets (Taken 4/4/2025 2146 by Jody Mendes, PCT)  Infection Prevention:   environmental surveillance performed   hand hygiene promoted   rest/sleep promoted   single patient room provided  Goal: Optimal Comfort and Wellbeing  Intervention: Provide Person-Centered Care  Flowsheets (Taken 4/4/2025 2014)  Trust Relationship/Rapport:   care explained   choices provided   emotional support provided   empathic listening provided   questions answered   questions encouraged   reassurance provided   thoughts/feelings acknowledged  Goal: Readiness for Transition of Care  Intervention: Mutually Develop Transition Plan  Flowsheets (Taken 4/4/2025 1239 by Deb Newton RN)  Equipment Needed After Discharge: none  Equipment Currently Used at Home:   cane, quad   rollator  Anticipated Changes Related to Illness: inability to care for self  Transportation Anticipated: family or friend will provide  Transportation Concerns: none  Concerns to be Addressed: adjustment to diagnosis/illness  Readmission Within the Last 30 Days: no previous admission in last 30 days  Patient/Family Anticipated Services at Transition:   home health care   skilled nursing  Patient/Family Anticipates Transition to:   home with family   home with help/services   inpatient rehabilitation facility     Problem: Comorbidity Management  Goal: Maintenance of Asthma Control  Intervention: Maintain Asthma Symptom Control  Flowsheets (Taken 4/4/2025 1800 by Lissett Adler, RN)  Medication Review/Management: medications reviewed  Goal: Blood Pressure in Desired  Range  Intervention: Maintain Blood Pressure Management  Flowsheets (Taken 4/4/2025 1800 by Lissett Adler, RN)  Medication Review/Management: medications reviewed     Problem: Fall Injury Risk  Goal: Absence of Fall and Fall-Related Injury  Intervention: Identify and Manage Contributors  Flowsheets (Taken 4/4/2025 1800 by Lissett Adler, RN)  Medication Review/Management: medications reviewed  Intervention: Promote Injury-Free Environment  Flowsheets  Taken 4/5/2025 0247  Safety Promotion/Fall Prevention:   activity supervised   assistive device/personal items within reach   clutter free environment maintained   fall prevention program maintained   lighting adjusted   nonskid shoes/slippers when out of bed   room organization consistent   safety round/check completed  Taken 4/5/2025 0021  Safety Promotion/Fall Prevention:   activity supervised   assistive device/personal items within reach   clutter free environment maintained   fall prevention program maintained   lighting adjusted   nonskid shoes/slippers when out of bed   room organization consistent   safety round/check completed  Taken 4/4/2025 2220  Safety Promotion/Fall Prevention:   activity supervised   assistive device/personal items within reach   clutter free environment maintained   fall prevention program maintained   lighting adjusted   nonskid shoes/slippers when out of bed   room organization consistent   safety round/check completed  Taken 4/4/2025 2014  Safety Promotion/Fall Prevention:   activity supervised   assistive device/personal items within reach   clutter free environment maintained   fall prevention program maintained   lighting adjusted   nonskid shoes/slippers when out of bed   room organization consistent   safety round/check completed   Goal Outcome Evaluation:           Progress: improving  Outcome Evaluation: No changes. AOx4. VSS. Pleasant. No c/o pain or discomfort. RA O2. Up w/ assist x1 w/ walker. Makes needs known.  Possible d/c today. Call light within reach.

## 2025-04-05 NOTE — DISCHARGE SUMMARY
was admitted through the ER with left leg pain in the calf.  The concern was for a Deep vein thrombosis, since she was palably tender in the calf. The venous doppler was normal.She had evolving pain into the left knee and the left thigh.      X ray of the left knee showed a small effusion of the joint and no fracture or dislocation.    Orthopedic consultation was ordered, but patient has improved and the orthopedic consult was cancelled.   On exam , today , on discharge, she is not   Tender in the left calf and the knee joint  Has good range of motion, and she is walking   Ok.    She will be discharged today. I will see her in the  Office for follow up. Her symptoms may be due to the   Known diagnosis of LS disc disease    Left leg pain  LS spine disc disease    Gray Henry MD

## 2025-04-05 NOTE — NURSING NOTE
No barriers to discharge at this time. Pt ambulating to bathroom with cane and assistance from family with no reported issues or pain. Discussed with Dr. Thakur. AVS reviewed with pt, no further questions. Granddaughter at bedside to take pt home. Pt left with all personal belongings.

## 2025-04-05 NOTE — SIGNIFICANT NOTE
04/05/25 1035   OTHER   Discipline physical therapist   Rehab Time/Intention   Session Not Performed other (see comments)  (Spoke with RN who reports patient is d/c today. No skilled PT services needed at this time.)

## 2025-04-05 NOTE — CONSULTS
Orthopaedic Surgery  Consult Note  Joel Saini MD    [phone]    HPI:  Patient is a 89 y.o.female who presents with left leg pain and swelling after a fall while at home. She says that she did have pain to the left knee that resulted in difficulty with ambulation. She is now upon my exam ambulating back at her baseline with minimal pain about the knee. She uses a walker on occasion. She denies any swelling about the knee. Pain was centered about the knee cap when it occurred. Pain improved with rest.   MEDICAL HISTORY  Past Medical History:   Diagnosis Date    Aphasia     Asthma, cold induced     Cognitive impairment     DDD (degenerative disc disease), thoracolumbar     Essential hypertension     Hypertensive encephalopathy     Psoriasis      Past Surgical History:   Procedure Laterality Date    CEREBRAL ANEURYSM REPAIR  2000    CHOLECYSTECTOMY  2005    HYSTERECTOMY      with bladder repair    OTHER SURGICAL HISTORY      Repair 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 stages, and abdominal hernia repair    TONSILLECTOMY      age 7     Prior to Admission medications    Medication Sig Start Date End Date Taking? Authorizing Provider   Alcohol Swabs (Pharmacist Choice Alcohol) pads 1 each 2 (Two) Times a Day. 5/4/22   Prachi Freed MD   amLODIPine (NORVASC) 5 MG tablet Take 1 tablet by mouth Daily. 2/21/24   Paty Turcios MD   atorvastatin (LIPITOR) 10 MG tablet TAKE 1 TABLET EVERY DAY 9/4/24   Prachi Freed MD   Cholecalciferol (VITAMIN D3) 5000 UNITS tablet Take 1 tablet by mouth Daily.    Paty Turcios MD   DULoxetine (CYMBALTA) 60 MG capsule TAKE 1 CAPSULE EVERY DAY 1/15/24   Prachi Freed MD   EPINEPHrine (EPIPEN) 0.3 MG/0.3ML solution auto-injector injection 0.3 mL Daily As Needed. 2/12/16   Paty Turcios MD   glucose blood (Pharmacist Choice Autocode) test strip 1 each by Other route 2 (Two) Times a Day. Use as instructed 12/6/23   Prachi Freed MD   Immune Globulin, Human, 40  GM/400ML solution 40 g Every 30 (Thirty) Days. 9/3/15   ProviderPaty MD   metoprolol succinate XL (TOPROL-XL) 25 MG 24 hr tablet TAKE 1 TABLET EVERY DAY 24   Prachi Freed MD   olmesartan (BENICAR) 40 MG tablet Take 1 tablet by mouth Daily. 24   Prachi Freed MD   omeprazole (priLOSEC) 40 MG capsule TAKE 1 CAPSULE EVERY DAY 24   Prachi Freed MD   Pharmacist Choice Lancets misc Use 1 each 2 (Two) Times a Day. 23   Prachi Freed MD   Tirzepatide (Mounjaro) 15 MG/0.5ML solution pen-injector pen Inject 0.5 mL under the skin into the appropriate area as directed 1 (One) Time Per Week. 24   Prachi Freed MD   traMADol (ULTRAM) 50 MG tablet TAKE ONE TABLET BY MOUTH EVERY 8 HOURS AS NEEDED FOR MODERATE PAIN 23   Prachi Freed MD   Xarelto 20 MG tablet Take 1 tablet by mouth Daily. 23   Joe Gutierrez MD     Allergies   Allergen Reactions    Demerol [Meperidine]      B/P problems    Latex      blisters    Morphine And Codeine      Decreased b/p    Dilaudid [Hydromorphone Hcl] Rash    Levofloxacin Palpitations     Most Recent Immunizations   Administered Date(s) Administered    -influenza Vac Quardvalent Preservativ 2023    COVID-19 (PFIZER) Purple Cap Monovalent 2021    COVID-19 (UNSPECIFIED) 2020    FluMist 2-49yrs 2015    Fluad Quad 65+ 2022    Fluzone (or Fluarix & Flulaval for VFC) >6mos 10/15/2020    Fluzone High-Dose 65+YRS 10/10/2018    Hepatitis A 10/24/2018    Influenza Seasonal Injectable 2024    Pneumococcal Conjugate 13-Valent (PCV13) 2015    Pneumococcal Polysaccharide (PPSV23) 2009    Td (TDVAX) 2011    Tdap 2023    Zostavax 2009     Social History     Tobacco Use    Smoking status: Former     Current packs/day: 0.00     Average packs/day: 0.8 packs/day for 28.0 years (21.0 ttl pk-yrs)     Types: Cigarettes     Start date:      Quit date:      Years since quittin.2    Smokeless tobacco: Never     Tobacco comments:     less than 1 pack per day for 28 years    Substance Use Topics    Alcohol use: No      Social History     Substance and Sexual Activity   Drug Use No       VITALS: /85 (BP Location: Left arm, Patient Position: Sitting)   Pulse 116   Temp 97.9 °F (36.6 °C) (Oral)   Resp 17   SpO2 97%  There is no height or weight on file to calculate BMI.    PHYSICAL EXAM:   CONSTITUTIONAL: No acute distress  LUNGS: Equal chest rise, no shortness of air  CARDIOVASCULAR: palpable peripheral pulses  SKIN: no skin lesions in the area examined  LYMPH: no lymphadenopathy in the area examined  Musculoskeletal:   LLE   Skin intact  Knee ROM 0 -95 non painful   Able to DF PF at the ankle   Sensation is intact distally   Foot is wwp       RADIOLOGY REVIEW:   Left knee 3 view no fracture. Severe pf osteoarthritis   XR Knee 3 View Left  Result Date: 4/4/2025   As described.    This report was finalized on 4/4/2025 3:21 PM by Dr. Sukhi Funk M.D on Workstation: Robert Applebaum MD        LABS:   Results for the past 24 hours:   No results found for this or any previous visit (from the past 24 hours).    IMPRESSION:  Patient is a 89 y.o. female with left knee pain after a fall at home.     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 This will be treated conservatively with rest ice elevation and anti inflammatories as needed   Discussed with her fall precautions and using a walker.   Happy to see her in the office for follow up and consider injections.   please call/chat  with any questions or concerns.    Joel Saini MD   [facility]   [phone] - [place] Office  [phone] - [place] Office

## 2025-04-07 NOTE — CASE MANAGEMENT/SOCIAL WORK
Case Management Discharge Note      Final Note: home no needs         Selected Continued Care - Discharged on 4/5/2025 Admission date: 4/3/2025 - Discharge disposition: Home or Self Care      Destination    No services have been selected for the patient.                Durable Medical Equipment    No services have been selected for the patient.                Dialysis/Infusion    No services have been selected for the patient.                Home Medical Care    No services have been selected for the patient.                Therapy    No services have been selected for the patient.                Community Resources    No services have been selected for the patient.                Community & DME    No services have been selected for the patient.                    Transportation Services  Private: Car    Final Discharge Disposition Code: 01 - home or self-care

## 2025-06-16 ENCOUNTER — TRANSCRIBE ORDERS (OUTPATIENT)
Dept: ADMINISTRATIVE | Facility: HOSPITAL | Age: 89
End: 2025-06-16
Payer: MEDICARE

## 2025-06-16 DIAGNOSIS — Z12.31 BREAST CANCER SCREENING BY MAMMOGRAM: Primary | ICD-10-CM

## 2025-06-19 ENCOUNTER — TRANSCRIBE ORDERS (OUTPATIENT)
Dept: PHYSICAL THERAPY | Facility: CLINIC | Age: 89
End: 2025-06-19
Payer: MEDICARE

## 2025-06-19 DIAGNOSIS — M79.604 BILATERAL LEG PAIN: ICD-10-CM

## 2025-06-19 DIAGNOSIS — M79.605 BILATERAL LEG PAIN: ICD-10-CM

## 2025-06-19 DIAGNOSIS — M25.512 BILATERAL SHOULDER PAIN, UNSPECIFIED CHRONICITY: Primary | ICD-10-CM

## 2025-06-19 DIAGNOSIS — M25.511 BILATERAL SHOULDER PAIN, UNSPECIFIED CHRONICITY: Primary | ICD-10-CM

## 2025-07-06 NOTE — PROGRESS NOTES
[facility]    Physical Therapy Initial Evaluation and Plan of Care    Patient Name: Gladys Izquierdo          :  1935  Referring Physician: Gray Thakur MD  Diagnosis: Chronic left shoulder pain [M25.512, G89.29] ;   Date of Evaluation: 2025  ______________________________________________________________________    Subjective Evaluation    History of Present Illness  Mechanism of injury: LE / SHOULDER PAIN:  (L) UT and upper arm (can't sleep on it - Intermittent)  and Left leg (THIGH and KNEE ) -   This year insidious onset - severe pain ;  Balance is a problem -   Fallen 3 x this year;  1) fell down walking out back door and missed a 18 in step -2) In bathroom and just went down (don't know why); 3)  same as 2 ) - 4) Jumped out of bed too fast and fell over -   No injuries from falls -     C/O weakness (difficult rising, walking on stairs) -   No assessment of (L) UE -       MD said back was a mess - 5 yrs caring for disabled daughter in [place] - she passed Feb of this year -     2024 - to ER due to (L) leg pain and swelling in both legs (L>R)-  Negative Doppler (B) LEs      PMHX:   FALL and HIT HEAD 3/2021  C-DDD  CEREBRAL ANEURYSM REPAIR    Aphasia     Asthma, cold induced    Cognitive impairment    DDD (degenerative disc disease), thoracolumbar    Essential hypertension    Hypertensive encephalopathy   Repair 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 stages, and abdominal hernia repair        Patient Occupation: Retired - Quality of life: fair    Pain  Current pain ratin  At worst pain ratin  Quality: Sharp; Ache.  Alleviating factors: Pain cream, CBD oil - Lay down;  Exacerbated by: First thing in AM worst -  being active - getting out of chair; Rasing OH; Lifting items of wt -  (L) SL;  LLE:  getting up; walking; Stairs;    Social Support  Patient lives at: Home w/ stairs, but don't have to use them ; Lives w/ Daughter -    Hand dominance: right    Diagnostic Tests  X-ray:  abnormal ((L) KNEE - no assessment of (L) shoulder)    Treatments  No previous or current treatments  Patient Goals  Patient/family treatment goals: Decreased pain; Improved mobility, endurance and improved ADLs -     (L) KNEE XRAYS REPORT 4-4-2025   3 VIEWS OF THE LEFT KNEE  COMPARISON: None available  FINDINGS:  No acute fracture, erosion, or dislocation is identified. Prominent  lateral patellofemoral joint space narrowing is seen. Mild knee effusion  is present.  ___________________________________________________  Objective          Postural Observations    Additional Postural Observation Details  Rounded shoulder / Fwd head posture w/ ABD scap and increased Thoracic kyphosis w/ IR UE's  Atrophy of infraspinatus > supraspinatus fossa (L) -   Hypertrophy supraclavicular region (L)   Standing w/ WS to (R) UE and flexed posture -     Tenderness     Additional Tenderness Details  (L) SHOULDER:  Very tender LH biceps, ant/lat, lat shoulder  Very tender / tight (L) UT, Scalenes, over 1st rib (L)     Active Range of Motion     Additional Active Range of Motion Details  (L) FE: 90-deg;  IRB to (L) lateral hip; ERB to UT (L)  (R) WFL     Strength/Myotome Testing     Additional Strength Details  (L) SHOULDER:   Very weak and painful ABD 3+/5, ERS  4-/5;    ERS 4/5; IRS 4+/5    Tests     Additional Tests Details  LEFT SHOULDER:    (-) Drop arm;   (+) Empty can, SPEEDS, Bristol's  (+) Elevated 1st rib (L)         TREATMENT:   MANUAL THERAPY:   []     []     []       EXERCISES:   []  SHOULDER PULLEYS - PFE (L)  [x]   SCAP RETRACTION  [x]   PASSIVE FE w/ WAND (HANDS TOGETHER) x 15  [x]   SHOULDER PRESS w/ WAND (HANDS TOGETHER) x 15  [x]     []     [x]   FABRICATED HEP and REVIEWED w/ PATIENT    FUNCTIONAL ACTIVITIES:   TAPING / BRACING: NA  ANATOMY / DYSFUNCTION EDUCATION  Jt protection, ADL modification; Posture and Ergonomics / Body Mechanics  ___________________________________________________  Assessment & Plan        Assessment  Impairments: abnormal or restricted ROM, activity intolerance, impaired physical strength, lacks appropriate home exercise program and pain with function   Functional limitations: carrying objects, lifting, sleeping, pulling, pushing, uncomfortable because of pain, moving in bed, reaching behind back, reaching overhead and unable to perform repetitive tasks   Assessment details: 89 yo female: (L) Shoulder pain; probable RTC tear;  (L) UT / myofascial pain (probable compensation for shoulder pain / weakness) -   Pt may benefit from further assessment (I.e. MRI, Ortho referral, etc) if no improvement w/ PT -     GOALS:   SHORT TERM GOALS: 4-6 weeks:    1) HEP Initiated;  including ROM/ flexibility, strengthening/stabilization, proprioception / NMR, therapeutic activities, etc.  2) Pain decreased 50%: at rest and w/ UE ADLs including reaching to and above shoulder level -   3) AROM  increased (L) SHOULDER FE improved to 110-deg to allow improved UE ADLs, including reaching OH, etc.  4) Improved SPADI > 10pts  5) Pt demonstrates improved postural positioning w/ verbal / tactile cues -     LONG TERM GOALS: 8-12 weeks (or at time of DISCHARGE):   1) (I) HEP;   2) AROM WFL and pain free;to allow normal UE ADLs, including reaching OH, IRB, Lifting items of wt, etc   3) Strength / endurance to be able to perform all UE ADL's, job, and hobby-related activities w/modifications prn;   4) Functional Test SPADI score improved to < 30.   5) Pt demonstrates improved postural positioning w/ minimal cuing -       Prognosis: good    Plan  Therapy options: will be seen for skilled therapy services  Planned therapy interventions: abdominal trunk stabilization, flexibility, functional ROM exercises, home exercise program, manual therapy, neuromuscular re-education, postural training, soft tissue mobilization, strengthening, stretching and therapeutic activities (Modalities prn; Taping / bracing prn)  Frequency: 2x  week  Duration in weeks: 12  Treatment plan discussed with: patient  Plan details: EVALUATE (L) KNEE / LE at later date -       ___________________________________________________  Manual Therapy:         mins  28040;  Therapeutic Exercise:    10     mins  85171;     Neuromuscular Darron:        mins  81129;    Therapeutic Activity:     15     mins  04773;   Self Care:                           mins  43789  Ultrasound:          mins  95981;  Iontophoresis:          mins  32174;    Electrical Stimulation:         mins  69675 ( );  Mechanical Traction:          mins  26789  Dry Needling          mins self-pay    Eval:   20   mins    Timed Treatment:   25   mins                  Total Treatment:     45   mins    PT SIGNATURE:     Jayce Curry, CARLITOS  DATE TREATMENT INITIATED: 7/6/2025  ___________________________________________________  Initial Certification  Certification Period: 10/4/2025  I certify that the therapy services are furnished while this patient is under my care.  The services outlined above are required by this patient, and will be reviewed every 90 days.     PHYSICIAN: ________________________________  DATE: ______  Gray Thakur MD        Please sign and return via fax to  [phone]. Thank you, [facility].  ______________________________________________________________________  [address] [place] [address]  Phone: [phone] Fax: [phone]    Access Code: [email or web address]  URL: [email or web address]  Date: 07/07/2025  Prepared by: Gray Curry    Exercises  - Seated Scapular Retraction  - 2-3 x daily - 7 x weekly - 1 sets - 10-15 reps - 5-10s hold  - Supine Shoulder Press with Dowel  - 2 x daily - 7 x weekly - 1 sets - 15-20 reps - 3-5 sec hold  - Supine Shoulder Flexion Extension AAROM with Dowel  - 2 x daily - 7 x weekly - 1 sets - 10-15 reps - 5 sec hold

## 2025-07-07 ENCOUNTER — TREATMENT (OUTPATIENT)
Dept: PHYSICAL THERAPY | Facility: CLINIC | Age: 89
End: 2025-07-07
Payer: MEDICARE

## 2025-07-07 DIAGNOSIS — R68.89 IMPAIRED FUNCTION OF UPPER EXTREMITY: ICD-10-CM

## 2025-07-07 DIAGNOSIS — G89.29 CHRONIC LEFT SHOULDER PAIN: Primary | ICD-10-CM

## 2025-07-07 DIAGNOSIS — R29.898 WEAKNESS OF LEFT ARM: ICD-10-CM

## 2025-07-07 DIAGNOSIS — M25.512 CHRONIC LEFT SHOULDER PAIN: Primary | ICD-10-CM

## 2025-07-07 DIAGNOSIS — M79.18 MYOFASCIAL PAIN: ICD-10-CM

## 2025-07-15 ENCOUNTER — TREATMENT (OUTPATIENT)
Dept: PHYSICAL THERAPY | Facility: CLINIC | Age: 89
End: 2025-07-15
Payer: MEDICARE

## 2025-07-15 DIAGNOSIS — M79.18 MYOFASCIAL PAIN: ICD-10-CM

## 2025-07-15 DIAGNOSIS — G89.29 CHRONIC LEFT SHOULDER PAIN: Primary | ICD-10-CM

## 2025-07-15 DIAGNOSIS — R68.89 IMPAIRED FUNCTION OF UPPER EXTREMITY: ICD-10-CM

## 2025-07-15 DIAGNOSIS — M25.512 CHRONIC LEFT SHOULDER PAIN: Primary | ICD-10-CM

## 2025-07-15 DIAGNOSIS — R29.898 WEAKNESS OF LEFT ARM: ICD-10-CM

## 2025-07-15 NOTE — PROGRESS NOTES
Physical Therapy Daily Note    Patient Name: Gladys Izquierdo         :  1935  Referring Physician: Gray Thakur MD  Treatment Date: 7/15/2025  Date of Initial Visit: No linked episodes    Visit Diagnoses:    ICD-10-CM ICD-9-CM   1. Chronic left shoulder pain  M25.512 719.41    G89.29 338.29   2. Impaired function of upper extremity  R68.89 V49.5   3. Weakness of left arm  R29.898 729.89   4. Myofascial pain  M79.18 729.1     Patient seen for Visit count could not be calculated. Make sure you are using a visit which is associated with an episode. sessions  _____________________________________________________    Subjective   Gladys Izquierdo reports: Pulleys feel much better than wand OH -     Objective   Pt demonstrates improved posture w/ cuing verbal and tactile _ Active elevation very painful, even in supine -     TREATMENT:   MANUAL THERAPY:   []     []     []       EXERCISES:   [x]  SHOULDER PULLEYS - PFE (L)  [x]   SCAP RETRACTION  [x]   PASSIVE FE w/ WAND (HANDS TOGETHER) x 15  [x]   SHOULDER PRESS w/ WAND (HANDS TOGETHER) x 15:   [x]   SL Shoulder ERS w/ Towel roll under arm (L) x 15   [x]   SCAP ROWS - yellow x 20  []     []     []    []     []     []     []     []      [x]   UPDATED HEP and REVIEWED w/ PATIENT     [x]   NMR: Verbal and tactile cues to facilitate core, posture,  shoulder, scap, UE musculature statically and dynamically. -  proprioception activities -  -     Functional / Therapeutic Activities:    TAPING / BRACING: NA  SEE EXERCISEs -   Discussed pursuing a referral to ortho to assess her shoulder -   Instructed Pt on ordering shoulder pulleys and instructed in use -     Assessment/Plan  89 yo female: (L) Shoulder pain; probable RTC tear;  (L) UT / myofascial pain (probable compensation for shoulder pain / weakness) -   Pt WOULD benefit from further assessment (I.e. MRI, Ortho referral, etc) if no improvement w/ PT -     Progress strengthening /stabilization /functional activity        _________________________________________________    Manual Therapy:                 mins  12758;  Therapeutic Exercise:    21    mins  83929;     Neuromuscular Darron:   07     mins  13561;    Therapeutic Activity:     10      mins  61024;     Ultrasound:                          mins  32495;  Iontophoresis:                     mins  91273;    Electrical Stimulation:         mins  11399 ( );  Mechanical Traction:          mins  40945  Dry Needling                       mins self-pay     Timed Treatment:   38    mins                  Total Treatment:     38    mins        Jayce Curry, PT  Physical Therapist  Lic # [id number]    Access Code: [email or web address]  URL: [email or web address]  Date: 07/15/2025  Prepared by: Gray Curry    Exercises  - Seated Scapular Retraction  - 2-3 x daily - 7 x weekly - 1 sets - 10-15 reps - 5-10s hold  - Supine Shoulder Press with Dowel  - 2 x daily - 7 x weekly - 1 sets - 15-20 reps - 3-5 sec hold  - Supine Shoulder Flexion Extension AAROM with Dowel  - 2 x daily - 7 x weekly - 1 sets - 10-15 reps - 5 sec hold  - SEATED SHOULDER PULLEY FLEXION  - 2 x daily - 7 x weekly - 1 sets - 20 reps - 10 sec hold  - SIDELYING SHOULDER ER w/ TOWEL under arm  - 1 x daily - 7 x weekly - 1-2 sets - 10-15 reps - 3-5 hold  - Standing Shoulder Row with Anchored Resistance  - 1 x daily - 3-4 x weekly - 2 sets - 15 reps - 3 s hold

## 2025-07-17 ENCOUNTER — TREATMENT (OUTPATIENT)
Dept: PHYSICAL THERAPY | Facility: CLINIC | Age: 89
End: 2025-07-17
Payer: MEDICARE

## 2025-07-17 DIAGNOSIS — R68.89 IMPAIRED FUNCTION OF UPPER EXTREMITY: ICD-10-CM

## 2025-07-17 DIAGNOSIS — M25.512 CHRONIC LEFT SHOULDER PAIN: Primary | ICD-10-CM

## 2025-07-17 DIAGNOSIS — M79.18 MYOFASCIAL PAIN: ICD-10-CM

## 2025-07-17 DIAGNOSIS — G89.29 CHRONIC LEFT SHOULDER PAIN: Primary | ICD-10-CM

## 2025-07-17 DIAGNOSIS — R29.898 WEAKNESS OF LEFT ARM: ICD-10-CM

## 2025-07-17 NOTE — PROGRESS NOTES
Physical Therapy Daily Note    Patient Name: Gladys Izquierdo         :  1935  Referring Physician: Gray Thakur MD  Treatment Date: 2025  Date of Initial Visit: Type: THERAPY  Noted: 2025    Visit Diagnoses:    ICD-10-CM ICD-9-CM   1. Chronic left shoulder pain  M25.512 719.41    G89.29 338.29   2. Impaired function of upper extremity  R68.89 V49.5   3. Weakness of left arm  R29.898 729.89   4. Myofascial pain  M79.18 729.1     Patient seen for 3 sessions  _____________________________________________________    Subjective   Gladys Izquierdo reports: PT helping - less pain - less aware of it -     Objective   Improved PROM / mobility     TREATMENT:   MANUAL THERAPY:   []     []     []       EXERCISES:   [x]  SHOULDER PULLEYS - PFE (L)  [x]   SCAP RETRACTION  [x]   PASSIVE FE w/ WAND (HANDS TOGETHER) x 15  [x]   SHOULDER PRESS w/ WAND (HANDS TOGETHER) x 15:   [x]   SL Shoulder ERS w/ Towel roll under arm (L) x 15   [x]   SCAP ROWS - yellow x 20  []     []     []    []     []     []     []     []      []   UPDATED HEP and REVIEWED w/ PATIENT     [x]   NMR: Verbal and tactile cues to facilitate core, posture,  shoulder, scap, UE musculature statically and dynamically. -  proprioception activities -  -      Functional / Therapeutic Activities:    TAPING / BRACING: NA  SEE EXERCISEs -   Discussed pursuing a referral to ortho to assess her shoulder -   Instructed Pt on ordering shoulder pulleys and instructed in use -          Assessment/Plan  89 yo female: (L) Shoulder pain; probable RTC tear;  (L) UT / myofascial pain (probable compensation for shoulder pain / weakness) -   Pt WOULD benefit from further assessment (I.e. MRI, Ortho referral, etc) if no improvement w/ PT -     Progress strengthening /stabilization /functional activity - Eval LLE next session        _________________________________________________    Manual Therapy:                 mins  94801;  Therapeutic Exercise:    23    mins  48039;      Neuromuscular Darron:   08     mins  52740;    Therapeutic Activity:     08      mins  34782;     Ultrasound:                          mins  03421;  Iontophoresis:                     mins  79981;    Electrical Stimulation:         mins  35096 ( );  Mechanical Traction:          mins  71616  Dry Needling                       mins self-pay     Timed Treatment:   39    mins                  Total Treatment:     39    mins        Jayce Curry PT  Physical Therapist  Lic # [id number]

## 2025-07-21 ENCOUNTER — TREATMENT (OUTPATIENT)
Dept: PHYSICAL THERAPY | Facility: CLINIC | Age: 89
End: 2025-07-21
Payer: MEDICARE

## 2025-07-21 ENCOUNTER — HOSPITAL ENCOUNTER (OUTPATIENT)
Dept: MAMMOGRAPHY | Facility: HOSPITAL | Age: 89
Discharge: HOME | End: 2025-07-21
Admitting: INTERNAL MEDICINE
Payer: MEDICARE

## 2025-07-21 DIAGNOSIS — M25.562 CHRONIC PAIN OF LEFT KNEE: Primary | ICD-10-CM

## 2025-07-21 DIAGNOSIS — Z74.09 IMPAIRED FUNCTIONAL MOBILITY, BALANCE, GAIT, AND ENDURANCE: ICD-10-CM

## 2025-07-21 DIAGNOSIS — Z12.31 BREAST CANCER SCREENING BY MAMMOGRAM: ICD-10-CM

## 2025-07-21 DIAGNOSIS — G89.29 CHRONIC PAIN OF LEFT KNEE: Primary | ICD-10-CM

## 2025-07-21 PROCEDURE — 77067 SCR MAMMO BI INCL CAD: CPT

## 2025-07-21 PROCEDURE — 97162 PT EVAL MOD COMPLEX 30 MIN: CPT | Performed by: PHYSICAL THERAPIST

## 2025-07-21 PROCEDURE — 97530 THERAPEUTIC ACTIVITIES: CPT | Performed by: PHYSICAL THERAPIST

## 2025-07-21 PROCEDURE — 77063 BREAST TOMOSYNTHESIS BI: CPT

## 2025-07-21 NOTE — PROGRESS NOTES
[facility]    Physical Therapy Initial Evaluation and Plan of Care    Patient Name: Gladys Izquierdo          :  1935  Referring Physician: Gray Thakur MD  Diagnosis: Chronic pain of left knee [M25.562, G89.29]    Date of Evaluation: 2025  ______________________________________________________________________    Subjective Evaluation    History of Present Illness  Mechanism of injury: (L) KNEE / Leg;  Long history of worsening knee pain - worse over last year - Can' do stairs -   No ortho, injections, etc -   Denies buckling injuries -   H/O falls w/o injury - most recently 2-3 - went out the back door forgetting there were steps and fell and then 2x in bathroom - went down w/o knowing why all within the last year -   No noted increased swelling -       Patient Occupation: Retired - active - Pain  Current pain ratin  At worst pain ratin  Location: Ant, posterior knee (L)  Alleviating factors: Rest; Tylenol -  Exacerbated by: Stairs, rising, squatting; off commode - out of car.  Progression: worsening    Social Support  Patient lives at: Home w/ stairs - lives w/ daughter who works at home -    Diagnostic Tests  X-ray: abnormal    Treatments  No previous or current treatments  Patient Goals  Patient/family treatment goals: Decrease pain - Mobility, strength, endurance to allow ADls and hobby activities -       ___________________________________________________  Objective          Observations     Additional Knee Observation Details  Pt ambulates w/ st cane - antalgic gait - decreased WB-ing LLE -   Genu valgus L>R - Trendelenburg w/ LLE SLS -       Tenderness     Additional Tenderness Details  Tender infrapatellar region, Med/lat PF jt - Medial jt line, gastroc mm w/ TPs -     Active Range of Motion   Left Knee   Flexion: 117 degrees   Extension: 15 degrees with pain    Right Knee   Normal active range of motion    Strength/Myotome Testing     Left Knee   Flexion: 4+  Extension:  4  Quadriceps contraction: fair    Right Knee   Normal strength    Additional Strength Details  HF 4/5     Tests     Additional Tests Details  (+) STEP UP  (-) ANT/POST Drawer  (-) VALGUS / VARUS   (-) McMURRAYS -         TREATMENT:   MANUAL THERAPY:   []     []     []       EXERCISES:   []     []     []     []     []     []   FABRICATED HEP and REVIEWED w/ PATIENT    FUNCTIONAL ACTIVITIES:   TAPING / BRACING: K-tape to Unload PF jt and prevent lateral tracking (L) knee  ANATOMY / DYSFUNCTION EDUCATION  Jt protection, ADL modification; Posture and Ergonomics / Body Mechanics  Reviewed XRAYS - Sit lat sitting patella w/ bone-on-bone (sunrise view and lat view) , etc - Possible Pat marisol (ant view)  ___________________________________________________  Assessment & Plan       Assessment  Assessment details: 89 yo female: Chronic knee pain / OA - Severe PF jt arthritis -   Pt would benefit from ortho referral - and further intervention -     Goals;   SHORT TERM GOALS: 4-6 weeks:    1) HEP Initiated; including flexibility, strengthening/stabilization, proprioception / NMR, therapeutic activities, etc.  2) Pain decreased 50%: w/ knee taped  3) AROM  increased:  (L) knee extension to 10-deg to allow improved functional ability including walking, stairs, rising  4) Pt demonstrates improved postural positioning / ergonomics / body mechanics w/ verbal / tactile cuing -;       LONG TERM GOALS: 8-12 weeks (or at time of DISCHARGE):   1) (I) HEP;   2) AROM WFL and pain free;   3) Strength / endurance to be able to perform all ADL's and job-related activities w/o restrictions;   4) Pt demonstrates improved postural positioning / ergonomics / body mechanics w/ minimal cuing  5) Functional Test LEFS score improved to > 55/80      Prognosis: fair    Plan  Therapy options: will be seen for skilled therapy services  Planned therapy interventions: gait training, functional ROM exercises, home exercise program, joint mobilization, manual  therapy, neuromuscular re-education, postural training, strengthening, stretching and therapeutic activities (Modalities prn; Taping prn;)  Frequency: 2x week  Duration in weeks: 8  Treatment plan discussed with: patient      ___________________________________________________  Manual Therapy:         mins  60908;  Therapeutic Exercise:         mins  43835;     Neuromuscular Darron:        mins  11331;    Therapeutic Activity:     25     mins  45462;   Self Care:                           mins  24111  Ultrasound:          mins  04568;  Iontophoresis:          mins  90522;    Electrical Stimulation:         mins  93486 ( );  Mechanical Traction:          mins  93274  Dry Needling          mins self-pay    Eval:   20   mins    Timed Treatment:   25   mins                  Total Treatment:     45   mins    PT SIGNATURE:     Jayce Curry, PT  DATE TREATMENT INITIATED: 7/21/2025  ___________________________________________________  Initial Certification  Certification Period: 10/19/2025  I certify that the therapy services are furnished while this patient is under my care.  The services outlined above are required by this patient, and will be reviewed every 90 days.     PHYSICIAN: ________________________________  DATE: ______  Gray Thakur MD        Please sign and return via fax to  [phone]. Thank you, [facility].  ______________________________________________________________________  [address] [place] [address]  Phone: [phone] Fax: [phone]

## 2025-07-24 ENCOUNTER — TREATMENT (OUTPATIENT)
Dept: PHYSICAL THERAPY | Facility: CLINIC | Age: 89
End: 2025-07-24
Payer: MEDICARE

## 2025-07-24 DIAGNOSIS — M25.562 CHRONIC PAIN OF LEFT KNEE: Primary | ICD-10-CM

## 2025-07-24 DIAGNOSIS — M79.18 MYOFASCIAL PAIN: ICD-10-CM

## 2025-07-24 DIAGNOSIS — R29.898 WEAKNESS OF LEFT ARM: ICD-10-CM

## 2025-07-24 DIAGNOSIS — G89.29 CHRONIC PAIN OF LEFT KNEE: Primary | ICD-10-CM

## 2025-07-24 DIAGNOSIS — M25.512 CHRONIC LEFT SHOULDER PAIN: ICD-10-CM

## 2025-07-24 DIAGNOSIS — G89.29 CHRONIC LEFT SHOULDER PAIN: ICD-10-CM

## 2025-07-24 DIAGNOSIS — Z74.09 IMPAIRED FUNCTIONAL MOBILITY, BALANCE, GAIT, AND ENDURANCE: ICD-10-CM

## 2025-07-24 DIAGNOSIS — R68.89 IMPAIRED FUNCTION OF UPPER EXTREMITY: ICD-10-CM

## 2025-07-24 NOTE — PROGRESS NOTES
Physical Therapy Daily Note    Patient Name: Gladys Izquierdo         :  1935  Referring Physician: Gray Thakur MD  Treatment Date: 2025  Date of Initial Visit: Type: THERAPY  Noted: 2025    Visit Diagnoses:    ICD-10-CM ICD-9-CM   1. Chronic pain of left knee  M25.562 719.46    G89.29 338.29   2. Impaired functional mobility, balance, gait, and endurance  Z74.09 V49.89   3. Chronic left shoulder pain  M25.512 719.41    G89.29 338.29   4. Weakness of left arm  R29.898 729.89   5. Impaired function of upper extremity  R68.89 V49.5   6. Myofascial pain  M79.18 729.1     Patient seen for 5 sessions  _____________________________________________________    Subjective   Gladys Izquierdo reports: tape very helpful - very difficult to get up out of a chair / commode -     Objective   Pt ambulating w/ sm quad cane RUE -     TREATMENT:   MANUAL THERAPY:   [x]   MEDIAL MOBS Gd lll w/ end range oscillations and prolonged holds to reduce lateral posturing of patella - (L)  []     []       EXERCISES:   [x]   SAQ w/ ECC lowering x 20  [x]   GLUTE SETS (B) / R-L  [x]   Beginner bridge w/ glute facil  [x]   SL CLAMS (L) 15/5 sec ea  [x]  SIT / STAND (bed 23in from floor) hands on thighs as needed -   [x]   3 in step up (green step) (L) w/ ue support on (R) side x 15  []     []     []     [x]   NUSTEP; seat 6 L4 x 5 min   [x]   UPDATED HEP and REVIEWED w/ PATIENT     [x]   NMR: Verbal and tactile cues to facilitate core, posture,  quad / vmo, glute, hip, LE musculature statically and dynamically. - Balance / proprioception activities -  -     Functional / Therapeutic Activities:    TAPING / BRACING:   [x]   1) K-tape to Unload PF jt (L) knee  [x]   2) K-tape to prevent lateral tracking (L) knee   Jt protection, ADL modification; Posture and      Assessment/Plan   89 yo female: Chronic knee pain / OA - Severe PF jt arthritis -   Pt would benefit from ortho referral - and further intervention -   Taping  helpful in reducing pain and allowing improved gait / function -     Progress strengthening /stabilization /functional activity        _________________________________________________    Manual Therapy:            05     mins  62021;  Therapeutic Exercise:    12    mins  24341;     Neuromuscular Darron:   05     mins  05215;    Therapeutic Activity:     20      mins  14458;     Ultrasound:                          mins  94144;  Iontophoresis:                     mins  40003;    Electrical Stimulation:         mins  21197 ( );  Mechanical Traction:          mins  77848  Dry Needling                       mins self-pay     Timed Treatment:   42    mins                  Total Treatment:     42    mins        Jayce Curry PT  Physical Therapist  Lic # [id number]

## 2025-07-27 NOTE — PROGRESS NOTES
Physical Therapy Daily Note    Patient Name: Gladys Izquierdo         :  1935  Referring Physician: Gray Thakur MD  Treatment Date: 2025  Date of Initial Visit: Type: THERAPY  Noted: 2025    Visit Diagnoses:    ICD-10-CM ICD-9-CM   1. Chronic pain of left knee  M25.562 719.46    G89.29 338.29   2. Impaired functional mobility, balance, gait, and endurance  Z74.09 V49.89   3. Chronic left shoulder pain  M25.512 719.41    G89.29 338.29   4. Weakness of left arm  R29.898 729.89   5. Impaired function of upper extremity  R68.89 V49.5   6. Myofascial pain  M79.18 729.1     Patient seen for 6 sessions  _____________________________________________________    Subjective   Gladys Izquierdo reports: taping helpful in decreasing her knee pain -     Objective   Pt ambulating w/ st cane - Patella sitting laterally (L) -     TREATMENT:   MANUAL THERAPY:   [x]   MEDIAL MOBS Gd lll w/ end range oscillations and prolonged holds to reduce lateral posturing of patella - (L)  []     []        EXERCISES:   [x]   SAQ w/ ECC lowering x 20  [x]   GLUTE SETS (B) / R-L  [x]   Beginner bridge w/ glute facil  [x]   SL CLAMS (L) 15/5 sec ea  [x]  SIT / STAND (bed 22in from floor) without hands, but hands on thighs as needed -   [x]   3 in step up (green step) (L) w/ ue support on (R) side x 15  []   Lat Step up  []   WOODROW HIP ABD  []   WOODROW LEG PRESS  []   SIDE STEPPING  []   MONSTER WALK fwd/retro  []   SLS  []   []   []   []   []     [x]   NUSTEP; seat 6 L9 x 8 min   []   UPDATED HEP and REVIEWED w/ PATIENT     [x]   NMR: Verbal and tactile cues to facilitate core, posture,  quad / vmo, glute, hip, LE musculature statically and dynamically. - Balance / proprioception activities -  -      Functional / Therapeutic Activities:    TAPING / BRACING:   [x]   1) K-tape to Unload PF jt (L) knee  [x]   2) K-tape to prevent lateral tracking (L) knee   SEE EXERCISEs -     Assessment/Plan   89 yo female: Chronic knee pain / OA -  Severe PF jt arthritis -   Pt would benefit from ortho referral - and further intervention -   Taping helpful in reducing pain and allowing improved gait / function -       Progress strengthening /stabilization /functional activity       _________________________________________________    Manual Therapy:            05     mins  00095;  Therapeutic Exercise:    20    mins  01844;     Neuromuscular Darron:   05     mins  77336;    Therapeutic Activity:     12      mins  00200;     Ultrasound:                          mins  65810;  Iontophoresis:                     mins  54479;    Electrical Stimulation:         mins  28021 ( );  Mechanical Traction:          mins  61699  Dry Needling                       mins self-pay     Timed Treatment:   42    mins                  Total Treatment:     42    mins        Jayce Curry PT  Physical Therapist  Lic # [id number]

## 2025-07-28 ENCOUNTER — TREATMENT (OUTPATIENT)
Dept: PHYSICAL THERAPY | Facility: CLINIC | Age: 89
End: 2025-07-28
Payer: MEDICARE

## 2025-07-28 DIAGNOSIS — M25.512 CHRONIC LEFT SHOULDER PAIN: ICD-10-CM

## 2025-07-28 DIAGNOSIS — M79.18 MYOFASCIAL PAIN: ICD-10-CM

## 2025-07-28 DIAGNOSIS — G89.29 CHRONIC PAIN OF LEFT KNEE: Primary | ICD-10-CM

## 2025-07-28 DIAGNOSIS — M25.562 CHRONIC PAIN OF LEFT KNEE: Primary | ICD-10-CM

## 2025-07-28 DIAGNOSIS — G89.29 CHRONIC LEFT SHOULDER PAIN: ICD-10-CM

## 2025-07-28 DIAGNOSIS — R29.898 WEAKNESS OF LEFT ARM: ICD-10-CM

## 2025-07-28 DIAGNOSIS — R68.89 IMPAIRED FUNCTION OF UPPER EXTREMITY: ICD-10-CM

## 2025-07-28 DIAGNOSIS — Z74.09 IMPAIRED FUNCTIONAL MOBILITY, BALANCE, GAIT, AND ENDURANCE: ICD-10-CM

## 2025-07-28 PROCEDURE — 97530 THERAPEUTIC ACTIVITIES: CPT | Performed by: PHYSICAL THERAPIST

## 2025-07-28 PROCEDURE — 97110 THERAPEUTIC EXERCISES: CPT | Performed by: PHYSICAL THERAPIST

## 2025-07-28 PROCEDURE — 97112 NEUROMUSCULAR REEDUCATION: CPT | Performed by: PHYSICAL THERAPIST

## 2025-07-31 ENCOUNTER — TREATMENT (OUTPATIENT)
Dept: PHYSICAL THERAPY | Facility: CLINIC | Age: 89
End: 2025-07-31
Payer: MEDICARE

## 2025-07-31 DIAGNOSIS — M79.18 MYOFASCIAL PAIN: ICD-10-CM

## 2025-07-31 DIAGNOSIS — R29.898 WEAKNESS OF LEFT ARM: ICD-10-CM

## 2025-07-31 DIAGNOSIS — G89.29 CHRONIC PAIN OF LEFT KNEE: Primary | ICD-10-CM

## 2025-07-31 DIAGNOSIS — R68.89 IMPAIRED FUNCTION OF UPPER EXTREMITY: ICD-10-CM

## 2025-07-31 DIAGNOSIS — M25.562 CHRONIC PAIN OF LEFT KNEE: Primary | ICD-10-CM

## 2025-07-31 DIAGNOSIS — M25.512 CHRONIC LEFT SHOULDER PAIN: ICD-10-CM

## 2025-07-31 DIAGNOSIS — Z74.09 IMPAIRED FUNCTIONAL MOBILITY, BALANCE, GAIT, AND ENDURANCE: ICD-10-CM

## 2025-07-31 DIAGNOSIS — G89.29 CHRONIC LEFT SHOULDER PAIN: ICD-10-CM

## 2025-07-31 NOTE — PROGRESS NOTES
Physical Therapy Daily Note    Patient Name: Gladys Izquierdo         :  1935  Referring Physician: Gray Thakur MD  Treatment Date: 2025  Date of Initial Visit: Type: THERAPY  Noted: 2025    Visit Diagnoses:    ICD-10-CM ICD-9-CM   1. Chronic pain of left knee  M25.562 719.46    G89.29 338.29   2. Impaired functional mobility, balance, gait, and endurance  Z74.09 V49.89   3. Chronic left shoulder pain  M25.512 719.41    G89.29 338.29   4. Weakness of left arm  R29.898 729.89   5. Impaired function of upper extremity  R68.89 V49.5   6. Myofascial pain  M79.18 729.1     Patient seen for 7 sessions  _____________________________________________________    Subjective   Gladys Izquierdo reports: did OK - woke up too early - unable to step up a std step with (L) leg -     Objective   Pt ambulated w/ SBQC -     TREATMENT:   MANUAL THERAPY:   [x]   MEDIAL MOBS Gd lll w/ end range oscillations and prolonged holds to reduce lateral posturing of patella - (L)  []     []        EXERCISES:   [x]   SAQ w/ ECC lowering x 20  [x]   GLUTE SETS (B) / R-L  [x]   Beginner bridge w/ glute facil  [x]   SL CLAMS (L) 15/5 sec ea  [x]  SIT / STAND (bed 23in from floor) hands on thighs as needed -   [x]   3 in step up (green step) (L) w/ ue support on (R) side x 15  []   Lat Step up  [x]   WOODROW HIP ABD 25# x20  [x]   WOODROW LEG PRESS  85# x 20  [x]   SIDE STEPPING 30 ft ea w/ cane and SBA  [x]   MONSTER WALK fwd/retro  30 ft w/ cane and SBA  []   SLS  []   []   []   []   []     [x]   NUSTEP; seat 5 L9 x 8 min   []   UPDATED HEP and REVIEWED w/ PATIENT     [x]   NMR: Verbal and tactile cues to facilitate core, posture,  quad / vmo, glute, hip, LE musculature statically and dynamically. - Balance / proprioception activities -  -      Functional / Therapeutic Activities:    TAPING / BRACING:   [x]   1) K-tape to Unload PF jt (L) knee  [x]   2) K-tape to prevent lateral tracking (L) knee   SEE EXERCISEs -       Assessment/Plan      Assessment/Plan   89 yo female: Chronic knee pain / OA - Severe PF jt arthritis -   Pt would benefit from ortho referral - and further intervention -   Taping helpful in reducing pain and allowing improved gait / function -     Progress strengthening /stabilization /functional activity       _________________________________________________    Manual Therapy:            05     mins  39400;  Therapeutic Exercise:    20    mins  05298;     Neuromuscular Darron:   05     mins  53840;    Therapeutic Activity:     15      mins  54811;     Ultrasound:                          mins  57419;  Iontophoresis:                     mins  99857;    Electrical Stimulation:         mins  94751 ( );  Mechanical Traction:          mins  44756  Dry Needling                       mins self-pay     Timed Treatment:   45    mins                  Total Treatment:     45    mins        Jayce Curry PT  Physical Therapist  Lic # [id number]

## 2025-08-05 ENCOUNTER — TREATMENT (OUTPATIENT)
Dept: PHYSICAL THERAPY | Facility: CLINIC | Age: 89
End: 2025-08-05
Payer: MEDICARE

## 2025-08-05 DIAGNOSIS — M25.512 CHRONIC LEFT SHOULDER PAIN: ICD-10-CM

## 2025-08-05 DIAGNOSIS — G89.29 CHRONIC PAIN OF LEFT KNEE: Primary | ICD-10-CM

## 2025-08-05 DIAGNOSIS — R29.898 WEAKNESS OF LEFT ARM: ICD-10-CM

## 2025-08-05 DIAGNOSIS — R68.89 IMPAIRED FUNCTION OF UPPER EXTREMITY: ICD-10-CM

## 2025-08-05 DIAGNOSIS — M25.562 CHRONIC PAIN OF LEFT KNEE: Primary | ICD-10-CM

## 2025-08-05 DIAGNOSIS — Z74.09 IMPAIRED FUNCTIONAL MOBILITY, BALANCE, GAIT, AND ENDURANCE: ICD-10-CM

## 2025-08-05 DIAGNOSIS — M79.18 MYOFASCIAL PAIN: ICD-10-CM

## 2025-08-05 DIAGNOSIS — G89.29 CHRONIC LEFT SHOULDER PAIN: ICD-10-CM

## 2025-08-05 PROCEDURE — 97110 THERAPEUTIC EXERCISES: CPT | Performed by: PHYSICAL THERAPIST

## 2025-08-05 PROCEDURE — 97530 THERAPEUTIC ACTIVITIES: CPT | Performed by: PHYSICAL THERAPIST

## 2025-08-05 PROCEDURE — 97112 NEUROMUSCULAR REEDUCATION: CPT | Performed by: PHYSICAL THERAPIST

## 2025-08-07 ENCOUNTER — TREATMENT (OUTPATIENT)
Dept: PHYSICAL THERAPY | Facility: CLINIC | Age: 89
End: 2025-08-07
Payer: MEDICARE

## 2025-08-07 DIAGNOSIS — Z74.09 IMPAIRED FUNCTIONAL MOBILITY, BALANCE, GAIT, AND ENDURANCE: ICD-10-CM

## 2025-08-07 DIAGNOSIS — M25.562 CHRONIC PAIN OF LEFT KNEE: Primary | ICD-10-CM

## 2025-08-07 DIAGNOSIS — G89.29 CHRONIC PAIN OF LEFT KNEE: Primary | ICD-10-CM

## 2025-08-12 ENCOUNTER — TREATMENT (OUTPATIENT)
Dept: PHYSICAL THERAPY | Facility: CLINIC | Age: 89
End: 2025-08-12
Payer: MEDICARE

## 2025-08-12 DIAGNOSIS — Z74.09 IMPAIRED FUNCTIONAL MOBILITY, BALANCE, GAIT, AND ENDURANCE: ICD-10-CM

## 2025-08-12 DIAGNOSIS — M25.562 CHRONIC PAIN OF LEFT KNEE: Primary | ICD-10-CM

## 2025-08-12 DIAGNOSIS — G89.29 CHRONIC PAIN OF LEFT KNEE: Primary | ICD-10-CM

## 2025-08-14 ENCOUNTER — TREATMENT (OUTPATIENT)
Dept: PHYSICAL THERAPY | Facility: CLINIC | Age: 89
End: 2025-08-14
Payer: MEDICARE

## 2025-08-14 DIAGNOSIS — M25.512 CHRONIC LEFT SHOULDER PAIN: ICD-10-CM

## 2025-08-14 DIAGNOSIS — R68.89 IMPAIRED FUNCTION OF UPPER EXTREMITY: ICD-10-CM

## 2025-08-14 DIAGNOSIS — G89.29 CHRONIC PAIN OF LEFT KNEE: Primary | ICD-10-CM

## 2025-08-14 DIAGNOSIS — R29.898 WEAKNESS OF LEFT ARM: ICD-10-CM

## 2025-08-14 DIAGNOSIS — Z74.09 IMPAIRED FUNCTIONAL MOBILITY, BALANCE, GAIT, AND ENDURANCE: ICD-10-CM

## 2025-08-14 DIAGNOSIS — M25.562 CHRONIC PAIN OF LEFT KNEE: Primary | ICD-10-CM

## 2025-08-14 DIAGNOSIS — G89.29 CHRONIC LEFT SHOULDER PAIN: ICD-10-CM

## 2025-08-14 PROCEDURE — 97530 THERAPEUTIC ACTIVITIES: CPT | Performed by: PHYSICAL THERAPIST

## 2025-08-14 PROCEDURE — 97140 MANUAL THERAPY 1/> REGIONS: CPT | Performed by: PHYSICAL THERAPIST

## 2025-08-14 PROCEDURE — 97110 THERAPEUTIC EXERCISES: CPT | Performed by: PHYSICAL THERAPIST

## 2025-08-18 ENCOUNTER — OFFICE VISIT (OUTPATIENT)
Dept: ORTHOPEDIC SURGERY | Facility: CLINIC | Age: 89
End: 2025-08-18
Payer: MEDICARE

## 2025-08-18 VITALS — TEMPERATURE: 97.8 F | BODY MASS INDEX: 26.43 KG/M2 | HEIGHT: 61 IN | WEIGHT: 140 LBS

## 2025-08-18 DIAGNOSIS — M17.12 PRIMARY LOCALIZED OSTEOARTHROSIS OF LEFT LOWER LEG: Primary | ICD-10-CM

## 2025-08-18 DIAGNOSIS — M19.019 GLENOHUMERAL ARTHRITIS: ICD-10-CM

## 2025-08-18 DIAGNOSIS — M17.10 PATELLOFEMORAL ARTHRITIS: ICD-10-CM

## 2025-08-18 DIAGNOSIS — R52 PAIN: ICD-10-CM

## 2025-08-18 PROCEDURE — 1159F MED LIST DOCD IN RCRD: CPT | Performed by: ORTHOPAEDIC SURGERY

## 2025-08-18 PROCEDURE — 1160F RVW MEDS BY RX/DR IN RCRD: CPT | Performed by: ORTHOPAEDIC SURGERY

## 2025-08-18 PROCEDURE — 20610 DRAIN/INJ JOINT/BURSA W/O US: CPT | Performed by: ORTHOPAEDIC SURGERY

## 2025-08-18 PROCEDURE — 73030 X-RAY EXAM OF SHOULDER: CPT | Performed by: ORTHOPAEDIC SURGERY

## 2025-08-18 PROCEDURE — 99204 OFFICE O/P NEW MOD 45 MIN: CPT | Performed by: ORTHOPAEDIC SURGERY

## 2025-08-18 PROCEDURE — 73562 X-RAY EXAM OF KNEE 3: CPT | Performed by: ORTHOPAEDIC SURGERY

## 2025-08-18 RX ORDER — LIDOCAINE HYDROCHLORIDE 10 MG/ML
2 INJECTION, SOLUTION EPIDURAL; INFILTRATION; INTRACAUDAL; PERINEURAL
Status: COMPLETED | OUTPATIENT
Start: 2025-08-18 | End: 2025-08-18

## 2025-08-18 RX ORDER — METHYLPREDNISOLONE ACETATE 80 MG/ML
1 INJECTION, SUSPENSION INTRA-ARTICULAR; INTRALESIONAL; INTRAMUSCULAR; SOFT TISSUE
Status: COMPLETED | OUTPATIENT
Start: 2025-08-18 | End: 2025-08-18

## 2025-08-18 RX ORDER — METHYLPREDNISOLONE ACETATE 80 MG/ML
80 INJECTION, SUSPENSION INTRA-ARTICULAR; INTRALESIONAL; INTRAMUSCULAR; SOFT TISSUE
Status: COMPLETED | OUTPATIENT
Start: 2025-08-18 | End: 2025-08-18

## 2025-08-18 RX ADMIN — LIDOCAINE HYDROCHLORIDE 2 ML: 10 INJECTION, SOLUTION EPIDURAL; INFILTRATION; INTRACAUDAL; PERINEURAL at 08:27

## 2025-08-18 RX ADMIN — METHYLPREDNISOLONE ACETATE 80 MG: 80 INJECTION, SUSPENSION INTRA-ARTICULAR; INTRALESIONAL; INTRAMUSCULAR; SOFT TISSUE at 08:28

## 2025-08-18 RX ADMIN — METHYLPREDNISOLONE ACETATE 1 ML: 80 INJECTION, SUSPENSION INTRA-ARTICULAR; INTRALESIONAL; INTRAMUSCULAR; SOFT TISSUE at 08:27

## 2025-08-18 RX ADMIN — LIDOCAINE HYDROCHLORIDE 2 ML: 10 INJECTION, SOLUTION EPIDURAL; INFILTRATION; INTRACAUDAL; PERINEURAL at 08:28

## 2025-08-19 ENCOUNTER — TREATMENT (OUTPATIENT)
Dept: PHYSICAL THERAPY | Facility: CLINIC | Age: 89
End: 2025-08-19
Payer: MEDICARE

## 2025-08-19 DIAGNOSIS — G89.29 CHRONIC PAIN OF LEFT KNEE: Primary | ICD-10-CM

## 2025-08-19 DIAGNOSIS — M25.512 CHRONIC LEFT SHOULDER PAIN: ICD-10-CM

## 2025-08-19 DIAGNOSIS — Z74.09 IMPAIRED FUNCTIONAL MOBILITY, BALANCE, GAIT, AND ENDURANCE: ICD-10-CM

## 2025-08-19 DIAGNOSIS — M25.562 CHRONIC PAIN OF LEFT KNEE: Primary | ICD-10-CM

## 2025-08-19 DIAGNOSIS — R29.898 WEAKNESS OF LEFT ARM: ICD-10-CM

## 2025-08-19 DIAGNOSIS — R68.89 IMPAIRED FUNCTION OF UPPER EXTREMITY: ICD-10-CM

## 2025-08-19 DIAGNOSIS — G89.29 CHRONIC LEFT SHOULDER PAIN: ICD-10-CM

## 2025-08-19 PROCEDURE — 97112 NEUROMUSCULAR REEDUCATION: CPT | Performed by: PHYSICAL THERAPIST

## 2025-08-19 PROCEDURE — 97110 THERAPEUTIC EXERCISES: CPT | Performed by: PHYSICAL THERAPIST

## 2025-08-19 PROCEDURE — 97530 THERAPEUTIC ACTIVITIES: CPT | Performed by: PHYSICAL THERAPIST

## 2025-08-21 ENCOUNTER — TREATMENT (OUTPATIENT)
Dept: PHYSICAL THERAPY | Facility: CLINIC | Age: 89
End: 2025-08-21
Payer: MEDICARE

## 2025-08-21 DIAGNOSIS — G89.29 CHRONIC PAIN OF LEFT KNEE: Primary | ICD-10-CM

## 2025-08-21 DIAGNOSIS — M25.562 CHRONIC PAIN OF LEFT KNEE: Primary | ICD-10-CM

## 2025-08-21 DIAGNOSIS — Z74.09 IMPAIRED FUNCTIONAL MOBILITY, BALANCE, GAIT, AND ENDURANCE: ICD-10-CM

## 2025-08-22 ENCOUNTER — PATIENT ROUNDING (BHMG ONLY) (OUTPATIENT)
Dept: ORTHOPEDIC SURGERY | Facility: CLINIC | Age: 89
End: 2025-08-22
Payer: MEDICARE

## 2025-08-26 ENCOUNTER — TREATMENT (OUTPATIENT)
Dept: PHYSICAL THERAPY | Facility: CLINIC | Age: 89
End: 2025-08-26
Payer: MEDICARE

## 2025-08-26 DIAGNOSIS — R68.89 IMPAIRED FUNCTION OF UPPER EXTREMITY: ICD-10-CM

## 2025-08-26 DIAGNOSIS — M25.562 CHRONIC PAIN OF LEFT KNEE: Primary | ICD-10-CM

## 2025-08-26 DIAGNOSIS — Z74.09 IMPAIRED FUNCTIONAL MOBILITY, BALANCE, GAIT, AND ENDURANCE: ICD-10-CM

## 2025-08-26 DIAGNOSIS — M25.512 CHRONIC LEFT SHOULDER PAIN: ICD-10-CM

## 2025-08-26 DIAGNOSIS — R29.898 WEAKNESS OF LEFT ARM: ICD-10-CM

## 2025-08-26 DIAGNOSIS — G89.29 CHRONIC PAIN OF LEFT KNEE: Primary | ICD-10-CM

## 2025-08-26 DIAGNOSIS — G89.29 CHRONIC LEFT SHOULDER PAIN: ICD-10-CM

## 2025-08-26 DIAGNOSIS — M79.18 MYOFASCIAL PAIN: ICD-10-CM

## 2025-08-28 ENCOUNTER — TREATMENT (OUTPATIENT)
Dept: PHYSICAL THERAPY | Facility: CLINIC | Age: 89
End: 2025-08-28
Payer: MEDICARE

## 2025-08-28 DIAGNOSIS — M25.562 CHRONIC PAIN OF LEFT KNEE: Primary | ICD-10-CM

## 2025-08-28 DIAGNOSIS — M79.18 MYOFASCIAL PAIN: ICD-10-CM

## 2025-08-28 DIAGNOSIS — R68.89 IMPAIRED FUNCTION OF UPPER EXTREMITY: ICD-10-CM

## 2025-08-28 DIAGNOSIS — Z74.09 IMPAIRED FUNCTIONAL MOBILITY, BALANCE, GAIT, AND ENDURANCE: ICD-10-CM

## 2025-08-28 DIAGNOSIS — M25.512 CHRONIC LEFT SHOULDER PAIN: ICD-10-CM

## 2025-08-28 DIAGNOSIS — G89.29 CHRONIC LEFT SHOULDER PAIN: ICD-10-CM

## 2025-08-28 DIAGNOSIS — G89.29 CHRONIC PAIN OF LEFT KNEE: Primary | ICD-10-CM

## 2025-08-28 DIAGNOSIS — R29.898 WEAKNESS OF LEFT ARM: ICD-10-CM
